# Patient Record
Sex: FEMALE | Race: WHITE | NOT HISPANIC OR LATINO | Employment: OTHER | URBAN - METROPOLITAN AREA
[De-identification: names, ages, dates, MRNs, and addresses within clinical notes are randomized per-mention and may not be internally consistent; named-entity substitution may affect disease eponyms.]

---

## 2023-06-06 LAB
ANION GAP IN SER/PLAS: 13 MMOL/L (ref 10–20)
APPEARANCE, URINE: ABNORMAL
BACTERIA, URINE: ABNORMAL /HPF
BILIRUBIN, URINE: NEGATIVE
BLOOD, URINE: NEGATIVE
CALCIUM (MG/DL) IN SER/PLAS: 9.8 MG/DL (ref 8.6–10.3)
CARBON DIOXIDE, TOTAL (MMOL/L) IN SER/PLAS: 25 MMOL/L (ref 21–32)
CHLORIDE (MMOL/L) IN SER/PLAS: 108 MMOL/L (ref 98–107)
COLOR, URINE: YELLOW
CREATININE (MG/DL) IN SER/PLAS: 1.7 MG/DL (ref 0.5–1.05)
GFR FEMALE: 30 ML/MIN/1.73M2
GLUCOSE (MG/DL) IN SER/PLAS: 151 MG/DL (ref 74–99)
GLUCOSE, URINE: NEGATIVE MG/DL
KETONES, URINE: NEGATIVE MG/DL
LEUKOCYTE ESTERASE, URINE: ABNORMAL
MUCUS, URINE: ABNORMAL /LPF
NITRITE, URINE: NEGATIVE
PH, URINE: 6 (ref 5–8)
POTASSIUM (MMOL/L) IN SER/PLAS: 4.1 MMOL/L (ref 3.5–5.3)
PROTEIN, URINE: NEGATIVE MG/DL
RBC, URINE: 3 /HPF (ref 0–5)
RENAL EPITHELIAL CELLS, URINE: <1 /HPF
SODIUM (MMOL/L) IN SER/PLAS: 142 MMOL/L (ref 136–145)
SPECIFIC GRAVITY, URINE: 1.01 (ref 1–1.03)
SQUAMOUS EPITHELIAL CELLS, URINE: 10 /HPF
UREA NITROGEN (MG/DL) IN SER/PLAS: 28 MG/DL (ref 6–23)
UROBILINOGEN, URINE: <2 MG/DL (ref 0–1.9)
WBC CLUMPS, URINE: ABNORMAL /HPF
WBC, URINE: 72 /HPF (ref 0–5)

## 2023-06-08 LAB — URINE CULTURE: ABNORMAL

## 2023-09-05 DIAGNOSIS — I10 HTN (HYPERTENSION), BENIGN: ICD-10-CM

## 2023-09-05 DIAGNOSIS — E03.9 ACQUIRED HYPOTHYROIDISM: Primary | ICD-10-CM

## 2023-09-05 DIAGNOSIS — E78.2 MIXED HYPERLIPIDEMIA: ICD-10-CM

## 2023-09-05 RX ORDER — LEVOTHYROXINE SODIUM 100 UG/1
100 TABLET ORAL DAILY
Qty: 90 TABLET | Refills: 3 | Status: SHIPPED | OUTPATIENT
Start: 2023-09-05 | End: 2024-09-04

## 2023-09-05 RX ORDER — CARVEDILOL 25 MG/1
1 TABLET ORAL 2 TIMES DAILY
COMMUNITY
Start: 2020-06-05 | End: 2023-09-05 | Stop reason: SDUPTHER

## 2023-09-05 RX ORDER — ROSUVASTATIN CALCIUM 20 MG/1
20 TABLET, COATED ORAL DAILY
Qty: 90 TABLET | Refills: 3 | Status: SHIPPED | OUTPATIENT
Start: 2023-09-05 | End: 2024-09-04

## 2023-09-05 RX ORDER — LEVOTHYROXINE SODIUM 100 UG/1
1 TABLET ORAL DAILY
COMMUNITY
End: 2023-09-05 | Stop reason: SDUPTHER

## 2023-09-05 RX ORDER — NIFEDIPINE 90 MG/1
90 TABLET, EXTENDED RELEASE ORAL DAILY
Qty: 90 TABLET | Refills: 3 | Status: SHIPPED | OUTPATIENT
Start: 2023-09-05 | End: 2024-09-04

## 2023-09-05 RX ORDER — SPIRONOLACTONE 25 MG/1
1 TABLET ORAL DAILY
COMMUNITY
Start: 2018-11-27 | End: 2024-03-15

## 2023-09-05 RX ORDER — ROSUVASTATIN CALCIUM 20 MG/1
1 TABLET, COATED ORAL DAILY
COMMUNITY
Start: 2009-07-01 | End: 2023-09-05 | Stop reason: SDUPTHER

## 2023-09-05 RX ORDER — CARVEDILOL 25 MG/1
25 TABLET ORAL 2 TIMES DAILY
Qty: 180 TABLET | Refills: 3 | Status: SHIPPED | OUTPATIENT
Start: 2023-09-05 | End: 2024-09-04

## 2023-09-05 RX ORDER — NIFEDIPINE 90 MG/1
1 TABLET, EXTENDED RELEASE ORAL DAILY
COMMUNITY
Start: 2018-08-14 | End: 2023-09-05 | Stop reason: SDUPTHER

## 2023-09-20 LAB
ANION GAP IN SER/PLAS: 12 MMOL/L (ref 10–20)
CALCIUM (MG/DL) IN SER/PLAS: 9.7 MG/DL (ref 8.6–10.3)
CARBON DIOXIDE, TOTAL (MMOL/L) IN SER/PLAS: 24 MMOL/L (ref 21–32)
CHLORIDE (MMOL/L) IN SER/PLAS: 109 MMOL/L (ref 98–107)
CREATININE (MG/DL) IN SER/PLAS: 1.51 MG/DL (ref 0.5–1.05)
GFR FEMALE: 35 ML/MIN/1.73M2
GLUCOSE (MG/DL) IN SER/PLAS: 142 MG/DL (ref 74–99)
MAGNESIUM (MG/DL) IN SER/PLAS: 2.08 MG/DL (ref 1.6–2.4)
PHOSPHATE (MG/DL) IN SER/PLAS: 3.2 MG/DL (ref 2.5–4.9)
POTASSIUM (MMOL/L) IN SER/PLAS: 4.3 MMOL/L (ref 3.5–5.3)
SODIUM (MMOL/L) IN SER/PLAS: 141 MMOL/L (ref 136–145)
URATE (MG/DL) IN SER/PLAS: 5.5 MG/DL (ref 2.3–6.7)
UREA NITROGEN (MG/DL) IN SER/PLAS: 21 MG/DL (ref 6–23)

## 2023-09-22 LAB — URINE CULTURE: ABNORMAL

## 2023-11-30 DIAGNOSIS — N18.4 CKD (CHRONIC KIDNEY DISEASE) STAGE 4, GFR 15-29 ML/MIN (MULTI): ICD-10-CM

## 2023-12-04 ENCOUNTER — LAB (OUTPATIENT)
Dept: LAB | Facility: LAB | Age: 78
End: 2023-12-04
Payer: MEDICARE

## 2023-12-04 DIAGNOSIS — N18.4 CKD (CHRONIC KIDNEY DISEASE) STAGE 4, GFR 15-29 ML/MIN (MULTI): ICD-10-CM

## 2023-12-04 LAB
ANION GAP SERPL CALC-SCNC: 11 MMOL/L (ref 10–20)
BUN SERPL-MCNC: 19 MG/DL (ref 6–23)
CALCIUM SERPL-MCNC: 9.6 MG/DL (ref 8.6–10.3)
CHLORIDE SERPL-SCNC: 107 MMOL/L (ref 98–107)
CO2 SERPL-SCNC: 27 MMOL/L (ref 21–32)
CREAT SERPL-MCNC: 1.58 MG/DL (ref 0.5–1.05)
GFR SERPL CREATININE-BSD FRML MDRD: 33 ML/MIN/1.73M*2
GLUCOSE SERPL-MCNC: 126 MG/DL (ref 74–99)
MAGNESIUM SERPL-MCNC: 2.11 MG/DL (ref 1.6–2.4)
PHOSPHATE SERPL-MCNC: 2.3 MG/DL (ref 2.5–4.9)
POTASSIUM SERPL-SCNC: 4.2 MMOL/L (ref 3.5–5.3)
SODIUM SERPL-SCNC: 141 MMOL/L (ref 136–145)
URATE SERPL-MCNC: 5.7 MG/DL (ref 2.3–6.7)

## 2023-12-04 PROCEDURE — 80048 BASIC METABOLIC PNL TOTAL CA: CPT

## 2023-12-04 PROCEDURE — 36415 COLL VENOUS BLD VENIPUNCTURE: CPT

## 2023-12-04 PROCEDURE — 83735 ASSAY OF MAGNESIUM: CPT

## 2023-12-04 PROCEDURE — 84550 ASSAY OF BLOOD/URIC ACID: CPT

## 2023-12-04 PROCEDURE — 84100 ASSAY OF PHOSPHORUS: CPT

## 2023-12-07 ENCOUNTER — OFFICE VISIT (OUTPATIENT)
Dept: NEPHROLOGY | Facility: CLINIC | Age: 78
End: 2023-12-07
Payer: MEDICARE

## 2023-12-07 VITALS
DIASTOLIC BLOOD PRESSURE: 58 MMHG | WEIGHT: 202.4 LBS | SYSTOLIC BLOOD PRESSURE: 120 MMHG | BODY MASS INDEX: 35.86 KG/M2 | HEART RATE: 68 BPM | HEIGHT: 63 IN

## 2023-12-07 DIAGNOSIS — N95.2 VAGINAL ATROPHY: Primary | ICD-10-CM

## 2023-12-07 DIAGNOSIS — E78.2 MIXED HYPERLIPIDEMIA: ICD-10-CM

## 2023-12-07 DIAGNOSIS — E55.9 VITAMIN D INSUFFICIENCY: ICD-10-CM

## 2023-12-07 DIAGNOSIS — N18.32 STAGE 3B CHRONIC KIDNEY DISEASE (MULTI): ICD-10-CM

## 2023-12-07 DIAGNOSIS — I10 PRIMARY HYPERTENSION: ICD-10-CM

## 2023-12-07 DIAGNOSIS — N18.4 CKD (CHRONIC KIDNEY DISEASE) STAGE 4, GFR 15-29 ML/MIN (MULTI): Primary | ICD-10-CM

## 2023-12-07 DIAGNOSIS — E87.20 METABOLIC ACIDOSIS: ICD-10-CM

## 2023-12-07 PROBLEM — G47.33 OSA ON CPAP: Status: ACTIVE | Noted: 2023-12-07

## 2023-12-07 PROBLEM — E78.00 HYPERCHOLESTEREMIA: Status: ACTIVE | Noted: 2023-12-07

## 2023-12-07 PROBLEM — N18.30 CHRONIC KIDNEY DISEASE (CKD), STAGE III (MODERATE) (MULTI): Status: ACTIVE | Noted: 2023-12-07

## 2023-12-07 PROCEDURE — 1160F RVW MEDS BY RX/DR IN RCRD: CPT | Performed by: CLINICAL NURSE SPECIALIST

## 2023-12-07 PROCEDURE — 1036F TOBACCO NON-USER: CPT | Performed by: CLINICAL NURSE SPECIALIST

## 2023-12-07 PROCEDURE — 3078F DIAST BP <80 MM HG: CPT | Performed by: CLINICAL NURSE SPECIALIST

## 2023-12-07 PROCEDURE — 1159F MED LIST DOCD IN RCRD: CPT | Performed by: CLINICAL NURSE SPECIALIST

## 2023-12-07 PROCEDURE — 3074F SYST BP LT 130 MM HG: CPT | Performed by: CLINICAL NURSE SPECIALIST

## 2023-12-07 PROCEDURE — 99213 OFFICE O/P EST LOW 20 MIN: CPT | Performed by: CLINICAL NURSE SPECIALIST

## 2023-12-07 RX ORDER — ESTRADIOL 0.1 MG/G
CREAM VAGINAL DAILY
COMMUNITY
End: 2023-12-07 | Stop reason: SDUPTHER

## 2023-12-07 RX ORDER — ESTRADIOL 0.1 MG/G
CREAM VAGINAL
Qty: 42.5 G | Refills: 2 | Status: SHIPPED | OUTPATIENT
Start: 2023-12-07

## 2023-12-07 RX ORDER — CILOSTAZOL 50 MG/1
50 TABLET ORAL 2 TIMES DAILY
COMMUNITY
End: 2024-02-05 | Stop reason: SDUPTHER

## 2023-12-07 RX ORDER — ASPIRIN 81 MG/1
81 TABLET ORAL DAILY
COMMUNITY

## 2023-12-07 ASSESSMENT — ENCOUNTER SYMPTOMS
CARDIOVASCULAR NEGATIVE: 1
MUSCULOSKELETAL NEGATIVE: 1
NEUROLOGICAL NEGATIVE: 1
GASTROINTESTINAL NEGATIVE: 1
PSYCHIATRIC NEGATIVE: 1
RESPIRATORY NEGATIVE: 1
ENDOCRINE NEGATIVE: 1
CONSTITUTIONAL NEGATIVE: 1

## 2023-12-07 NOTE — ASSESSMENT & PLAN NOTE
Blood pressures currently well-controlled on carvedilol, nifedipine, spironolactone, will continue with current meds

## 2023-12-07 NOTE — ASSESSMENT & PLAN NOTE
Currently on sodium bicarb tablets twice a day, bicarb level at 27 we will continue with current dosage

## 2023-12-07 NOTE — ASSESSMENT & PLAN NOTE
"Lab Results   Component Value Date    CHOL 159 12/12/2020     Lab Results   Component Value Date    HDL 45.0 12/12/2020     No results found for: \"LDLCALC\"  Lab Results   Component Value Date    TRIG 261 (H) 12/12/2020     No components found for: \"CHOLHDL\"  Currently on Crestor 20 mg, will recheck labs at her next visit  "

## 2023-12-07 NOTE — PROGRESS NOTES
Subjective   Patient ID: Brit Lopez is a 78 y.o. female who presents for Follow-up (6 month ck /Review labs).  Patient being seen in follow-up for chronic kidney disease stage IV with history of resistant hypertension    Labs reviewed  Uric acid 5.7  Phosphorus 2.3  Magnesium 2.11  Glucose 126  Electrolytes with sodium 141, potassium 4.2, chloride 107, bicarb 27, calcium 9.6  Renal functions BUN of 19 and creatinine of 1.58, GFR is 33    She is doing well  She does not have any complaints of urinary tract infections  She is complaining of some hip pain after she turned and bedbound  She does not have any edema  Her blood pressure is well-controlled  She is tolerating her sodium bicarb tablets without difficulties        Review of Systems   Constitutional: Negative.    Respiratory: Negative.     Cardiovascular: Negative.    Gastrointestinal: Negative.    Endocrine: Negative.    Genitourinary: Negative.    Musculoskeletal: Negative.    Skin: Negative.    Neurological: Negative.    Psychiatric/Behavioral: Negative.         Objective   Physical Exam  Vitals reviewed.   Constitutional:       Appearance: Normal appearance.   HENT:      Head: Normocephalic.   Cardiovascular:      Rate and Rhythm: Normal rate and regular rhythm.   Pulmonary:      Effort: Pulmonary effort is normal.      Breath sounds: Normal breath sounds.   Abdominal:      Palpations: Abdomen is soft.   Musculoskeletal:         General: Normal range of motion.   Skin:     General: Skin is warm and dry.   Neurological:      Mental Status: She is alert and oriented to person, place, and time.   Psychiatric:         Mood and Affect: Mood normal.         Behavior: Behavior normal.         Assessment/Plan       - Chronic kidney disease Stage IIIb/IV: With baseline creatinine 1.5-1.8  - Resistant hypertension: On Nifediine, Spironolactone, Coreg   - Normocytic anemia   - Obstructive sleep apnea with use of CPAP  - Coronary artery disease with 3 stents  placed in past  - Hypothyroidism TSH last of 0.13  - Hyperlipidemia  - Peripheral arterial disease  - Obesity  - Vaginal prolapse: Vaginal Hysterectomy, S/P repair 10/17/18  - Vitamin D Deficiency  - R Renal Atrophy with GFR of 18% and Nephrectomy on 2/1/2019  - Nephrolithiasis S/P ESWL  - Multiple UTI's in the past   - NAGMA : Better.   - Intermittent Diarrhea.        CELIA Kim-DANIEL, DNP 12/07/23 10:51 AM

## 2024-01-31 ENCOUNTER — APPOINTMENT (OUTPATIENT)
Dept: URBAN - METROPOLITAN AREA CLINIC 204 | Age: 79
Setting detail: DERMATOLOGY
End: 2024-02-01

## 2024-01-31 PROCEDURE — 17003 DESTRUCT PREMALG LES 2-14: CPT

## 2024-01-31 PROCEDURE — OTHER MIPS QUALITY: OTHER

## 2024-01-31 PROCEDURE — 17000 DESTRUCT PREMALG LESION: CPT

## 2024-01-31 PROCEDURE — 99213 OFFICE O/P EST LOW 20 MIN: CPT | Mod: 25

## 2024-01-31 NOTE — PROCEDURE: MIPS QUALITY
Quality 110: Preventive Care And Screening: Influenza Immunization: Influenza Immunization not Administered because Patient Refused.
Quality 47: Advance Care Plan: Advance Care Planning discussed and documented; advance care plan or surrogate decision maker documented in the medical record.
Quality 226: Preventive Care And Screening: Tobacco Use: Screening And Cessation Intervention: Patient screened for tobacco use and is an ex/non-smoker
Quality 111:Pneumonia Vaccination Status For Older Adults: Patient did not receive any pneumococcal conjugate or polysaccharide vaccine on or after their 60th birthday and before the end of the measurement period
Quality 130: Documentation Of Current Medications In The Medical Record: Current Medications Documented
Detail Level: Detailed
Additional Notes: Documentation for MIPS  purposes only. Full Patient visit note from paper chart is available for review and also scanned in EMA chart.

## 2024-02-01 ENCOUNTER — TELEPHONE (OUTPATIENT)
Dept: PRIMARY CARE | Facility: CLINIC | Age: 79
End: 2024-02-01
Payer: MEDICARE

## 2024-02-01 DIAGNOSIS — I21.9 ACUTE MYOCARDIAL INFARCTION, UNSPECIFIED MI TYPE, UNSPECIFIED ARTERY (MULTI): ICD-10-CM

## 2024-02-01 NOTE — TELEPHONE ENCOUNTER
Pt called for refill on cilostazol. Hasn't been seen since 2022. Patient needs an appointment for refills. Attempted to call but no answer and no voicemail. Please schedule for appointment when she calls.

## 2024-02-05 RX ORDER — CILOSTAZOL 50 MG/1
50 TABLET ORAL 2 TIMES DAILY
Qty: 180 TABLET | Refills: 0 | Status: SHIPPED | OUTPATIENT
Start: 2024-02-05 | End: 2024-02-26 | Stop reason: SDUPTHER

## 2024-02-26 ENCOUNTER — OFFICE VISIT (OUTPATIENT)
Dept: PRIMARY CARE | Facility: CLINIC | Age: 79
End: 2024-02-26
Payer: MEDICARE

## 2024-02-26 ENCOUNTER — LAB (OUTPATIENT)
Dept: LAB | Facility: LAB | Age: 79
End: 2024-02-26
Payer: MEDICARE

## 2024-02-26 VITALS
BODY MASS INDEX: 35.58 KG/M2 | DIASTOLIC BLOOD PRESSURE: 60 MMHG | WEIGHT: 200.8 LBS | HEART RATE: 66 BPM | SYSTOLIC BLOOD PRESSURE: 130 MMHG | HEIGHT: 63 IN | OXYGEN SATURATION: 96 %

## 2024-02-26 DIAGNOSIS — I73.9 PVD (PERIPHERAL VASCULAR DISEASE) (CMS-HCC): ICD-10-CM

## 2024-02-26 DIAGNOSIS — I21.9 ACUTE MYOCARDIAL INFARCTION, UNSPECIFIED MI TYPE, UNSPECIFIED ARTERY (MULTI): ICD-10-CM

## 2024-02-26 DIAGNOSIS — E66.01 OBESITY, MORBID (MULTI): ICD-10-CM

## 2024-02-26 DIAGNOSIS — I10 PRIMARY HYPERTENSION: ICD-10-CM

## 2024-02-26 DIAGNOSIS — R73.02 IGT (IMPAIRED GLUCOSE TOLERANCE): ICD-10-CM

## 2024-02-26 DIAGNOSIS — N18.32 STAGE 3B CHRONIC KIDNEY DISEASE (MULTI): ICD-10-CM

## 2024-02-26 DIAGNOSIS — E03.9 ACQUIRED HYPOTHYROIDISM: ICD-10-CM

## 2024-02-26 DIAGNOSIS — Z00.00 ENCOUNTER FOR MEDICARE ANNUAL WELLNESS EXAM: ICD-10-CM

## 2024-02-26 DIAGNOSIS — R06.09 DOE (DYSPNEA ON EXERTION): ICD-10-CM

## 2024-02-26 DIAGNOSIS — E78.2 MIXED HYPERLIPIDEMIA: Primary | ICD-10-CM

## 2024-02-26 PROBLEM — E78.00 HYPERCHOLESTEREMIA: Status: RESOLVED | Noted: 2023-12-07 | Resolved: 2024-02-26

## 2024-02-26 PROBLEM — N18.4 STAGE 4 CHRONIC KIDNEY DISEASE (MULTI): Status: RESOLVED | Noted: 2023-12-07 | Resolved: 2024-02-26

## 2024-02-26 LAB
EST. AVERAGE GLUCOSE BLD GHB EST-MCNC: 128 MG/DL
HBA1C MFR BLD: 6.1 %
TSH SERPL-ACNC: 0.39 MIU/L (ref 0.44–3.98)

## 2024-02-26 PROCEDURE — 36415 COLL VENOUS BLD VENIPUNCTURE: CPT

## 2024-02-26 PROCEDURE — 1159F MED LIST DOCD IN RCRD: CPT | Performed by: FAMILY MEDICINE

## 2024-02-26 PROCEDURE — 3078F DIAST BP <80 MM HG: CPT | Performed by: FAMILY MEDICINE

## 2024-02-26 PROCEDURE — G0439 PPPS, SUBSEQ VISIT: HCPCS | Performed by: FAMILY MEDICINE

## 2024-02-26 PROCEDURE — 1160F RVW MEDS BY RX/DR IN RCRD: CPT | Performed by: FAMILY MEDICINE

## 2024-02-26 PROCEDURE — 83036 HEMOGLOBIN GLYCOSYLATED A1C: CPT

## 2024-02-26 PROCEDURE — 1036F TOBACCO NON-USER: CPT | Performed by: FAMILY MEDICINE

## 2024-02-26 PROCEDURE — 3075F SYST BP GE 130 - 139MM HG: CPT | Performed by: FAMILY MEDICINE

## 2024-02-26 PROCEDURE — 1170F FXNL STATUS ASSESSED: CPT | Performed by: FAMILY MEDICINE

## 2024-02-26 PROCEDURE — 84443 ASSAY THYROID STIM HORMONE: CPT

## 2024-02-26 PROCEDURE — 99214 OFFICE O/P EST MOD 30 MIN: CPT | Performed by: FAMILY MEDICINE

## 2024-02-26 RX ORDER — ALPHA LIPOIC ACID 300 MG
CAPSULE ORAL
COMMUNITY

## 2024-02-26 RX ORDER — CILOSTAZOL 50 MG/1
50 TABLET ORAL 2 TIMES DAILY
Qty: 180 TABLET | Refills: 0 | Status: SHIPPED | OUTPATIENT
Start: 2024-02-26 | End: 2024-05-26

## 2024-02-26 RX ORDER — MULTIVITAMIN/IRON/FOLIC ACID 18MG-0.4MG
1 TABLET ORAL DAILY
COMMUNITY

## 2024-02-26 ASSESSMENT — PATIENT HEALTH QUESTIONNAIRE - PHQ9
2. FEELING DOWN, DEPRESSED OR HOPELESS: NOT AT ALL
1. LITTLE INTEREST OR PLEASURE IN DOING THINGS: NOT AT ALL
SUM OF ALL RESPONSES TO PHQ9 QUESTIONS 1 AND 2: 0

## 2024-02-26 ASSESSMENT — ACTIVITIES OF DAILY LIVING (ADL)
MANAGING_FINANCES: INDEPENDENT
DOING_HOUSEWORK: INDEPENDENT
GROCERY_SHOPPING: INDEPENDENT
TAKING_MEDICATION: INDEPENDENT
BATHING: INDEPENDENT
DRESSING: INDEPENDENT

## 2024-02-26 ASSESSMENT — ENCOUNTER SYMPTOMS
OCCASIONAL FEELINGS OF UNSTEADINESS: 0
DEPRESSION: 0
LOSS OF SENSATION IN FEET: 0

## 2024-02-26 NOTE — PROGRESS NOTES
"Subjective   Patient ID: Brit Lopez is a 78 y.o. female who presents for Medicare Annual Wellness Visit Subsequent.    HPI   Since the last office visit there have been no , hospitalizations, important illnesses or injuries.  Had bilt cataracts in last yr.  Had fall on stool incloset  Sees dyan Apodaca for brcancer, Cathie for otalgia  Has neuroathy since hip replacement  HTN-Takes and tolerates meds without side effects. No alcohol. no tobacco. no exercise. low salt.  Reviewed recommendation for 150 minutes of exercise per week including 2 days of weight training if over age 50  Hyperlipidemia- is on a statin and a prudent diet.  Hypothyroid- is euthyroid on replacement. Thyroid ros is unremarkable.  IGT- will ck  Taking otc for loose stools.  CAD- no angina, gets worn out faster over the last year.  Will check for significant coronary artery disease with stress test  PVD- claudication improved on pletal. Can walk grocery store.  She is compliant with KASSI treatment and receives benefit  Review of Systems  General-no fatigue weight to within 10 pounds  ENT no problems with vision swallowing  Cardiac no chest pains palpitations change in exercise tolerance or capacity  Pulmonary no cough shortness of breath  GI no heartburn or abdominal pain  Musculoskeletal no joint pains  Objective   /60   Pulse 66   Ht 1.6 m (5' 3\")   Wt 91.1 kg (200 lb 12.8 oz)   SpO2 96%   BMI 35.57 kg/m²     Physical Exam  General:  Alert, No acute distress. Appears stated age  Eye:  Pupils are equal, round and reactive to light, Extraocular movements are intact, Normal conjunctiva.    Neck:  Supple, Non-tender, No carotid bruit, No jugular venous distention, No lymphadenopathy, No thyromegaly.    Respiratory:  Lungs are clear to auscultation, Respirations are non-labored, Breath sounds are equal.    Cardiovascular:  Normal rate, Regular rhythm, No murmur.    Gastrointestinal:  Soft, Non-tender, No organomegaly. No " solid or pulsatile mass  Integumentary:  Warm, Dry. No concerning lesions on exposed areas  Neurologic:  Alert, Oriented.  Gross and fine motor intact, CN 2-12 intact  Psychiatric:  Cooperative, Appropriate mood & affect.  Assessment/Plan   Problem List Items Addressed This Visit             ICD-10-CM    Mixed hyperlipidemia - Primary E78.2    Relevant Orders    Follow Up In Primary Care - Established    Hypertension I10    Relevant Orders    Follow Up In Primary Care - Established    Chronic kidney disease (CKD), stage III (moderate) (CMS/[facility]) N18.30    Relevant Orders    Follow Up In Primary Care - Established    Acute myocardial infarction (CMS/[facility]) I21.9    Relevant Medications    cilostazol (Pletal) 50 mg tablet    Other Relevant Orders    Follow Up In Primary Care - Established    PVD (peripheral vascular disease) (CMS/[facility]) I73.9    Relevant Medications    cilostazol (Pletal) 50 mg tablet    Other Relevant Orders    Follow Up In Primary Care - Established     Other Visit Diagnoses         Codes    BINGHAM (dyspnea on exertion)     R06.09    Relevant Orders    Nuclear Stress Test    Follow Up In Primary Care - Established    Encounter for Medicare annual wellness exam     Z00.00    Relevant Orders    Follow Up In Primary Care - Established    Acquired hypothyroidism     E03.9    Relevant Orders    Thyroid Stimulating Hormone (Completed)    Follow Up In Primary Care - Established    IGT (impaired glucose tolerance)     R73.02    Relevant Orders    Hemoglobin A1C (Completed)    Follow Up In Primary Care - Established             Patient was identified as a fall risk. Risk prevention instructions provided.

## 2024-02-26 NOTE — PATIENT INSTRUCTIONS

## 2024-02-27 ENCOUNTER — TELEPHONE (OUTPATIENT)
Dept: PRIMARY CARE | Facility: CLINIC | Age: 79
End: 2024-02-27
Payer: MEDICARE

## 2024-02-27 NOTE — TELEPHONE ENCOUNTER
----- Message from Álvaro Sterling MD sent at 2/26/2024  4:40 PM EST -----  Please let patient know both the A1c and thyroid are acceptable

## 2024-03-14 ENCOUNTER — HOSPITAL ENCOUNTER (OUTPATIENT)
Dept: RADIOLOGY | Facility: HOSPITAL | Age: 79
Discharge: HOME | End: 2024-03-14
Payer: MEDICARE

## 2024-03-14 ENCOUNTER — HOSPITAL ENCOUNTER (OUTPATIENT)
Dept: CARDIOLOGY | Facility: HOSPITAL | Age: 79
Discharge: HOME | End: 2024-03-14
Payer: MEDICARE

## 2024-03-14 VITALS — SYSTOLIC BLOOD PRESSURE: 125 MMHG | DIASTOLIC BLOOD PRESSURE: 67 MMHG | HEART RATE: 73 BPM

## 2024-03-14 DIAGNOSIS — I10 PRIMARY HYPERTENSION: Primary | ICD-10-CM

## 2024-03-14 DIAGNOSIS — R06.09 DOE (DYSPNEA ON EXERTION): ICD-10-CM

## 2024-03-14 PROCEDURE — A9502 TC99M TETROFOSMIN: HCPCS | Performed by: FAMILY MEDICINE

## 2024-03-14 PROCEDURE — 93017 CV STRESS TEST TRACING ONLY: CPT

## 2024-03-14 PROCEDURE — 78452 HT MUSCLE IMAGE SPECT MULT: CPT | Performed by: NUCLEAR MEDICINE

## 2024-03-14 PROCEDURE — 3430000001 HC RX 343 DIAGNOSTIC RADIOPHARMACEUTICALS: Performed by: FAMILY MEDICINE

## 2024-03-14 PROCEDURE — 2500000004 HC RX 250 GENERAL PHARMACY W/ HCPCS (ALT 636 FOR OP/ED): Performed by: FAMILY MEDICINE

## 2024-03-14 PROCEDURE — 78452 HT MUSCLE IMAGE SPECT MULT: CPT

## 2024-03-14 RX ORDER — REGADENOSON 0.08 MG/ML
0.4 INJECTION, SOLUTION INTRAVENOUS ONCE
Status: COMPLETED | OUTPATIENT
Start: 2024-03-14 | End: 2024-03-14

## 2024-03-14 RX ADMIN — TETROFOSMIN 34 MILLICURIE: 0.23 INJECTION, POWDER, LYOPHILIZED, FOR SOLUTION INTRAVENOUS at 08:35

## 2024-03-14 RX ADMIN — TETROFOSMIN 10.8 MILLICURIE: 0.23 INJECTION, POWDER, LYOPHILIZED, FOR SOLUTION INTRAVENOUS at 07:00

## 2024-03-14 RX ADMIN — REGADENOSON 0.4 MG: 0.08 INJECTION, SOLUTION INTRAVENOUS at 08:33

## 2024-03-15 ENCOUNTER — TELEPHONE (OUTPATIENT)
Dept: PRIMARY CARE | Facility: CLINIC | Age: 79
End: 2024-03-15
Payer: MEDICARE

## 2024-03-15 RX ORDER — SPIRONOLACTONE 25 MG/1
25 TABLET ORAL DAILY
Qty: 90 TABLET | Refills: 3 | Status: SHIPPED | OUTPATIENT
Start: 2024-03-15

## 2024-03-15 NOTE — TELEPHONE ENCOUNTER
----- Message from Álvaro Sterling MD sent at 3/14/2024  6:20 PM EDT -----  Let patient know stress test was negative

## 2024-04-23 ENCOUNTER — OFFICE VISIT (OUTPATIENT)
Dept: OBSTETRICS AND GYNECOLOGY | Facility: CLINIC | Age: 79
End: 2024-04-23
Payer: MEDICARE

## 2024-04-23 VITALS
BODY MASS INDEX: 35.57 KG/M2 | RESPIRATION RATE: 16 BRPM | SYSTOLIC BLOOD PRESSURE: 126 MMHG | HEART RATE: 71 BPM | TEMPERATURE: 97.3 F | DIASTOLIC BLOOD PRESSURE: 69 MMHG | OXYGEN SATURATION: 95 % | WEIGHT: 200.8 LBS

## 2024-04-23 DIAGNOSIS — N39.0 RECURRENT UTI: ICD-10-CM

## 2024-04-23 DIAGNOSIS — R15.9 FULL INCONTINENCE OF FECES: Primary | ICD-10-CM

## 2024-04-23 LAB
APPEARANCE UR: ABNORMAL
BACTERIA #/AREA URNS AUTO: ABNORMAL /HPF
BILIRUB UR STRIP.AUTO-MCNC: NEGATIVE MG/DL
COLOR UR: ABNORMAL
GLUCOSE UR STRIP.AUTO-MCNC: NEGATIVE MG/DL
HOLD SPECIMEN: NORMAL
KETONES UR STRIP.AUTO-MCNC: NEGATIVE MG/DL
LEUKOCYTE ESTERASE UR QL STRIP.AUTO: ABNORMAL
MUCOUS THREADS #/AREA URNS AUTO: ABNORMAL /LPF
NITRITE UR QL STRIP.AUTO: NEGATIVE
PH UR STRIP.AUTO: 5 [PH]
PROT UR STRIP.AUTO-MCNC: NEGATIVE MG/DL
RBC # UR STRIP.AUTO: NEGATIVE /UL
RBC #/AREA URNS AUTO: ABNORMAL /HPF
SP GR UR STRIP.AUTO: 1.02
SQUAMOUS #/AREA URNS AUTO: ABNORMAL /HPF
UROBILINOGEN UR STRIP.AUTO-MCNC: <2 MG/DL
WBC #/AREA URNS AUTO: >50 /HPF
WBC CLUMPS #/AREA URNS AUTO: ABNORMAL /HPF

## 2024-04-23 PROCEDURE — 99214 OFFICE O/P EST MOD 30 MIN: CPT | Performed by: STUDENT IN AN ORGANIZED HEALTH CARE EDUCATION/TRAINING PROGRAM

## 2024-04-23 PROCEDURE — 1159F MED LIST DOCD IN RCRD: CPT | Performed by: STUDENT IN AN ORGANIZED HEALTH CARE EDUCATION/TRAINING PROGRAM

## 2024-04-23 PROCEDURE — 1160F RVW MEDS BY RX/DR IN RCRD: CPT | Performed by: STUDENT IN AN ORGANIZED HEALTH CARE EDUCATION/TRAINING PROGRAM

## 2024-04-23 PROCEDURE — 3074F SYST BP LT 130 MM HG: CPT | Performed by: STUDENT IN AN ORGANIZED HEALTH CARE EDUCATION/TRAINING PROGRAM

## 2024-04-23 PROCEDURE — 1036F TOBACCO NON-USER: CPT | Performed by: STUDENT IN AN ORGANIZED HEALTH CARE EDUCATION/TRAINING PROGRAM

## 2024-04-23 PROCEDURE — 1126F AMNT PAIN NOTED NONE PRSNT: CPT | Performed by: STUDENT IN AN ORGANIZED HEALTH CARE EDUCATION/TRAINING PROGRAM

## 2024-04-23 PROCEDURE — 81001 URINALYSIS AUTO W/SCOPE: CPT | Performed by: STUDENT IN AN ORGANIZED HEALTH CARE EDUCATION/TRAINING PROGRAM

## 2024-04-23 PROCEDURE — 87086 URINE CULTURE/COLONY COUNT: CPT | Mod: SAMLAB | Performed by: STUDENT IN AN ORGANIZED HEALTH CARE EDUCATION/TRAINING PROGRAM

## 2024-04-23 PROCEDURE — 3078F DIAST BP <80 MM HG: CPT | Performed by: STUDENT IN AN ORGANIZED HEALTH CARE EDUCATION/TRAINING PROGRAM

## 2024-04-23 ASSESSMENT — PAIN SCALES - GENERAL: PAINLEVEL: 0-NO PAIN

## 2024-04-23 NOTE — PROGRESS NOTES
HISTORY OF PRESENT ILLNESS:  Brit Lopez is a 78 y.o. female, who presents in follow up for FI, Clark.     During last encounter on 4/26/23, reviewed and agreed to the following:  Diagnoses:   #1 Asymptomatic bacteruria  #2 Fecal incontinence with loose stools     Plan:   1. Asymptomatic bacteruria, vaginal atrophy  - No recent hx of UTI.   - Continue using tv estrogen cream 2x per week for vaginal atrophy and UTI ppx.      2. Fecal incontinence, diarrhea  - Significantly improved FI since her last visit since she started eating small meals with taking sodium bicarb for her stage 4 CKD.   - Recommended increased fiber intake to bulk the stools making them easier to control thus preventing FI episodes and diarrhea, since she has not yet tried this  - Gave informational handout on different types of fiber supplementation.   - Counseled on keeping track of her diet to locate any dietary triggers of FI.   - Will consider SNM in the future if fecal incontinence worsens in degree of bother.      Follow up in 1 year with Dr. Margaret Hanley.     Today she reports   FI has been bothersome. Started taking Amberly supplement which has helped her. Tried fiber which made no difference.    Using vaginal estrogen. Some positive cultures with Dr. Cain but no symptoms.    The following were reviewed to gain additional history:  External notes: Dr. Sterling annual wellness note 2/26/24  Test results: urine cultures reviewed from last 12 months:  9/20/23: E coli > 100k, resistant to ampicillin, cipro, gent and intermediate to levo and tobramycin  6/6/23: E coli >100k resistant to ampicillin, cipro, gent, levo and tobra, intermediate to levo          PHYSICAL EXAMINATION:  No LMP recorded.  There is no height or weight on file to calculate BMI.  There were no vitals taken for this visit.    General Appearance: well appearing  Neuro: Alert and oriented   HEENT: mucous membranes moist, neck supple  Resp: No respiratory distress, normal  work of breathing  MSK: normal range of motion, gait appropriate    Pelvic: deferred    IMPRESSION AND PLAN:  Brit Lopez is a 78 y.o. who presents in follow up for FI, Clark    FI  - long discussion today regarding options  - discussed cannot know about safety of supplement she is taking given not regulated by FDA  - discussed other options: imodium (OTC) , cholestipol (prescription), SNM  - wants to continue supplement for now, will discus further at next visit if she wants to try any other treatment options    Clark  - continue using vaginal estrogen  - no episodes of symptomatic UTI in past year  - requested urine sample today and wants call regardless of results    All questions and concerns were answered and addressed.  The patient expressed understanding and agrees with the plan.     Margaret Hanley MD

## 2024-04-24 ENCOUNTER — APPOINTMENT (OUTPATIENT)
Dept: OBSTETRICS AND GYNECOLOGY | Facility: CLINIC | Age: 79
End: 2024-04-24
Payer: MEDICARE

## 2024-04-26 LAB
BACTERIA UR CULT: ABNORMAL
BACTERIA UR CULT: ABNORMAL

## 2024-05-13 ENCOUNTER — APPOINTMENT (OUTPATIENT)
Dept: SURGERY | Facility: CLINIC | Age: 79
End: 2024-05-13
Payer: MEDICARE

## 2024-05-14 ENCOUNTER — OFFICE VISIT (OUTPATIENT)
Dept: SURGERY | Facility: CLINIC | Age: 79
End: 2024-05-14
Payer: MEDICARE

## 2024-05-14 VITALS
BODY MASS INDEX: 35.17 KG/M2 | DIASTOLIC BLOOD PRESSURE: 78 MMHG | WEIGHT: 206 LBS | HEIGHT: 64 IN | HEART RATE: 73 BPM | SYSTOLIC BLOOD PRESSURE: 140 MMHG

## 2024-05-14 DIAGNOSIS — N63.13 UNSPECIFIED LUMP IN THE RIGHT BREAST, LOWER OUTER QUADRANT: ICD-10-CM

## 2024-05-14 DIAGNOSIS — R07.81 RIB PAIN ON LEFT SIDE: ICD-10-CM

## 2024-05-14 DIAGNOSIS — R22.2 CHEST WALL MASS: ICD-10-CM

## 2024-05-14 DIAGNOSIS — E87.20 ACIDOSIS: ICD-10-CM

## 2024-05-14 DIAGNOSIS — N63.10 MASS OF RIGHT BREAST, UNSPECIFIED QUADRANT: Primary | ICD-10-CM

## 2024-05-14 DIAGNOSIS — Z12.31 ENCOUNTER FOR SCREENING MAMMOGRAM FOR MALIGNANT NEOPLASM OF BREAST: Primary | ICD-10-CM

## 2024-05-14 PROCEDURE — 3078F DIAST BP <80 MM HG: CPT | Performed by: SURGERY

## 2024-05-14 PROCEDURE — 1036F TOBACCO NON-USER: CPT | Performed by: SURGERY

## 2024-05-14 PROCEDURE — 1160F RVW MEDS BY RX/DR IN RCRD: CPT | Performed by: SURGERY

## 2024-05-14 PROCEDURE — 1159F MED LIST DOCD IN RCRD: CPT | Performed by: SURGERY

## 2024-05-14 PROCEDURE — 3077F SYST BP >= 140 MM HG: CPT | Performed by: SURGERY

## 2024-05-14 PROCEDURE — 99213 OFFICE O/P EST LOW 20 MIN: CPT | Performed by: SURGERY

## 2024-05-14 RX ORDER — SODIUM BICARBONATE 650 MG/1
1300 TABLET ORAL 2 TIMES DAILY
Qty: 360 TABLET | Refills: 3 | Status: SHIPPED | OUTPATIENT
Start: 2024-05-14

## 2024-05-14 NOTE — PROGRESS NOTES
General Surgery Consultation    Patient: Brit Lopez  : 1945  MRN: [id number]  Date of Consultation: 24    Primary Care Provider: Álvaro Sterling MD  Referring Provider: No ref. provider found    Chief Complaint: Patient has a history of breast cancer and returns for breast exam after mammograms in August.    History of Present Illness: Brit Lopez is a 78 y.o. old female seen for breast exam.  She states she has been feeling a mass laterally at the inframammary fold.  She has dense dystrophic calcifications in this breast ever since she underwent partial mastectomy and radiation.  Her nipple areolar complex is gone.  She also states she fell a number of months ago under her left side and is still having tenderness along the lateral portion of her breast with motion and palpation.  She continues with the incontinence although it has improved with one of the medication she is taking and she asked that they are all okay with her kidneys.  I have recommended she discuss this further with Dr. Cain.  Patient states she only has 1 kidney    Medical History:  Past Medical History:   Diagnosis Date    Old myocardial infarction 2018    History of myocardial infarction    Personal history of malignant neoplasm of breast     History of malignant neoplasm of breast    Personal history of other diseases of the circulatory system 2018    History of hypertension    Personal history of other diseases of the female genital tract 2018    History of uterine prolapse    Personal history of other endocrine, nutritional and metabolic disease 2018    History of hyperlipidemia    Personal history of other medical treatment     H/O mammogram       Surgical History:  Past Surgical History:   Procedure Laterality Date    APPENDECTOMY  2018    Appendectomy    BI MAMMO BILATERAL SCREENING  2023    cat 2    CATARACT EXTRACTION Bilateral 2024    COLONOSCOPY  2018      RAMÓN    INCISIONAL BREAST BIOPSY  04/06/2018    Incisional Breast Biopsy    OTHER SURGICAL HISTORY  04/06/2018    Breast Surgery Revision Of Reconstructed Right Breast    OTHER SURGICAL HISTORY  09/13/2021    Hip replacement    OTHER SURGICAL HISTORY  12/13/2019    Tubal ligation    OTHER SURGICAL HISTORY  12/13/2019    Kidney surgery    OTHER SURGICAL HISTORY  12/13/2019    Hysterectomy    OTHER SURGICAL HISTORY  12/13/2019    Cardiac catheterization with stent placement    OTHER SURGICAL HISTORY  12/13/2019    Cardiac catheterization    OTHER SURGICAL HISTORY  12/13/2019    Myringotomy with tube placement    OTHER SURGICAL HISTORY  2009    Right mastectomy       Home Medications:  Prior to Admission medications    Medication Sig Start Date End Date Taking? Authorizing Provider   alpha lipoic acid 300 mg capsule Take by mouth.   Yes Historical Provider, MD   aspirin 81 mg EC tablet Take 1 tablet (81 mg) by mouth once daily.   Yes Historical Provider, MD   b complex 0.4 mg tablet Take 1 tablet by mouth once daily.   Yes Historical Provider, MD   carvedilol (Coreg) 25 mg tablet Take 1 tablet (25 mg) by mouth 2 times a day. 9/5/23 9/4/24 Yes Álvaro Sterling MD   cilostazol (Pletal) 50 mg tablet Take 1 tablet (50 mg) by mouth 2 times a day. 2/26/24 5/26/24 Yes Álvaro Sterling MD   estradiol (Estrace) 0.01 % (0.1 mg/gram) vaginal cream Insert a pea sized amount into the vagina twice weekly 12/7/23  Yes Margaret Hanley MD   levothyroxine (Synthroid, Levoxyl) 100 mcg tablet Take 1 tablet (100 mcg) by mouth once daily. 9/5/23 9/4/24 Yes Álvaro Sterling MD   NIFEdipine XL 90 mg 24 hr tablet Take 1 tablet (90 mg) by mouth once daily. 9/5/23 9/4/24 Yes Álvaro Sterling MD   rosuvastatin (Crestor) 20 mg tablet Take 1 tablet (20 mg) by mouth once daily. 9/5/23 9/4/24 Yes Álvaro Sterling MD   spironolactone (Aldactone) 25 mg tablet TAKE 1 TABLET BY MOUTH DAILY 3/15/24  Yes Vincent Cain, DO   sodium  "bicarbonate 650 mg tablet TAKE 2 TABLETS BY MOUTH TWICE DAILY 5/14/24   Tanja Cain, APRN-CNP, DNP       Allergies:  Allergies   Allergen Reactions    Amoxicillin-Pot Clavulanate Nausea/vomiting     vomiting    Citalopram Unknown    Hydralazine GI Upset and Unknown    Levofloxacin Cardiac arrhythmia/arrest    Nitrofurantoin Monohyd/M-Cryst Hives and Itching    Sulfa (Sulfonamide Antibiotics) Unknown    Thiazides Hives and Rash       Family History:   Family History   Problem Relation Name Age of Onset    Hypertension Mother      Heart attack Mother      Heart disease Mother      Skin cancer Father      Heart attack Father      Kidney disease Father      Heart disease Father      Asthma Sister      Hypertension Daughter         Social History:  Patient does not smoke or use drugs.  She does drink occasionally.      ROS:  Constitutional:  no fever, sweats, and chills  Cardiovascular: No chest pain  Respiratory: No cough or shortness of breath  Gastrointestinal: Continues with incontinence after the birth injury despite reconstruction.  She is seeing urogyn and there is some improvement but she is not ready to commit to the procedures yet.  Genitourinary: no dysuria  Musculoskeletal: no weakness or swelling  Integumentary: no rashes  Neurological: no confusion  Endocrine: no heat or cold intolerance  Heme/Lymph: no easy bruising or bleeding    Objective:  /78   Pulse 73   Ht 1.613 m (5' 3.5\")   Wt 93.4 kg (206 lb)   BMI 35.92 kg/m²     Physical Exam:  Constitutional: No acute distress, conversant, pleasant  Neurologic: alert and oriented  Psych: appropriate affect  Ears, Nose, Mouth and Throat: mucus membranes moist  Pulmonary: No labored breathing  Cardiovascular: Regular rate and rhythm  Abdomen: soft, non-distended, non-tender  Musculoskeletal: Moves all extremities, no edema  Skin: warm and dry  Chest exam in the right breast you can feel the dystrophic calcifications and at the lateral edge of the " inframammary ridge I think it is another dystrophic calcification as it is quite firm.  At the lateral edge of her left breast in the infra axillary portion she is tender near the axillary tail.    Imaging:  Narrative   Interpreted By:  KWAME DOUGLAS MD  MRN: [id number]  Patient Name: BRIT LOPEZ     STUDY:  Digital mammography screening with stanley; 8/30/2023 2:35 pm     ACCESSION NUMBER(S):  [id number]     ORDERING CLINICIAN:  JAYDEN MELGAR     INDICATION:  Screening. History of right-sided breast cancer, status post  lumpectomy.     COMPARISON:  Comparison is made to prior digital mammograms dated 08/29/2022     FINDINGS:  CC and MLO 2D digital mammograms and digital breast tomosynthesis  images were obtained of the  bilateral breasts. 3-D volume images  were reconstructed in 4 views at an independent workstation as 1 mm  slices through the  breasts in both the CC and MLO projections.     There are areas of scattered fibroglandular tissue.  Architectural  distortion and extensive dense dystrophic calcifications are seen in  the central aspect of the right breast, similar to prior studies. No  new or enlarging mass or focal asymmetry is identified.  No  suspicious microcalcifications or foci of architectural distortion  are seen.  There has been no significant change.     This study was interpreted with CAD.      Impression   No mammographic evidence of malignancy.     BI-RADS CATEGORY:     BI-RADS Category:  2 Benign.  Recommendation:  Routine Screening Mammogram in 1 Year.  Recommended Date:  1 Year.  Laterality:  Bilateral.     Result History    BI mammo bilateral screening tomosynthesis (Order #[id number]) on 8    Assessment and Plan: Brit Lopez is a 78 y.o. old female with some chest wall discomfort as well as for follow-up exam with history of breast cancer.  We have ordered rib films to evaluate on the left and will proceed from there.  The right inframammary mass we have ordered an ultrasound and this  probably represents a dystrophic calcification.  I have ordered her bilateral mammograms for August.  She will check with Dr. Cain on the supplements she is taking and I have encouraged her to look into the procedures involved as the incontinence seems to be affecting her lifestyle.  She states that is actually improved and she does not wish to pursue it at this time.     Rhina Chandra MD  5/14/2024

## 2024-05-16 ENCOUNTER — APPOINTMENT (OUTPATIENT)
Dept: RADIOLOGY | Facility: HOSPITAL | Age: 79
End: 2024-05-16
Payer: MEDICARE

## 2024-05-16 ENCOUNTER — HOSPITAL ENCOUNTER (OUTPATIENT)
Dept: RADIOLOGY | Facility: HOSPITAL | Age: 79
Discharge: HOME | End: 2024-05-16
Payer: MEDICARE

## 2024-05-16 DIAGNOSIS — N63.10 MASS OF RIGHT BREAST, UNSPECIFIED QUADRANT: ICD-10-CM

## 2024-05-16 DIAGNOSIS — N63.13 UNSPECIFIED LUMP IN THE RIGHT BREAST, LOWER OUTER QUADRANT: ICD-10-CM

## 2024-05-16 DIAGNOSIS — R07.81 RIB PAIN ON LEFT SIDE: ICD-10-CM

## 2024-05-16 PROCEDURE — G0279 TOMOSYNTHESIS, MAMMO: HCPCS | Mod: RIGHT SIDE | Performed by: RADIOLOGY

## 2024-05-16 PROCEDURE — 76642 ULTRASOUND BREAST LIMITED: CPT | Mod: RIGHT SIDE | Performed by: RADIOLOGY

## 2024-05-16 PROCEDURE — 77061 BREAST TOMOSYNTHESIS UNI: CPT | Mod: RT

## 2024-05-16 PROCEDURE — 71101 X-RAY EXAM UNILAT RIBS/CHEST: CPT | Mod: LEFT SIDE | Performed by: RADIOLOGY

## 2024-05-16 PROCEDURE — 77065 DX MAMMO INCL CAD UNI: CPT | Mod: RIGHT SIDE | Performed by: RADIOLOGY

## 2024-05-16 PROCEDURE — 76642 ULTRASOUND BREAST LIMITED: CPT | Mod: RT

## 2024-05-16 PROCEDURE — 71101 X-RAY EXAM UNILAT RIBS/CHEST: CPT | Mod: LT

## 2024-05-28 ENCOUNTER — TELEPHONE (OUTPATIENT)
Dept: PRIMARY CARE | Facility: CLINIC | Age: 79
End: 2024-05-28

## 2024-05-28 DIAGNOSIS — I73.9 PVD (PERIPHERAL VASCULAR DISEASE) (CMS-HCC): Primary | ICD-10-CM

## 2024-05-28 NOTE — TELEPHONE ENCOUNTER
PT IS HERE WITH HER  JOHN FOR HIS APPT, SHE IS REQ HANDICAP [facility] IS GOING OUT OF STATE CAN'T WALK FAR DISTANCES. WOULD LIKE TO HAVE IT IF POSSIBLE BY THE TIME HER 'S APPT IS FINISHED. IS LEAVING JUNE 7TH FOR TRIP. PLEASE ADVISE. THANK YOU.

## 2024-06-03 ENCOUNTER — LAB (OUTPATIENT)
Dept: LAB | Facility: LAB | Age: 79
End: 2024-06-03
Payer: MEDICARE

## 2024-06-03 DIAGNOSIS — N18.4 CKD (CHRONIC KIDNEY DISEASE) STAGE 4, GFR 15-29 ML/MIN (MULTI): ICD-10-CM

## 2024-06-03 DIAGNOSIS — E78.2 MIXED HYPERLIPIDEMIA: ICD-10-CM

## 2024-06-03 LAB
ALBUMIN SERPL BCP-MCNC: 4.4 G/DL (ref 3.4–5)
ALP SERPL-CCNC: 62 U/L (ref 33–136)
ALT SERPL W P-5'-P-CCNC: 23 U/L (ref 7–45)
ANION GAP SERPL CALC-SCNC: 12 MMOL/L (ref 10–20)
APPEARANCE UR: ABNORMAL
AST SERPL W P-5'-P-CCNC: 26 U/L (ref 9–39)
BACTERIA #/AREA URNS AUTO: ABNORMAL /HPF
BILIRUB SERPL-MCNC: 1 MG/DL (ref 0–1.2)
BILIRUB UR STRIP.AUTO-MCNC: NEGATIVE MG/DL
BUN SERPL-MCNC: 25 MG/DL (ref 6–23)
CALCIUM SERPL-MCNC: 9.9 MG/DL (ref 8.6–10.3)
CHLORIDE SERPL-SCNC: 111 MMOL/L (ref 98–107)
CHOLEST SERPL-MCNC: 171 MG/DL (ref 0–199)
CHOLESTEROL/HDL RATIO: 3.8
CO2 SERPL-SCNC: 24 MMOL/L (ref 21–32)
COLOR UR: YELLOW
CREAT SERPL-MCNC: 1.5 MG/DL (ref 0.5–1.05)
CREAT UR-MCNC: 107.6 MG/DL (ref 20–320)
EGFRCR SERPLBLD CKD-EPI 2021: 35 ML/MIN/1.73M*2
ERYTHROCYTE [DISTWIDTH] IN BLOOD BY AUTOMATED COUNT: 12.8 % (ref 11.5–14.5)
GLUCOSE SERPL-MCNC: 118 MG/DL (ref 74–99)
GLUCOSE UR STRIP.AUTO-MCNC: NEGATIVE MG/DL
HCT VFR BLD AUTO: 45.6 % (ref 36–46)
HDLC SERPL-MCNC: 45 MG/DL
HGB BLD-MCNC: 15 G/DL (ref 12–16)
KETONES UR STRIP.AUTO-MCNC: NEGATIVE MG/DL
LDLC SERPL CALC-MCNC: 85 MG/DL
LEUKOCYTE ESTERASE UR QL STRIP.AUTO: ABNORMAL
MCH RBC QN AUTO: 31.2 PG (ref 26–34)
MCHC RBC AUTO-ENTMCNC: 32.9 G/DL (ref 32–36)
MCV RBC AUTO: 95 FL (ref 80–100)
MICROALBUMIN UR-MCNC: 154.5 MG/L
MICROALBUMIN/CREAT UR: 143.6 UG/MG CREAT
MUCOUS THREADS #/AREA URNS AUTO: ABNORMAL /LPF
NITRITE UR QL STRIP.AUTO: POSITIVE
NON HDL CHOLESTEROL: 126 MG/DL (ref 0–149)
NRBC BLD-RTO: 0 /100 WBCS (ref 0–0)
PH UR STRIP.AUTO: 6 [PH]
PLATELET # BLD AUTO: 250 X10*3/UL (ref 150–450)
POTASSIUM SERPL-SCNC: 4.5 MMOL/L (ref 3.5–5.3)
PROT SERPL-MCNC: 6.5 G/DL (ref 6.4–8.2)
PROT UR STRIP.AUTO-MCNC: ABNORMAL MG/DL
RBC # BLD AUTO: 4.81 X10*6/UL (ref 4–5.2)
RBC # UR STRIP.AUTO: NEGATIVE /UL
RBC #/AREA URNS AUTO: ABNORMAL /HPF
SODIUM SERPL-SCNC: 142 MMOL/L (ref 136–145)
SP GR UR STRIP.AUTO: 1.01
SQUAMOUS #/AREA URNS AUTO: ABNORMAL /HPF
TRIGL SERPL-MCNC: 205 MG/DL (ref 0–149)
UROBILINOGEN UR STRIP.AUTO-MCNC: <2 MG/DL
VLDL: 41 MG/DL (ref 0–40)
WBC # BLD AUTO: 6.2 X10*3/UL (ref 4.4–11.3)
WBC #/AREA URNS AUTO: >50 /HPF

## 2024-06-03 PROCEDURE — 80061 LIPID PANEL: CPT

## 2024-06-03 PROCEDURE — 82570 ASSAY OF URINE CREATININE: CPT

## 2024-06-03 PROCEDURE — 80053 COMPREHEN METABOLIC PANEL: CPT

## 2024-06-03 PROCEDURE — 36415 COLL VENOUS BLD VENIPUNCTURE: CPT

## 2024-06-03 PROCEDURE — 81001 URINALYSIS AUTO W/SCOPE: CPT

## 2024-06-03 PROCEDURE — 82043 UR ALBUMIN QUANTITATIVE: CPT

## 2024-06-03 PROCEDURE — 85027 COMPLETE CBC AUTOMATED: CPT

## 2024-06-06 ENCOUNTER — OFFICE VISIT (OUTPATIENT)
Dept: NEPHROLOGY | Facility: CLINIC | Age: 79
End: 2024-06-06
Payer: MEDICARE

## 2024-06-06 VITALS
BODY MASS INDEX: 34.52 KG/M2 | DIASTOLIC BLOOD PRESSURE: 72 MMHG | HEIGHT: 64 IN | HEART RATE: 75 BPM | SYSTOLIC BLOOD PRESSURE: 142 MMHG | WEIGHT: 202.2 LBS

## 2024-06-06 DIAGNOSIS — E87.20 METABOLIC ACIDOSIS: ICD-10-CM

## 2024-06-06 DIAGNOSIS — E78.2 MIXED HYPERLIPIDEMIA: ICD-10-CM

## 2024-06-06 DIAGNOSIS — N18.32 STAGE 3B CHRONIC KIDNEY DISEASE (MULTI): Primary | ICD-10-CM

## 2024-06-06 DIAGNOSIS — I10 PRIMARY HYPERTENSION: ICD-10-CM

## 2024-06-06 DIAGNOSIS — E55.9 VITAMIN D INSUFFICIENCY: ICD-10-CM

## 2024-06-06 PROCEDURE — 3078F DIAST BP <80 MM HG: CPT | Performed by: CLINICAL NURSE SPECIALIST

## 2024-06-06 PROCEDURE — 1160F RVW MEDS BY RX/DR IN RCRD: CPT | Performed by: CLINICAL NURSE SPECIALIST

## 2024-06-06 PROCEDURE — 1036F TOBACCO NON-USER: CPT | Performed by: CLINICAL NURSE SPECIALIST

## 2024-06-06 PROCEDURE — 3077F SYST BP >= 140 MM HG: CPT | Performed by: CLINICAL NURSE SPECIALIST

## 2024-06-06 PROCEDURE — 99213 OFFICE O/P EST LOW 20 MIN: CPT | Performed by: CLINICAL NURSE SPECIALIST

## 2024-06-06 PROCEDURE — 1159F MED LIST DOCD IN RCRD: CPT | Performed by: CLINICAL NURSE SPECIALIST

## 2024-06-06 ASSESSMENT — ENCOUNTER SYMPTOMS
MUSCULOSKELETAL NEGATIVE: 1
GASTROINTESTINAL NEGATIVE: 1
ENDOCRINE NEGATIVE: 1
PSYCHIATRIC NEGATIVE: 1
RESPIRATORY NEGATIVE: 1
CONSTITUTIONAL NEGATIVE: 1
NEUROLOGICAL NEGATIVE: 1
CARDIOVASCULAR NEGATIVE: 1

## 2024-06-06 NOTE — PROGRESS NOTES
"Subjective   Patient ID: Brit Lopez \"Robret" is a 78 y.o. female who presents for Follow-up (6 month ck/Review labs 6/3).  Patient being seen in follow-up for chronic kidney disease stage IIIb/IV with of hypertension and multiple UTIs    Labs reviewed  Microscopic urine positive for WBCs greater than 50 per high-powered field and 2+ bacteria  Urinalysis with 1+ protein  Albumin creatinine ratio 143.6  H&H 15.0 and 45.6  Total cholesterol 171, HDL 45, LDL 85, triglycerides 205  Glucose 118  Sodium 142, potassium 4.5, chloride 111, bicarb 24  Renal function with BUN of 25 and creatinine of 1.50    She is doing well  She is taking some supplements to help with her diarrhea which has had significant improvement  She is also feeling that she is getting better control of her UTIs that she is trying to use a wash rag after she urinates  She occasionally has some dependent lower extremity edema left greater than right  She has no complaints of shortness of breath          Review of Systems   Constitutional: Negative.    Respiratory: Negative.     Cardiovascular: Negative.    Gastrointestinal: Negative.    Endocrine: Negative.    Genitourinary: Negative.    Musculoskeletal: Negative.    Skin: Negative.    Neurological: Negative.    Psychiatric/Behavioral: Negative.         Objective   Physical Exam  Vitals reviewed.   Constitutional:       Appearance: Normal appearance.   HENT:      Head: Normocephalic.   Cardiovascular:      Rate and Rhythm: Normal rate and regular rhythm.   Pulmonary:      Effort: Pulmonary effort is normal.      Breath sounds: Normal breath sounds.   Abdominal:      Palpations: Abdomen is soft.   Musculoskeletal:         General: Normal range of motion.      Right lower leg: Edema (Trace) present.      Left lower leg: Edema (Trace) present.   Skin:     General: Skin is warm and dry.   Neurological:      Mental Status: She is alert and oriented to person, place, and time.   Psychiatric:         Mood " Received notification on 2024 at 8:26 AM of patient's death from Epic automated notification, see triage note to alternate provider.   Place of death was reported as [facility].  Graft status at the time of death was reported as Functioning.    TIS verification is: Pending  The Transplant Office has been notified that patient is . The Post Mortem Encounter has been completed. Notifications have been sent to the Care team.   Instructions have been sent to send a sympathy card to the family.    TAMMY BULL  Received notification of patient's death from EPIC Report.  Place of death was reported as [facility].  Graft status at the time of death was reported as Functioning  TIS verification is: Complete     and Affect: Mood normal.         Behavior: Behavior normal.         Assessment/Plan   Problem List Items Addressed This Visit             ICD-10-CM    Mixed hyperlipidemia E78.2     On rosuvastatin 20 mg daily         Hypertension I10     Blood pressure is currently well-controlled on nifedipine, will continue with current medication         Metabolic acidosis E87.20     Bicarb level stable at 24         Vitamin D insufficiency E55.9     Will continue with current supplement, will repeat lab work in 6 months and check vitamin D level at that time         Chronic kidney disease (CKD), stage III (moderate) (Multi) - Primary N18.30     Creatinine stable at 1.50, blood pressure is well-controlled, not diabetic, not on SGLT2 secondary to frequent UTIs, not using nephrotoxic medications         Relevant Orders    Basic metabolic panel    Urinalysis with Reflex Microscopic    Albumin , Urine Random    Follow Up In Nephrology     - Chronic kidney disease Stage IIIb/IV: With baseline creatinine 1.5-1.8  - Resistant hypertension: On Nifediine, Spironolactone, Coreg   - Normocytic anemia   - Obstructive sleep apnea with use of CPAP  - Coronary artery disease with 3 stents placed in past  - Hypothyroidism TSH last of 0.13  - Hyperlipidemia  - Peripheral arterial disease  - Obesity  - Vaginal prolapse: Vaginal Hysterectomy, S/P repair 10/17/18  - Vitamin D Deficiency  - R Renal Atrophy with GFR of 18% and Nephrectomy on 2/1/2019  - Nephrolithiasis S/P ESWL  - Multiple UTI's in the past   - NAGMA : Better.   - Intermittent Diarrhea.           CELIA Kim-DANIEL, DNP 06/06/24 11:30 AM

## 2024-06-06 NOTE — ASSESSMENT & PLAN NOTE
Will continue with current supplement, will repeat lab work in 6 months and check vitamin D level at that time

## 2024-06-06 NOTE — ASSESSMENT & PLAN NOTE
Creatinine stable at 1.50, blood pressure is well-controlled, not diabetic, not on SGLT2 secondary to frequent UTIs, not using nephrotoxic medications

## 2024-06-19 ENCOUNTER — APPOINTMENT (OUTPATIENT)
Dept: RADIOLOGY | Facility: HOSPITAL | Age: 79
DRG: 193 | End: 2024-06-19
Payer: MEDICARE

## 2024-06-19 ENCOUNTER — APPOINTMENT (OUTPATIENT)
Dept: CARDIOLOGY | Facility: HOSPITAL | Age: 79
DRG: 193 | End: 2024-06-19
Payer: MEDICARE

## 2024-06-19 ENCOUNTER — HOSPITAL ENCOUNTER (INPATIENT)
Facility: HOSPITAL | Age: 79
LOS: 4 days | Discharge: HOME | End: 2024-06-23
Attending: EMERGENCY MEDICINE | Admitting: NURSE PRACTITIONER
Payer: MEDICARE

## 2024-06-19 DIAGNOSIS — J18.9 PNEUMONIA OF BOTH LOWER LOBES DUE TO INFECTIOUS ORGANISM: ICD-10-CM

## 2024-06-19 DIAGNOSIS — E66.01 OBESITY, MORBID (MULTI): ICD-10-CM

## 2024-06-19 DIAGNOSIS — I50.20 UNSPECIFIED SYSTOLIC (CONGESTIVE) HEART FAILURE (MULTI): ICD-10-CM

## 2024-06-19 DIAGNOSIS — J96.01 ACUTE HYPOXEMIC RESPIRATORY FAILURE (MULTI): Primary | ICD-10-CM

## 2024-06-19 DIAGNOSIS — I50.43 CHF (CONGESTIVE HEART FAILURE), NYHA CLASS I, ACUTE ON CHRONIC, COMBINED (MULTI): ICD-10-CM

## 2024-06-19 DIAGNOSIS — G47.33 OSA ON CPAP: ICD-10-CM

## 2024-06-19 LAB
ALBUMIN SERPL BCP-MCNC: 4 G/DL (ref 3.4–5)
ALP SERPL-CCNC: 63 U/L (ref 33–136)
ALT SERPL W P-5'-P-CCNC: 18 U/L (ref 7–45)
ANION GAP SERPL CALC-SCNC: 12 MMOL/L (ref 10–20)
AST SERPL W P-5'-P-CCNC: 22 U/L (ref 9–39)
BASOPHILS # BLD AUTO: 0.07 X10*3/UL (ref 0–0.1)
BASOPHILS NFR BLD AUTO: 0.7 %
BILIRUB SERPL-MCNC: 1.1 MG/DL (ref 0–1.2)
BNP SERPL-MCNC: 368 PG/ML (ref 0–99)
BUN SERPL-MCNC: 25 MG/DL (ref 6–23)
CALCIUM SERPL-MCNC: 9 MG/DL (ref 8.6–10.3)
CARDIAC TROPONIN I PNL SERPL HS: 11 NG/L (ref 0–13)
CARDIAC TROPONIN I PNL SERPL HS: 11 NG/L (ref 0–13)
CHLORIDE SERPL-SCNC: 108 MMOL/L (ref 98–107)
CO2 SERPL-SCNC: 20 MMOL/L (ref 21–32)
CREAT SERPL-MCNC: 1.53 MG/DL (ref 0.5–1.05)
D DIMER PPP FEU-MCNC: 1328 NG/ML FEU
EGFRCR SERPLBLD CKD-EPI 2021: 34 ML/MIN/1.73M*2
EOSINOPHIL # BLD AUTO: 0.21 X10*3/UL (ref 0–0.4)
EOSINOPHIL NFR BLD AUTO: 2.1 %
ERYTHROCYTE [DISTWIDTH] IN BLOOD BY AUTOMATED COUNT: 12.8 % (ref 11.5–14.5)
FLUAV RNA RESP QL NAA+PROBE: NOT DETECTED
FLUBV RNA RESP QL NAA+PROBE: NOT DETECTED
GLUCOSE SERPL-MCNC: 161 MG/DL (ref 74–99)
HCT VFR BLD AUTO: 38.6 % (ref 36–46)
HGB BLD-MCNC: 12.9 G/DL (ref 12–16)
IMM GRANULOCYTES # BLD AUTO: 0.04 X10*3/UL (ref 0–0.5)
IMM GRANULOCYTES NFR BLD AUTO: 0.4 % (ref 0–0.9)
LYMPHOCYTES # BLD AUTO: 2.08 X10*3/UL (ref 0.8–3)
LYMPHOCYTES NFR BLD AUTO: 20.7 %
MCH RBC QN AUTO: 31.2 PG (ref 26–34)
MCHC RBC AUTO-ENTMCNC: 33.4 G/DL (ref 32–36)
MCV RBC AUTO: 94 FL (ref 80–100)
MONOCYTES # BLD AUTO: 0.98 X10*3/UL (ref 0.05–0.8)
MONOCYTES NFR BLD AUTO: 9.7 %
NEUTROPHILS # BLD AUTO: 6.69 X10*3/UL (ref 1.6–5.5)
NEUTROPHILS NFR BLD AUTO: 66.4 %
NRBC BLD-RTO: 0 /100 WBCS (ref 0–0)
PLATELET # BLD AUTO: 225 X10*3/UL (ref 150–450)
POTASSIUM SERPL-SCNC: 4.4 MMOL/L (ref 3.5–5.3)
PROT SERPL-MCNC: 6.4 G/DL (ref 6.4–8.2)
RBC # BLD AUTO: 4.13 X10*6/UL (ref 4–5.2)
SARS-COV-2 RNA RESP QL NAA+PROBE: NOT DETECTED
SODIUM SERPL-SCNC: 136 MMOL/L (ref 136–145)
WBC # BLD AUTO: 10.1 X10*3/UL (ref 4.4–11.3)

## 2024-06-19 PROCEDURE — 84484 ASSAY OF TROPONIN QUANT: CPT | Performed by: EMERGENCY MEDICINE

## 2024-06-19 PROCEDURE — 71045 X-RAY EXAM CHEST 1 VIEW: CPT

## 2024-06-19 PROCEDURE — 71045 X-RAY EXAM CHEST 1 VIEW: CPT | Performed by: RADIOLOGY

## 2024-06-19 PROCEDURE — 99285 EMERGENCY DEPT VISIT HI MDM: CPT | Mod: 25

## 2024-06-19 PROCEDURE — 94762 N-INVAS EAR/PLS OXIMTRY CONT: CPT

## 2024-06-19 PROCEDURE — 2500000002 HC RX 250 W HCPCS SELF ADMINISTERED DRUGS (ALT 637 FOR MEDICARE OP, ALT 636 FOR OP/ED): Mod: MUE | Performed by: EMERGENCY MEDICINE

## 2024-06-19 PROCEDURE — 36415 COLL VENOUS BLD VENIPUNCTURE: CPT | Performed by: EMERGENCY MEDICINE

## 2024-06-19 PROCEDURE — 85379 FIBRIN DEGRADATION QUANT: CPT | Performed by: EMERGENCY MEDICINE

## 2024-06-19 PROCEDURE — 94640 AIRWAY INHALATION TREATMENT: CPT | Mod: MUE

## 2024-06-19 PROCEDURE — 71275 CT ANGIOGRAPHY CHEST: CPT

## 2024-06-19 PROCEDURE — 2550000001 HC RX 255 CONTRASTS: Performed by: EMERGENCY MEDICINE

## 2024-06-19 PROCEDURE — 93005 ELECTROCARDIOGRAM TRACING: CPT

## 2024-06-19 PROCEDURE — 71275 CT ANGIOGRAPHY CHEST: CPT | Performed by: RADIOLOGY

## 2024-06-19 PROCEDURE — 2500000004 HC RX 250 GENERAL PHARMACY W/ HCPCS (ALT 636 FOR OP/ED): Performed by: EMERGENCY MEDICINE

## 2024-06-19 PROCEDURE — 2500000002 HC RX 250 W HCPCS SELF ADMINISTERED DRUGS (ALT 637 FOR MEDICARE OP, ALT 636 FOR OP/ED): Mod: MUE | Performed by: NURSE PRACTITIONER

## 2024-06-19 PROCEDURE — 80053 COMPREHEN METABOLIC PANEL: CPT | Performed by: EMERGENCY MEDICINE

## 2024-06-19 PROCEDURE — 9420000001 HC RT PATIENT EDUCATION 5 MIN

## 2024-06-19 PROCEDURE — 96375 TX/PRO/DX INJ NEW DRUG ADDON: CPT

## 2024-06-19 PROCEDURE — 2500000005 HC RX 250 GENERAL PHARMACY W/O HCPCS: Performed by: EMERGENCY MEDICINE

## 2024-06-19 PROCEDURE — 83880 ASSAY OF NATRIURETIC PEPTIDE: CPT | Performed by: EMERGENCY MEDICINE

## 2024-06-19 PROCEDURE — 2500000002 HC RX 250 W HCPCS SELF ADMINISTERED DRUGS (ALT 637 FOR MEDICARE OP, ALT 636 FOR OP/ED): Mod: MUE

## 2024-06-19 PROCEDURE — 84484 ASSAY OF TROPONIN QUANT: CPT | Mod: 91 | Performed by: EMERGENCY MEDICINE

## 2024-06-19 PROCEDURE — 94664 DEMO&/EVAL PT USE INHALER: CPT | Mod: MUE

## 2024-06-19 PROCEDURE — 87040 BLOOD CULTURE FOR BACTERIA: CPT | Mod: 91,SAMLAB | Performed by: EMERGENCY MEDICINE

## 2024-06-19 PROCEDURE — 1100000001 HC PRIVATE ROOM DAILY

## 2024-06-19 PROCEDURE — 85025 COMPLETE CBC W/AUTO DIFF WBC: CPT | Performed by: EMERGENCY MEDICINE

## 2024-06-19 PROCEDURE — 96365 THER/PROPH/DIAG IV INF INIT: CPT

## 2024-06-19 PROCEDURE — 2500000001 HC RX 250 WO HCPCS SELF ADMINISTERED DRUGS (ALT 637 FOR MEDICARE OP): Performed by: NURSE PRACTITIONER

## 2024-06-19 PROCEDURE — 87636 SARSCOV2 & INF A&B AMP PRB: CPT | Performed by: EMERGENCY MEDICINE

## 2024-06-19 RX ORDER — ASPIRIN 81 MG/1
81 TABLET ORAL NIGHTLY
Status: DISCONTINUED | OUTPATIENT
Start: 2024-06-19 | End: 2024-06-23 | Stop reason: HOSPADM

## 2024-06-19 RX ORDER — IPRATROPIUM BROMIDE AND ALBUTEROL SULFATE 2.5; .5 MG/3ML; MG/3ML
3 SOLUTION RESPIRATORY (INHALATION)
Status: DISCONTINUED | OUTPATIENT
Start: 2024-06-19 | End: 2024-06-23 | Stop reason: HOSPADM

## 2024-06-19 RX ORDER — SODIUM BICARBONATE 325 MG/1
1300 TABLET ORAL 2 TIMES DAILY
Status: DISCONTINUED | OUTPATIENT
Start: 2024-06-19 | End: 2024-06-23 | Stop reason: HOSPADM

## 2024-06-19 RX ORDER — CHOLECALCIFEROL (VITAMIN D3) 25 MCG
5000 TABLET ORAL DAILY
Status: DISCONTINUED | OUTPATIENT
Start: 2024-06-19 | End: 2024-06-23 | Stop reason: HOSPADM

## 2024-06-19 RX ORDER — IPRATROPIUM BROMIDE AND ALBUTEROL SULFATE 2.5; .5 MG/3ML; MG/3ML
3 SOLUTION RESPIRATORY (INHALATION) ONCE
Status: COMPLETED | OUTPATIENT
Start: 2024-06-19 | End: 2024-06-19

## 2024-06-19 RX ORDER — LEVOTHYROXINE SODIUM 100 UG/1
100 TABLET ORAL DAILY
Status: DISCONTINUED | OUTPATIENT
Start: 2024-06-19 | End: 2024-06-23 | Stop reason: HOSPADM

## 2024-06-19 RX ORDER — ACETAMINOPHEN 500 MG
1 TABLET ORAL DAILY
COMMUNITY

## 2024-06-19 RX ORDER — CEFTRIAXONE 2 G/50ML
2 INJECTION, SOLUTION INTRAVENOUS EVERY 24 HOURS
Status: DISCONTINUED | OUTPATIENT
Start: 2024-06-20 | End: 2024-06-23 | Stop reason: HOSPADM

## 2024-06-19 RX ORDER — CARVEDILOL 25 MG/1
25 TABLET ORAL 2 TIMES DAILY
Status: DISCONTINUED | OUTPATIENT
Start: 2024-06-19 | End: 2024-06-23 | Stop reason: HOSPADM

## 2024-06-19 RX ORDER — SPIRONOLACTONE 25 MG/1
25 TABLET ORAL DAILY
Status: DISCONTINUED | OUTPATIENT
Start: 2024-06-19 | End: 2024-06-23 | Stop reason: HOSPADM

## 2024-06-19 RX ORDER — IPRATROPIUM BROMIDE AND ALBUTEROL SULFATE 2.5; .5 MG/3ML; MG/3ML
SOLUTION RESPIRATORY (INHALATION)
Status: COMPLETED
Start: 2024-06-19 | End: 2024-06-19

## 2024-06-19 RX ORDER — ONDANSETRON HYDROCHLORIDE 2 MG/ML
4 INJECTION, SOLUTION INTRAVENOUS EVERY 6 HOURS PRN
Status: DISCONTINUED | OUTPATIENT
Start: 2024-06-19 | End: 2024-06-23 | Stop reason: HOSPADM

## 2024-06-19 RX ORDER — CEFTRIAXONE 2 G/50ML
2 INJECTION, SOLUTION INTRAVENOUS ONCE
Status: COMPLETED | OUTPATIENT
Start: 2024-06-19 | End: 2024-06-19

## 2024-06-19 RX ORDER — HEPARIN SODIUM 5000 [USP'U]/ML
5000 INJECTION, SOLUTION INTRAVENOUS; SUBCUTANEOUS EVERY 8 HOURS
Status: DISCONTINUED | OUTPATIENT
Start: 2024-06-19 | End: 2024-06-23 | Stop reason: HOSPADM

## 2024-06-19 RX ORDER — ERGOCALCIFEROL 1.25 MG/1
1.25 CAPSULE ORAL 2 TIMES DAILY
COMMUNITY
End: 2024-06-19 | Stop reason: ENTERED-IN-ERROR

## 2024-06-19 RX ORDER — ROSUVASTATIN CALCIUM 20 MG/1
20 TABLET, COATED ORAL NIGHTLY
Status: DISCONTINUED | OUTPATIENT
Start: 2024-06-19 | End: 2024-06-23 | Stop reason: HOSPADM

## 2024-06-19 RX ADMIN — CEFTRIAXONE SODIUM 2 G: 2 INJECTION, SOLUTION INTRAVENOUS at 10:51

## 2024-06-19 RX ADMIN — Medication 5000 UNITS: at 14:33

## 2024-06-19 RX ADMIN — SODIUM BICARBONATE 1300 MG: 325 TABLET ORAL at 23:42

## 2024-06-19 RX ADMIN — ASPIRIN 81 MG: 81 TABLET, COATED ORAL at 20:32

## 2024-06-19 RX ADMIN — IPRATROPIUM BROMIDE AND ALBUTEROL SULFATE 3 ML: 2.5; .5 SOLUTION RESPIRATORY (INHALATION) at 23:06

## 2024-06-19 RX ADMIN — Medication 1 L/MIN: at 13:20

## 2024-06-19 RX ADMIN — LEVOTHYROXINE SODIUM 100 MCG: 0.1 TABLET ORAL at 14:34

## 2024-06-19 RX ADMIN — IPRATROPIUM BROMIDE AND ALBUTEROL SULFATE 3 ML: 2.5; .5 SOLUTION RESPIRATORY (INHALATION) at 13:19

## 2024-06-19 RX ADMIN — SODIUM BICARBONATE 1300 MG: 325 TABLET ORAL at 14:34

## 2024-06-19 RX ADMIN — IPRATROPIUM BROMIDE AND ALBUTEROL SULFATE 3 ML: 2.5; .5 SOLUTION RESPIRATORY (INHALATION) at 18:35

## 2024-06-19 RX ADMIN — BENZOCAINE AND MENTHOL 1 LOZENGE: 15; 3.6 LOZENGE ORAL at 17:10

## 2024-06-19 RX ADMIN — Medication 2 L/MIN: at 18:35

## 2024-06-19 RX ADMIN — NIFEDIPINE 90 MG: 60 TABLET, EXTENDED RELEASE ORAL at 14:34

## 2024-06-19 RX ADMIN — IOHEXOL 68 ML: 350 INJECTION, SOLUTION INTRAVENOUS at 09:32

## 2024-06-19 RX ADMIN — SPIRONOLACTONE 25 MG: 25 TABLET ORAL at 15:00

## 2024-06-19 RX ADMIN — IPRATROPIUM BROMIDE AND ALBUTEROL SULFATE 3 ML: 2.5; .5 SOLUTION RESPIRATORY (INHALATION) at 10:09

## 2024-06-19 RX ADMIN — IPRATROPIUM BROMIDE AND ALBUTEROL SULFATE 3 ML: .5; 3 SOLUTION RESPIRATORY (INHALATION) at 10:09

## 2024-06-19 RX ADMIN — Medication 2 L/MIN: at 23:07

## 2024-06-19 RX ADMIN — IPRATROPIUM BROMIDE AND ALBUTEROL SULFATE 3 ML: .5; 3 SOLUTION RESPIRATORY (INHALATION) at 07:25

## 2024-06-19 RX ADMIN — CARVEDILOL 25 MG: 25 TABLET, FILM COATED ORAL at 23:18

## 2024-06-19 RX ADMIN — ROSUVASTATIN CALCIUM 20 MG: 20 TABLET, FILM COATED ORAL at 20:32

## 2024-06-19 RX ADMIN — Medication 1 L/MIN: at 07:34

## 2024-06-19 RX ADMIN — AZITHROMYCIN 500 MG: 500 INJECTION, POWDER, LYOPHILIZED, FOR SOLUTION INTRAVENOUS at 11:30

## 2024-06-19 RX ADMIN — CARVEDILOL 25 MG: 25 TABLET, FILM COATED ORAL at 14:34

## 2024-06-19 SDOH — SOCIAL STABILITY: SOCIAL INSECURITY: ARE THERE ANY APPARENT SIGNS OF INJURIES/BEHAVIORS THAT COULD BE RELATED TO ABUSE/NEGLECT?: NO

## 2024-06-19 SDOH — SOCIAL STABILITY: SOCIAL INSECURITY: HAVE YOU HAD ANY THOUGHTS OF HARMING ANYONE ELSE?: NO

## 2024-06-19 SDOH — SOCIAL STABILITY: SOCIAL INSECURITY: DO YOU FEEL UNSAFE GOING BACK TO THE PLACE WHERE YOU ARE LIVING?: NO

## 2024-06-19 SDOH — SOCIAL STABILITY: SOCIAL INSECURITY: HAVE YOU HAD THOUGHTS OF HARMING ANYONE ELSE?: NO

## 2024-06-19 SDOH — SOCIAL STABILITY: SOCIAL INSECURITY: WERE YOU ABLE TO COMPLETE ALL THE BEHAVIORAL HEALTH SCREENINGS?: YES

## 2024-06-19 SDOH — SOCIAL STABILITY: SOCIAL INSECURITY: DO YOU FEEL ANYONE HAS EXPLOITED OR TAKEN ADVANTAGE OF YOU FINANCIALLY OR OF YOUR PERSONAL PROPERTY?: NO

## 2024-06-19 SDOH — SOCIAL STABILITY: SOCIAL INSECURITY: HAS ANYONE EVER THREATENED TO HURT YOUR FAMILY OR YOUR PETS?: NO

## 2024-06-19 SDOH — SOCIAL STABILITY: SOCIAL INSECURITY: ARE YOU OR HAVE YOU BEEN THREATENED OR ABUSED PHYSICALLY, EMOTIONALLY, OR SEXUALLY BY ANYONE?: NO

## 2024-06-19 SDOH — SOCIAL STABILITY: SOCIAL INSECURITY: ABUSE: ADULT

## 2024-06-19 SDOH — SOCIAL STABILITY: SOCIAL INSECURITY: DOES ANYONE TRY TO KEEP YOU FROM HAVING/CONTACTING OTHER FRIENDS OR DOING THINGS OUTSIDE YOUR HOME?: NO

## 2024-06-19 ASSESSMENT — COGNITIVE AND FUNCTIONAL STATUS - GENERAL
DAILY ACTIVITIY SCORE: 24
PATIENT BASELINE BEDBOUND: NO
MOBILITY SCORE: 24

## 2024-06-19 ASSESSMENT — ACTIVITIES OF DAILY LIVING (ADL)
FEEDING YOURSELF: INDEPENDENT
GROOMING: INDEPENDENT
HEARING - RIGHT EAR: FUNCTIONAL
DRESSING YOURSELF: INDEPENDENT
BATHING: INDEPENDENT
LACK_OF_TRANSPORTATION: NO
JUDGMENT_ADEQUATE_SAFELY_COMPLETE_DAILY_ACTIVITIES: YES
ADEQUATE_TO_COMPLETE_ADL: YES
WALKS IN HOME: INDEPENDENT
ASSISTIVE_DEVICE: EYEGLASSES;DENTURES UPPER;DENTURES LOWER
TOILETING: INDEPENDENT
PATIENT'S MEMORY ADEQUATE TO SAFELY COMPLETE DAILY ACTIVITIES?: YES
HEARING - LEFT EAR: FUNCTIONAL

## 2024-06-19 ASSESSMENT — PATIENT HEALTH QUESTIONNAIRE - PHQ9
2. FEELING DOWN, DEPRESSED OR HOPELESS: NOT AT ALL
1. LITTLE INTEREST OR PLEASURE IN DOING THINGS: NOT AT ALL
SUM OF ALL RESPONSES TO PHQ9 QUESTIONS 1 & 2: 0

## 2024-06-19 ASSESSMENT — LIFESTYLE VARIABLES
SKIP TO QUESTIONS 9-10: 1
AUDIT-C TOTAL SCORE: 1
HOW MANY STANDARD DRINKS CONTAINING ALCOHOL DO YOU HAVE ON A TYPICAL DAY: 1 OR 2
HOW OFTEN DO YOU HAVE 6 OR MORE DRINKS ON ONE OCCASION: NEVER
HOW OFTEN DO YOU HAVE A DRINK CONTAINING ALCOHOL: MONTHLY OR LESS
PRESCIPTION_ABUSE_PAST_12_MONTHS: NO
SUBSTANCE_ABUSE_PAST_12_MONTHS: NO
AUDIT-C TOTAL SCORE: 1

## 2024-06-19 ASSESSMENT — ENCOUNTER SYMPTOMS
COUGH: 1
WHEEZING: 1
SHORTNESS OF BREATH: 1

## 2024-06-19 ASSESSMENT — PAIN SCALES - GENERAL
PAINLEVEL_OUTOF10: 0 - NO PAIN

## 2024-06-19 ASSESSMENT — COLUMBIA-SUICIDE SEVERITY RATING SCALE - C-SSRS
6. HAVE YOU EVER DONE ANYTHING, STARTED TO DO ANYTHING, OR PREPARED TO DO ANYTHING TO END YOUR LIFE?: NO
1. IN THE PAST MONTH, HAVE YOU WISHED YOU WERE DEAD OR WISHED YOU COULD GO TO SLEEP AND NOT WAKE UP?: NO
2. HAVE YOU ACTUALLY HAD ANY THOUGHTS OF KILLING YOURSELF?: NO

## 2024-06-19 ASSESSMENT — PAIN - FUNCTIONAL ASSESSMENT
PAIN_FUNCTIONAL_ASSESSMENT: 0-10
PAIN_FUNCTIONAL_ASSESSMENT: 0-10

## 2024-06-19 NOTE — CARE PLAN
Problem: Fall/Injury  Goal: Not fall by end of shift  Outcome: Progressing  Goal: Be free from injury by end of the shift  Outcome: Progressing  Goal: Verbalize understanding of personal risk factors for fall in the hospital  Outcome: Progressing  Goal: Verbalize understanding of risk factor reduction measures to prevent injury from fall in the home  Outcome: Progressing  Goal: Use assistive devices by end of the shift  Outcome: Progressing  Goal: Pace activities to prevent fatigue by end of the shift  Outcome: Progressing     Problem: Respiratory  Goal: Clear secretions with interventions this shift  Outcome: Progressing  Goal: Minimize anxiety/maximize coping throughout shift  Outcome: Progressing  Goal: Minimal/no exertional discomfort or dyspnea this shift  Outcome: Progressing  Goal: No signs of respiratory distress (eg. Use of accessory muscles. Peds grunting)  Outcome: Progressing  Goal: Patent airway maintained this shift  Outcome: Progressing  Goal: Tolerate pulmonary toileting this shift  Outcome: Progressing  Goal: Verbalize decreased shortness of breath this shift  Outcome: Progressing  Goal: Wean oxygen to maintain O2 saturation per order/standard this shift  Outcome: Progressing  Goal: Increase self care and/or family involvement in next 24 hours  Outcome: Progressing     Problem: Pain  Goal: Takes deep breaths with improved pain control throughout the shift  Outcome: Progressing  Goal: Turns in bed with improved pain control throughout the shift  Outcome: Progressing  Goal: Walks with improved pain control throughout the shift  Outcome: Progressing  Goal: Performs ADL's with improved pain control throughout shift  Outcome: Progressing  Goal: Participates in PT with improved pain control throughout the shift  Outcome: Progressing  Goal: Free from opioid side effects throughout the shift  Outcome: Progressing  Goal: Free from acute confusion related to pain meds throughout the shift  Outcome:  Progressing   The patient's goals for the shift include breathe and sleep    The clinical goals for the shift include monitor oxygen levels, pt will deny shortness of breath

## 2024-06-19 NOTE — H&P
History Of Present Illness  Valorie Lopez is a 78 y.o. female presenting with sob. Patient has a past medical history of CKD stage III, KASSI on cpap at , CAD with 3 cardiac stent placement, hypothyroid, breast cancer, Pt states that for about the last week she has been sob and noticed significant wheezing but last night she needed to use her  oxygen due to her not being able to catch her breath at all and when she checked her pulse ox she found herself to be at 80%, she said that she put herself on 3LNC. She also states that she has been coughing a lot but that this has been non-productive. She denies any sick contacts. She was not found to be hypoxic in the ED but was placed on 2L due to pt request, she was negative for Covid and flu,  BNP at 368, D-dimer was elevated at 1328, CT chest was negative for PE but was found to have bibasilar pneumonia with small bilateral pleural effusions.      Past Medical History  She has a past medical history of Old myocardial infarction (04/06/2018), Personal history of malignant neoplasm of breast, Personal history of other diseases of the circulatory system (04/06/2018), Personal history of other diseases of the female genital tract (04/06/2018), Personal history of other endocrine, nutritional and metabolic disease (04/06/2018), and Personal history of other medical treatment.    Surgical History  She has a past surgical history that includes Incisional breast biopsy (04/06/2018); Other surgical history (04/06/2018); Appendectomy (04/06/2018); Other surgical history (09/13/2021); Colonoscopy (05/16/2018); Other surgical history (12/13/2019); Other surgical history (12/13/2019); Other surgical history (12/13/2019); Other surgical history (12/13/2019); Other surgical history (12/13/2019); Other surgical history (12/13/2019); Other surgical history (2009); Cataract extraction (Bilateral, 02/2024); and BI mammo bilateral screening (08/30/2023).     Social History  She  reports that she has never smoked. She has never used smokeless tobacco. She reports current alcohol use. She reports that she does not use drugs.    Family History  Family History   Problem Relation Name Age of Onset    Hypertension Mother      Heart attack Mother      Heart disease Mother      Skin cancer Father      Heart attack Father      Kidney disease Father      Heart disease Father      Asthma Sister      Hypertension Daughter          Allergies  Amoxicillin-pot clavulanate, Citalopram, Hydralazine, Levofloxacin, Nitrofurantoin monohyd/m-cryst, Sulfa (sulfonamide antibiotics), and Thiazides    Review of Systems   Respiratory:  Positive for cough, shortness of breath and wheezing.    All other systems reviewed and are negative.       Physical Exam  General: awake, alert and oriented. No acute distress, sob noted with conversation   Skin: skin warm dry and intact   HEENT: normocephalic, atraumatic; conjunctivae are clear without exudates or hemorrhage. Sclera is non-icteric. Eyelids are normal in appearance without swelling or lesions. Hearing intact. Nares are patent bilaterally. Moist mucous membranes.   Cardiovascular: heart rate regular, tachycardic at times   Respiratory: bilateral lung sounds diminished   Gastrointestinal: Bsx4, non-distended, non-tender  Genitourinary: exam deferred  Musculoskeletal: no deformities  Extremities: trace eladia lower ext edema   Neurological: no focal deficits  Psychiatric: appropriate mood and affect; good judgment and insight     Last Recorded Vitals  /71 (BP Location: Left arm, Patient Position: Sitting)   Pulse 77   Temp 36.6 °C (97.8 °F) (Temporal)   Resp (!) 24   Wt 95.2 kg (209 lb 14.1 oz)   SpO2 93%     Relevant Results  Scheduled medications  aspirin, 81 mg, oral, Daily  [START ON 6/20/2024] azithromycin, 500 mg, intravenous, q24h  carvedilol, 25 mg, oral, BID  [START ON 6/20/2024] cefTRIAXone, 2 g, intravenous, q24h  cholecalciferol, 5,000 Units, oral,  Daily  heparin (porcine), 5,000 Units, subcutaneous, q8h  ipratropium-albuteroL, 3 mL, nebulization, q6h  levothyroxine, 100 mcg, oral, Daily  NIFEdipine ER, 90 mg, oral, Daily  rosuvastatin, 20 mg, oral, Nightly  sodium bicarbonate, 1,300 mg, oral, BID  spironolactone, 25 mg, oral, Daily      Continuous medications     PRN medications  PRN medications: oxygen    Results for orders placed or performed during the hospital encounter of 06/19/24 (from the past 24 hour(s))   CBC and Auto Differential   Result Value Ref Range    WBC 10.1 4.4 - 11.3 x10*3/uL    nRBC 0.0 0.0 - 0.0 /100 WBCs    RBC 4.13 4.00 - 5.20 x10*6/uL    Hemoglobin 12.9 12.0 - 16.0 g/dL    Hematocrit 38.6 36.0 - 46.0 %    MCV 94 80 - 100 fL    MCH 31.2 26.0 - 34.0 pg    MCHC 33.4 32.0 - 36.0 g/dL    RDW 12.8 11.5 - 14.5 %    Platelets 225 150 - 450 x10*3/uL    Neutrophils % 66.4 40.0 - 80.0 %    Immature Granulocytes %, Automated 0.4 0.0 - 0.9 %    Lymphocytes % 20.7 13.0 - 44.0 %    Monocytes % 9.7 2.0 - 10.0 %    Eosinophils % 2.1 0.0 - 6.0 %    Basophils % 0.7 0.0 - 2.0 %    Neutrophils Absolute 6.69 (H) 1.60 - 5.50 x10*3/uL    Immature Granulocytes Absolute, Automated 0.04 0.00 - 0.50 x10*3/uL    Lymphocytes Absolute 2.08 0.80 - 3.00 x10*3/uL    Monocytes Absolute 0.98 (H) 0.05 - 0.80 x10*3/uL    Eosinophils Absolute 0.21 0.00 - 0.40 x10*3/uL    Basophils Absolute 0.07 0.00 - 0.10 x10*3/uL   Comprehensive metabolic panel   Result Value Ref Range    Glucose 161 (H) 74 - 99 mg/dL    Sodium 136 136 - 145 mmol/L    Potassium 4.4 3.5 - 5.3 mmol/L    Chloride 108 (H) 98 - 107 mmol/L    Bicarbonate 20 (L) 21 - 32 mmol/L    Anion Gap 12 10 - 20 mmol/L    Urea Nitrogen 25 (H) 6 - 23 mg/dL    Creatinine 1.53 (H) 0.50 - 1.05 mg/dL    eGFR 34 (L) >60 mL/min/1.73m*2    Calcium 9.0 8.6 - 10.3 mg/dL    Albumin 4.0 3.4 - 5.0 g/dL    Alkaline Phosphatase 63 33 - 136 U/L    Total Protein 6.4 6.4 - 8.2 g/dL    AST 22 9 - 39 U/L    Bilirubin, Total 1.1 0.0 - 1.2  mg/dL    ALT 18 7 - 45 U/L   B-Type Natriuretic Peptide   Result Value Ref Range     (H) 0 - 99 pg/mL   D-Dimer, VTE Exclusion   Result Value Ref Range    D-Dimer, Quantitative VTE Exclusion 1,328 (H) <=500 ng/mL FEU   Troponin I, High Sensitivity, Initial   Result Value Ref Range    Troponin I, High Sensitivity 11 0 - 13 ng/L   Influenza A, and B PCR   Result Value Ref Range    Flu A Result Not Detected Not Detected    Flu B Result Not Detected Not Detected   Sars-CoV-2 PCR   Result Value Ref Range    Coronavirus 2019, PCR Not Detected Not Detected   Troponin, High Sensitivity, 1 Hour   Result Value Ref Range    Troponin I, High Sensitivity 11 0 - 13 ng/L     CT angio chest for pulmonary embolism    Result Date: 6/19/2024  Interpreted By:  Mariam Tong, STUDY: CT ANGIO CHEST FOR PULMONARY EMBOLISM;  6/19/2024 9:42 am   INDICATION: Signs/Symptoms:dyspnea.   COMPARISON: 01/03/2022   ACCESSION NUMBER(S): [id number]   ORDERING CLINICIAN: NATE VILLEGAS   TECHNIQUE: Helical data acquisition of the chest was obtained 68 mL Omnipaque 350. Images were reformatted in axial, coronal, and sagittal planes. 3D MIP images were generated and reviewed.   FINDINGS: LUNGS and AIRWAYS: There is bibasilar pneumonia with small bilateral pleural effusions. There is increase in the interstitial markings consistent with interstitial edema.   There is no pulmonary nodule.   JAZZ AND MEDIASTINUM: There is no hilar or mediastinal adenopathy.     HEART and VESSELS: There is no contrast within the thoracic aorta to evaluate for dissection   There is good opacification of the pulmonary arterial system with no embolism.   There is atherosclerotic calcification of the coronary arteries. The study is not optimized for evaluation of coronary arteries.   There is left atrial enlargement.   No evidence of pericardial effusion.   UPPER ABDOMEN: There is atherosclerotic calcification of the proximal abdominal aorta and splenic artery.  There is a 1 cm heavily calcified splenic artery aneurysm in the splenic hilum. There is a 1 cm heavily calcified splenic artery aneurysm in the splenic hilum There are benign calcified splenic granulomas.   CHEST WALL and OSSEOUS STRUCTURES: There are no suspicious osseous lesions. Multilevel degenerative changes are present There is marked calcification in the right breast.       1. No pulmonary embolism. 2. Bibasilar pneumonia with small bilateral pleural effusions. Interstitial edema.   MACRO: None   Signed by: Mariam Tong 6/19/2024 10:13 AM Dictation workstation:   [id number]        Assessment/Plan   Principal Problem:    Acute hypoxemic respiratory failure (Multi)    Valorie Lopez is a 78 y.o. female presenting with sob. Patient has a past medical history of CKD stage III, KASSI on cpap at [facility], CAD with 3 cardiac stent placement, hypothyroid, breast cancer, Pt states that for about the last week she has been sob and noticed significant wheezing but last night she needed to use her  oxygen due to her not being able to catch her breath at all and when she checked her pulse ox she found herself to be at 80%, she said that she put herself on 3LNC. She also states that she has been coughing a lot but that this has been non-productive. She denies any sick contacts. She was not found to be hypoxic in the ED but was placed on 2L due to pt request, she was negative for Covid and flu,  BNP at 368, D-dimer was elevated at 1328, CT chest was negative for PE but was found to have bibasilar pneumonia with small bilateral pleural effusions.     #PNA  -Ct chest -Bibasilar pneumonia with small bilateral pleural effusions.  Interstitial edema.  -continue iv rocephin and azithromycin  -nebulizers  -monitor cont pulse ox, wean off oxygen as able  -will do urine procal and strep     #HTN  -continue home carvedilol, nifedipine,     #CKD stage III  -continue home bicarb    #Hypothyroid   -continue home synthroid      #Hyperlipidemia  -continue home crestor          Cheryl Bryant, APRN-CNP

## 2024-06-19 NOTE — ED PROVIDER NOTES
HPI   No chief complaint on file.      Limitations to History: None    HPI: 78-year-old female presents with concern for shortness of breath and wheezing.  Began wheezing approximately 1 week ago.  Shortness of breath progressed over the past 24 hours.  Denies any fever, chills, cough, chest pain, nausea, vomiting, abdominal pain, urinary symptoms.  No sick contacts.    Additional History Obtained from:  at the bedside    ------------------------------------------------------------------------------------------------------------------------------------------  Physical Exam:    VS: As documented in the triage note and EMR flowsheet from this visit were reviewed.    Appearance: Alert. cooperative,  in no acute distress.   Skin: Intact,  dry skin, no lesions, rash, petechiae or purpura.   Eyes: PERRLA, EOMs intact,  Conjunctiva pink with no redness or exudates.   HENT: Normocephalic, atraumatic. Nares patent. No intraoral lesions.   Neck: Supple, without meningismus. Trachea at midline. No lymphadenopathy.  Pulmonary: Clear bilaterally with good chest wall excursion. No rales, rhonchi or wheezing. No accessory muscle use or stridor.  Cardiac: Regular rate and rhythm, no rubs, murmurs, or gallops.   Abdomen: Abdomen is soft, nontender, and nondistended.  No palpable organomegaly.  No rebound or guarding.  No CVA tenderness. Nonsurgical abdomen.  Genitourinary: Exam deferred.  Musculoskeletal: Full range of motion.  Pulses full and equal. No cyanosis, clubbing, or edema.  Neurological:  Cranial nerves are grossly intact, grossly normal sensation, no weakness, no focal findings identified.  Psychiatric: Appropriate mood and affect.                            No data recorded                   Patient History   Past Medical History:   Diagnosis Date    Old myocardial infarction 04/06/2018    History of myocardial infarction    Personal history of malignant neoplasm of breast     History of malignant neoplasm of  breast    Personal history of other diseases of the circulatory system 04/06/2018    History of hypertension    Personal history of other diseases of the female genital tract 04/06/2018    History of uterine prolapse    Personal history of other endocrine, nutritional and metabolic disease 04/06/2018    History of hyperlipidemia    Personal history of other medical treatment     H/O mammogram     Past Surgical History:   Procedure Laterality Date    APPENDECTOMY  04/06/2018    Appendectomy    BI MAMMO BILATERAL SCREENING  08/30/2023    cat 2    CATARACT EXTRACTION Bilateral 02/2024    COLONOSCOPY  05/16/2018    DR MELGAR    INCISIONAL BREAST BIOPSY  04/06/2018    Incisional Breast Biopsy    OTHER SURGICAL HISTORY  04/06/2018    Breast Surgery Revision Of Reconstructed Right Breast    OTHER SURGICAL HISTORY  09/13/2021    Hip replacement    OTHER SURGICAL HISTORY  12/13/2019    Tubal ligation    OTHER SURGICAL HISTORY  12/13/2019    Kidney surgery    OTHER SURGICAL HISTORY  12/13/2019    Hysterectomy    OTHER SURGICAL HISTORY  12/13/2019    Cardiac catheterization with stent placement    OTHER SURGICAL HISTORY  12/13/2019    Cardiac catheterization    OTHER SURGICAL HISTORY  12/13/2019    Myringotomy with tube placement    OTHER SURGICAL HISTORY  2009    Right mastectomy     Family History   Problem Relation Name Age of Onset    Hypertension Mother      Heart attack Mother      Heart disease Mother      Skin cancer Father      Heart attack Father      Kidney disease Father      Heart disease Father      Asthma Sister      Hypertension Daughter       Social History     Tobacco Use    Smoking status: Never    Smokeless tobacco: Never   Vaping Use    Vaping status: Never Used   Substance Use Topics    Alcohol use: Yes    Drug use: Never       Physical Exam   ED Triage Vitals   Temp Pulse Resp BP   -- -- -- --      SpO2 Temp src Heart Rate Source Patient Position   -- -- -- --      BP Location FiO2 (%)     -- --        Physical Exam    ED Course & MDM   Diagnoses as of 06/19/24 9649   Acute hypoxemic respiratory failure (Multi)   Pneumonia of both lower lobes due to infectious organism       Medical Decision Making  Labs Reviewed  CBC WITH AUTO DIFFERENTIAL - Abnormal     WBC                           10.1                   nRBC                          0.0                    RBC                           4.13                   Hemoglobin                    12.9                   Hematocrit                    38.6                   MCV                           94                     MCH                           31.2                   MCHC                          33.4                   RDW                           12.8                   Platelets                     225                    Neutrophils %                 66.4                   Immature Granulocytes %, Automated   0.4                    Lymphocytes %                 20.7                   Monocytes %                   9.7                    Eosinophils %                 2.1                    Basophils %                   0.7                    Neutrophils Absolute          6.69 (*)               Immature Granulocytes Absolute, Au*   0.04                   Lymphocytes Absolute          2.08                   Monocytes Absolute            0.98 (*)               Eosinophils Absolute          0.21                   Basophils Absolute            0.07                COMPREHENSIVE METABOLIC PANEL - Abnormal     Glucose                       161 (*)                Sodium                        136                    Potassium                     4.4                    Chloride                      108 (*)                Bicarbonate                   20 (*)                 Anion Gap                     12                     Urea Nitrogen                 25 (*)                 Creatinine                    1.53 (*)               eGFR                          34 (*)                  Calcium                       9.0                    Albumin                       4.0                    Alkaline Phosphatase          63                     Total Protein                 6.4                    AST                           22                     Bilirubin, Total              1.1                    ALT                           18                  B-TYPE NATRIURETIC PEPTIDE - Abnormal     BNP                           368 (*)                    Narrative:    <100 pg/mL - Heart failure unlikely                  100-299 pg/mL - Intermediate probability of acute heart                                  failure exacerbation. Correlate with clinical                                  context and patient history.                    >=300 pg/mL - Heart Failure likely. Correlate with clinical                                  context and patient history.                                    BNP testing is performed using different testing methodology at [facility] than at other [facility]. Direct result comparisons should only be made within the same method.                     D-DIMER, VTE EXCLUSION - Abnormal     D-Dimer, Quantitative VTE Exclusion   1,328 (*)                   Narrative: The VTE Exclusion D-Dimer assay is reported in ng/mL Fibrinogen Equivalent Units (FEU).                                    Per 's instructions for use, a value of less than 500 ng/mL (FEU) may help to exclude DVT or PE in outpatients when the assay is used with a clinical pretest probability assessment.(AEMR must utilize and document eCalc 'Wells Score Deep Vein Thrombosis Risk' for DVT exclusion only. Emergency Department should utilize  Guidelines for Emergency Department Use of the VTE Exclusion D-Dimer and Clinical Pretest probability assessment model for DVT or PE exclusion.)  INFLUENZA A AND B PCR - Normal     Flu A Result                                         Flu B Result                                              Narrative: This assay is an in vitro diagnostic multiplex nucleic acid amplification test for the detection and discrimination of Influenza A & B from nasopharyngeal specimens, and has been validated for use at [facility]. Negative results do not preclude Influenza A/B infections, and should not be used as the sole basis for diagnosis, treatment, or other management decisions. If Influenza A/B and RSV PCR results are negative, testing for Parainfluenza virus, Adenovirus and Metapneumovirus is routinely performed for [facility] [place] in[place], and is available on other patients by placing an add-on request.  SARS-COV-2 PCR - Normal     Coronavirus 2019, PCR                                    Narrative: This assay has received FDA Emergency Use Authorization (EUA) and is only authorized for the duration of time that circumstances exist to justify the authorization of the emergency use of in vitro diagnostic tests for the detection of SARS-CoV-2 virus and/or diagnosis of COVID-19 infection under section 564(b)(1) of the Act, 21 U.S.C. 360bbb-3(b)(1). This assay is an in vitro diagnostic nucleic acid amplification test for the qualitative detection of SARS-CoV-2 from nasopharyngeal specimens and has been validated for use at [facility]. Negative results do not preclude COVID-19 infections and should not be used as the sole basis for diagnosis, treatment, or other management decisions.                    SERIAL TROPONIN-INITIAL - Normal     Troponin I, High Sensitivity   11                         Narrative: Less than 99th percentile of normal range cutoff-                  Female and children under 18 years old <14 ng/L; Male <21 ng/L: Negative                  Repeat testing should be performed if clinically indicated.                                     Female and children under 18 years old 14-50  ng/L; Male 21-50 ng/L:                  Consistent with possible cardiac damage and possible increased clinical                   risk. Serial measurements may help to assess extent of myocardial damage.                                     >50 ng/L: Consistent with cardiac damage, increased clinical risk and                  myocardial infarction. Serial measurements may help assess extent of                   myocardial damage.                                      NOTE: Children less than 1 year old may have higher baseline troponin                   levels and results should be interpreted in conjunction with the overall                   clinical context.                                     NOTE: Troponin I testing is performed using a different                   testing methodology at [facility] than at other                   [facility]. Direct result comparisons should only                   be made within the same method.  SERIAL TROPONIN, 1 HOUR - Normal     Troponin I, High Sensitivity   11                         Narrative: Less than 99th percentile of normal range cutoff-                  Female and children under 18 years old <14 ng/L; Male <21 ng/L: Negative                  Repeat testing should be performed if clinically indicated.                                     Female and children under 18 years old 14-50 ng/L; Male 21-50 ng/L:                  Consistent with possible cardiac damage and possible increased clinical                   risk. Serial measurements may help to assess extent of myocardial damage.                                     >50 ng/L: Consistent with cardiac damage, increased clinical risk and                  myocardial infarction. Serial measurements may help assess extent of                   myocardial damage.                                      NOTE: Children less than 1 year old may have higher baseline troponin                   levels and results  should be interpreted in conjunction with the overall                   clinical context.                                     NOTE: Troponin I testing is performed using a different                   testing methodology at [facility] than at other                   system hospitals. Direct result comparisons should only                   be made within the same method.  BLOOD CULTURE  BLOOD CULTURE  STREPTOCOCCUS PNEUMONIAE ANTIGEN, URINE  LEGIONELLA ANTIGEN, URINE  TROPONIN SERIES- (INITIAL, 1 HR)  CT angio chest for pulmonary embolism   Final Result    1. No pulmonary embolism.    2. Bibasilar pneumonia with small bilateral pleural effusions.    Interstitial edema.          MACRO:    None          Signed by: Mariam Tong 2024 10:13 AM    Dictation workstation:   [id number]     Medical Decision Makin 8-year-old female presents with shortness of breath. Wheezing. Found to have bilateral pneumonia. Elevated D-dimer prompting CTA without pulmonary embolism. Patient treated with DuoNeb breathing treatments. Hypoxemic requiring 1 to 2 L. No history of COPD. Treated with Rocephin and azithromycin. Blood cultures pending.  Case discussed with hospitalist who is agreeable with admission.    Differential Diagnoses Considered: Pneumonia, pneumothorax, ACS, electrolyte abnormality    Escalation of Care:  Appropriate for admission for further treatment and evaluation.    Discussion of Management with Other Providers:   I discussed the patient/results with: Admitting hospitalist.          Procedure  ECG 12 lead    Performed by: Nick Nair DO  Authorized by: Nick Nair DO    ECG interpreted by ED Physician in the absence of a cardiologist: yes    Comments:      EKG interpreted by Dr. Nick Nair: Normal sinus rhythm at 78 bpm.  MA interval 148 ms.  QTc 453 ms.  No evidence of ST elevation or depression at this time.       Nick Nair DO  [id number] [phone]

## 2024-06-20 ENCOUNTER — APPOINTMENT (OUTPATIENT)
Dept: RADIOLOGY | Facility: HOSPITAL | Age: 79
DRG: 193 | End: 2024-06-20
Payer: MEDICARE

## 2024-06-20 LAB
ANION GAP SERPL CALC-SCNC: 12 MMOL/L (ref 10–20)
ARTERIAL PATENCY WRIST A: POSITIVE
BASE EXCESS BLDA CALC-SCNC: -0.2 MMOL/L (ref -2–3)
BNP SERPL-MCNC: 632 PG/ML (ref 0–99)
BODY TEMPERATURE: 37 DEGREES CELSIUS
BUN SERPL-MCNC: 22 MG/DL (ref 6–23)
CALCIUM SERPL-MCNC: 8.8 MG/DL (ref 8.6–10.3)
CHLORIDE SERPL-SCNC: 107 MMOL/L (ref 98–107)
CO2 SERPL-SCNC: 22 MMOL/L (ref 21–32)
CREAT SERPL-MCNC: 1.36 MG/DL (ref 0.5–1.05)
EGFRCR SERPLBLD CKD-EPI 2021: 40 ML/MIN/1.73M*2
ERYTHROCYTE [DISTWIDTH] IN BLOOD BY AUTOMATED COUNT: 13 % (ref 11.5–14.5)
GLUCOSE SERPL-MCNC: 140 MG/DL (ref 74–99)
HCO3 BLDA-SCNC: 22.6 MMOL/L (ref 22–26)
HCT VFR BLD AUTO: 36.5 % (ref 36–46)
HGB BLD-MCNC: 12.1 G/DL (ref 12–16)
INHALED O2 CONCENTRATION: 44 %
MCH RBC QN AUTO: 31.4 PG (ref 26–34)
MCHC RBC AUTO-ENTMCNC: 33.2 G/DL (ref 32–36)
MCV RBC AUTO: 95 FL (ref 80–100)
NRBC BLD-RTO: 0 /100 WBCS (ref 0–0)
OXYHGB MFR BLDA: 83.9 % (ref 94–98)
PCO2 BLDA: 31 MM HG (ref 38–42)
PH BLDA: 7.47 PH (ref 7.38–7.42)
PLATELET # BLD AUTO: 191 X10*3/UL (ref 150–450)
PO2 BLDA: 48 MM HG (ref 85–95)
POTASSIUM SERPL-SCNC: 4.2 MMOL/L (ref 3.5–5.3)
RBC # BLD AUTO: 3.85 X10*6/UL (ref 4–5.2)
SAO2 % BLDA: 86 % (ref 94–100)
SODIUM SERPL-SCNC: 137 MMOL/L (ref 136–145)
SPECIMEN DRAWN FROM PATIENT: ABNORMAL
WBC # BLD AUTO: 9.6 X10*3/UL (ref 4.4–11.3)

## 2024-06-20 PROCEDURE — 36600 WITHDRAWAL OF ARTERIAL BLOOD: CPT

## 2024-06-20 PROCEDURE — 71045 X-RAY EXAM CHEST 1 VIEW: CPT

## 2024-06-20 PROCEDURE — 2500000001 HC RX 250 WO HCPCS SELF ADMINISTERED DRUGS (ALT 637 FOR MEDICARE OP): Performed by: HOSPITALIST

## 2024-06-20 PROCEDURE — 71045 X-RAY EXAM CHEST 1 VIEW: CPT | Performed by: RADIOLOGY

## 2024-06-20 PROCEDURE — 2500000005 HC RX 250 GENERAL PHARMACY W/O HCPCS: Performed by: HOSPITALIST

## 2024-06-20 PROCEDURE — 2500000001 HC RX 250 WO HCPCS SELF ADMINISTERED DRUGS (ALT 637 FOR MEDICARE OP): Performed by: NURSE PRACTITIONER

## 2024-06-20 PROCEDURE — 85027 COMPLETE CBC AUTOMATED: CPT | Performed by: NURSE PRACTITIONER

## 2024-06-20 PROCEDURE — 82374 ASSAY BLOOD CARBON DIOXIDE: CPT | Performed by: NURSE PRACTITIONER

## 2024-06-20 PROCEDURE — 2500000005 HC RX 250 GENERAL PHARMACY W/O HCPCS: Performed by: EMERGENCY MEDICINE

## 2024-06-20 PROCEDURE — 94640 AIRWAY INHALATION TREATMENT: CPT | Mod: MUE

## 2024-06-20 PROCEDURE — 83880 ASSAY OF NATRIURETIC PEPTIDE: CPT | Performed by: NURSE PRACTITIONER

## 2024-06-20 PROCEDURE — 36415 COLL VENOUS BLD VENIPUNCTURE: CPT | Performed by: NURSE PRACTITIONER

## 2024-06-20 PROCEDURE — 87899 AGENT NOS ASSAY W/OPTIC: CPT | Mod: SAMLAB | Performed by: NURSE PRACTITIONER

## 2024-06-20 PROCEDURE — 2020000001 HC ICU ROOM DAILY

## 2024-06-20 PROCEDURE — 2500000005 HC RX 250 GENERAL PHARMACY W/O HCPCS: Performed by: NURSE PRACTITIONER

## 2024-06-20 PROCEDURE — 2500000002 HC RX 250 W HCPCS SELF ADMINISTERED DRUGS (ALT 637 FOR MEDICARE OP, ALT 636 FOR OP/ED): Mod: MUE | Performed by: HOSPITALIST

## 2024-06-20 PROCEDURE — 2500000004 HC RX 250 GENERAL PHARMACY W/ HCPCS (ALT 636 FOR OP/ED): Performed by: NURSE PRACTITIONER

## 2024-06-20 PROCEDURE — 87449 NOS EACH ORGANISM AG IA: CPT | Mod: SAMLAB | Performed by: NURSE PRACTITIONER

## 2024-06-20 PROCEDURE — 94664 DEMO&/EVAL PT USE INHALER: CPT

## 2024-06-20 PROCEDURE — 2500000002 HC RX 250 W HCPCS SELF ADMINISTERED DRUGS (ALT 637 FOR MEDICARE OP, ALT 636 FOR OP/ED): Performed by: NURSE PRACTITIONER

## 2024-06-20 PROCEDURE — 94761 N-INVAS EAR/PLS OXIMETRY MLT: CPT

## 2024-06-20 PROCEDURE — 82805 BLOOD GASES W/O2 SATURATION: CPT | Performed by: NURSE PRACTITIONER

## 2024-06-20 PROCEDURE — 94660 CPAP INITIATION&MGMT: CPT

## 2024-06-20 PROCEDURE — 94799 UNLISTED PULMONARY SVC/PX: CPT

## 2024-06-20 PROCEDURE — 9420000001 HC RT PATIENT EDUCATION 5 MIN

## 2024-06-20 RX ORDER — HYDROXYZINE HYDROCHLORIDE 10 MG/1
10 TABLET, FILM COATED ORAL EVERY 8 HOURS PRN
Status: DISCONTINUED | OUTPATIENT
Start: 2024-06-20 | End: 2024-06-23 | Stop reason: HOSPADM

## 2024-06-20 RX ORDER — IPRATROPIUM BROMIDE AND ALBUTEROL SULFATE 2.5; .5 MG/3ML; MG/3ML
3 SOLUTION RESPIRATORY (INHALATION) EVERY 4 HOURS PRN
Status: DISCONTINUED | OUTPATIENT
Start: 2024-06-20 | End: 2024-06-23 | Stop reason: HOSPADM

## 2024-06-20 RX ORDER — FUROSEMIDE 10 MG/ML
40 INJECTION INTRAMUSCULAR; INTRAVENOUS ONCE
Status: COMPLETED | OUTPATIENT
Start: 2024-06-20 | End: 2024-06-20

## 2024-06-20 RX ORDER — FUROSEMIDE 10 MG/ML
20 INJECTION INTRAMUSCULAR; INTRAVENOUS EVERY 24 HOURS
Status: DISCONTINUED | OUTPATIENT
Start: 2024-06-20 | End: 2024-06-21

## 2024-06-20 RX ADMIN — Medication 9 L/MIN: at 06:03

## 2024-06-20 RX ADMIN — IPRATROPIUM BROMIDE AND ALBUTEROL SULFATE 3 ML: 2.5; .5 SOLUTION RESPIRATORY (INHALATION) at 06:01

## 2024-06-20 RX ADMIN — Medication 60 L/MIN: at 22:17

## 2024-06-20 RX ADMIN — CEFTRIAXONE SODIUM 2 G: 2 INJECTION, SOLUTION INTRAVENOUS at 10:57

## 2024-06-20 RX ADMIN — Medication 60 L/MIN: at 19:50

## 2024-06-20 RX ADMIN — Medication 5000 UNITS: at 09:32

## 2024-06-20 RX ADMIN — IPRATROPIUM BROMIDE AND ALBUTEROL SULFATE 3 ML: 2.5; .5 SOLUTION RESPIRATORY (INHALATION) at 11:06

## 2024-06-20 RX ADMIN — Medication 21 PERCENT: at 11:13

## 2024-06-20 RX ADMIN — CARVEDILOL 25 MG: 25 TABLET, FILM COATED ORAL at 20:26

## 2024-06-20 RX ADMIN — Medication 60 L/MIN: at 18:43

## 2024-06-20 RX ADMIN — Medication 9 L/MIN: at 00:53

## 2024-06-20 RX ADMIN — METHYLPREDNISOLONE SODIUM SUCCINATE 125 MG: 125 INJECTION, POWDER, FOR SOLUTION INTRAMUSCULAR; INTRAVENOUS at 14:31

## 2024-06-20 RX ADMIN — Medication 2 L/MIN: at 11:25

## 2024-06-20 RX ADMIN — NIFEDIPINE 90 MG: 60 TABLET, EXTENDED RELEASE ORAL at 09:46

## 2024-06-20 RX ADMIN — ROSUVASTATIN CALCIUM 20 MG: 20 TABLET, FILM COATED ORAL at 20:27

## 2024-06-20 RX ADMIN — Medication 60 L/MIN: at 14:52

## 2024-06-20 RX ADMIN — IPRATROPIUM BROMIDE AND ALBUTEROL SULFATE 3 ML: 2.5; .5 SOLUTION RESPIRATORY (INHALATION) at 23:08

## 2024-06-20 RX ADMIN — ASPIRIN 81 MG: 81 TABLET, COATED ORAL at 20:26

## 2024-06-20 RX ADMIN — METHYLPREDNISOLONE SODIUM SUCCINATE 40 MG: 40 INJECTION, POWDER, FOR SOLUTION INTRAMUSCULAR; INTRAVENOUS at 23:47

## 2024-06-20 RX ADMIN — SODIUM BICARBONATE 1300 MG: 325 TABLET ORAL at 20:26

## 2024-06-20 RX ADMIN — CARVEDILOL 25 MG: 25 TABLET, FILM COATED ORAL at 09:37

## 2024-06-20 RX ADMIN — IPRATROPIUM BROMIDE AND ALBUTEROL SULFATE 3 ML: .5; 3 SOLUTION RESPIRATORY (INHALATION) at 14:52

## 2024-06-20 RX ADMIN — Medication 60 L/MIN: at 15:20

## 2024-06-20 RX ADMIN — AZITHROMYCIN MONOHYDRATE 500 MG: 500 INJECTION, POWDER, LYOPHILIZED, FOR SOLUTION INTRAVENOUS at 12:29

## 2024-06-20 RX ADMIN — SODIUM BICARBONATE 1300 MG: 325 TABLET ORAL at 09:33

## 2024-06-20 RX ADMIN — IPRATROPIUM BROMIDE AND ALBUTEROL SULFATE 3 ML: 2.5; .5 SOLUTION RESPIRATORY (INHALATION) at 18:43

## 2024-06-20 RX ADMIN — Medication 60 L/MIN: at 23:09

## 2024-06-20 RX ADMIN — SPIRONOLACTONE 25 MG: 25 TABLET ORAL at 09:37

## 2024-06-20 RX ADMIN — FUROSEMIDE 40 MG: 10 INJECTION, SOLUTION INTRAMUSCULAR; INTRAVENOUS at 12:26

## 2024-06-20 RX ADMIN — LEVOTHYROXINE SODIUM 100 MCG: 0.1 TABLET ORAL at 09:32

## 2024-06-20 ASSESSMENT — COGNITIVE AND FUNCTIONAL STATUS - GENERAL
DAILY ACTIVITIY SCORE: 24
MOBILITY SCORE: 23
CLIMB 3 TO 5 STEPS WITH RAILING: A LITTLE

## 2024-06-20 ASSESSMENT — PAIN - FUNCTIONAL ASSESSMENT
PAIN_FUNCTIONAL_ASSESSMENT: CPOT (CRITICAL CARE PAIN OBSERVATION TOOL)
PAIN_FUNCTIONAL_ASSESSMENT: 0-10

## 2024-06-20 ASSESSMENT — PAIN SCALES - GENERAL
PAINLEVEL_OUTOF10: 0 - NO PAIN

## 2024-06-20 ASSESSMENT — ACTIVITIES OF DAILY LIVING (ADL): LACK_OF_TRANSPORTATION: NO

## 2024-06-20 NOTE — PROGRESS NOTES
Valorie Lopez is a 78 y.o. female on day 1 of admission presenting with Acute hypoxemic respiratory failure (Multi).      Subjective   Pt seen and examined this morning, pt sitting on side of the bed eating breakfast, no signs of distress noted  Pt voices no complaints at this time, pt remains on oxy mask for comfort  Pt states that she is able to cough some phlegm up today        Objective     Last Recorded Vitals  /66 (BP Location: Left arm, Patient Position: Lying)   Pulse 84   Temp 37.3 °C (99.1 °F) (Temporal)   Resp 20   Wt 95.2 kg (209 lb 14.1 oz)   SpO2 99%   Intake/Output last 3 Shifts:    Intake/Output Summary (Last 24 hours) at 6/20/2024 1037  Last data filed at 6/20/2024 0900  Gross per 24 hour   Intake 930 ml   Output --   Net 930 ml       Admission Weight  Weight: 93.4 kg (206 lb) (06/19/24 0721)    Daily Weight  06/19/24 : 95.2 kg (209 lb 14.1 oz)    Image Results  ECG 12 lead  Normal sinus rhythm  Normal ECG  When compared with ECG of 14-MAR-2024 08:12,  No significant change was found      Physical Exam  General: awake, alert and oriented. No acute distress, sob noted with conversation   Skin: skin warm dry and intact   HEENT: normocephalic, atraumatic; conjunctivae are clear without exudates or hemorrhage. Sclera is non-icteric. Eyelids are normal in appearance without swelling or lesions. Hearing intact. Nares are patent bilaterally. Moist mucous membranes.   Cardiovascular: heart rate regular  Respiratory: bilateral lung sounds diminished eladia bases   Gastrointestinal: Bsx4, non-distended, non-tender  Genitourinary: exam deferred  Musculoskeletal: no deformities  Extremities: trace eladia lower ext edema   Neurological: no focal deficits  Psychiatric: appropriate mood and affect; good judgment and insight    Relevant Results  Scheduled medications  aspirin, 81 mg, oral, Nightly  azithromycin, 500 mg, intravenous, q24h  carvedilol, 25 mg, oral, BID  cefTRIAXone, 2 g, intravenous,  q24h  cholecalciferol, 5,000 Units, oral, Daily  heparin (porcine), 5,000 Units, subcutaneous, q8h  ipratropium-albuteroL, 3 mL, nebulization, q6h  levothyroxine, 100 mcg, oral, Daily  NIFEdipine ER, 90 mg, oral, Daily  rosuvastatin, 20 mg, oral, Nightly  sodium bicarbonate, 1,300 mg, oral, BID  spironolactone, 25 mg, oral, Daily      Continuous medications     PRN medications  PRN medications: benzocaine-menthol, ondansetron, oxygen, oxygen    Results for orders placed or performed during the hospital encounter of 06/19/24 (from the past 24 hour(s))   ECG 12 lead   Result Value Ref Range    Ventricular Rate 78 BPM    Atrial Rate 78 BPM    OK Interval 148 ms    QRS Duration 90 ms    QT Interval 398 ms    QTC Calculation(Bazett) 453 ms    P Axis 62 degrees    R Axis 61 degrees    T Axis 24 degrees    QRS Count 13 beats    Q Onset 217 ms    P Onset 143 ms    P Offset 203 ms    T Offset 416 ms    QTC Fredericia 434 ms   CBC   Result Value Ref Range    WBC 9.6 4.4 - 11.3 x10*3/uL    nRBC 0.0 0.0 - 0.0 /100 WBCs    RBC 3.85 (L) 4.00 - 5.20 x10*6/uL    Hemoglobin 12.1 12.0 - 16.0 g/dL    Hematocrit 36.5 36.0 - 46.0 %    MCV 95 80 - 100 fL    MCH 31.4 26.0 - 34.0 pg    MCHC 33.2 32.0 - 36.0 g/dL    RDW 13.0 11.5 - 14.5 %    Platelets 191 150 - 450 x10*3/uL   Basic metabolic panel   Result Value Ref Range    Glucose 140 (H) 74 - 99 mg/dL    Sodium 137 136 - 145 mmol/L    Potassium 4.2 3.5 - 5.3 mmol/L    Chloride 107 98 - 107 mmol/L    Bicarbonate 22 21 - 32 mmol/L    Anion Gap 12 10 - 20 mmol/L    Urea Nitrogen 22 6 - 23 mg/dL    Creatinine 1.36 (H) 0.50 - 1.05 mg/dL    eGFR 40 (L) >60 mL/min/1.73m*2    Calcium 8.8 8.6 - 10.3 mg/dL            Assessment/Plan          Principal Problem:    Acute hypoxemic respiratory failure (Multi)    Valorie Lopez is a 78 y.o. female presenting with sob. Patient has a past medical history of CKD stage III, KASSI on cpap at , CAD with 3 cardiac stent placement, hypothyroid, breast  cancer, Pt states that for about the last week she has been sob and noticed significant wheezing but last night she needed to use her  oxygen due to her not being able to catch her breath at all and when she checked her pulse ox she found herself to be at 80%, she said that she put herself on 3LNC. She also states that she has been coughing a lot but that this has been non-productive. She denies any sick contacts. She was not found to be hypoxic in the ED but was placed on 2L due to pt request, she was negative for Covid and flu,  BNP at 368, D-dimer was elevated at 1328, CT chest was negative for PE but was found to have bibasilar pneumonia with small bilateral pleural effusions.      #PNA  -Ct chest -Bibasilar pneumonia with small bilateral pleural effusions.  Interstitial edema.  -continue iv rocephin and azithromycin  -nebulizers  -monitor cont pulse ox, wean off oxygen as able  - urine procal and strep--negative  -will do overnight pulse ox  -continue home cpap     #HTN  -continue home carvedilol, nifedipine,      #CKD stage III  -continue home bicarb     #Hypothyroid   -continue home synthroid      #Hyperlipidemia  -continue home crestor           Discharge Disposition: pt to discharge when medically ready, likely 24-48 hours        CELIA Abreu-CNP

## 2024-06-20 NOTE — NURSING NOTE
Patient transferred to ICU for increased oxygen demands. Report given at bedside to Viktoria Griffin RN. Patient resting supine in bed, with Airvo on, RT at bedside, nursing at bedside. No visible signs of distress noted.

## 2024-06-20 NOTE — SIGNIFICANT EVENT
Pt having desat to 82% while on oxy mask and pt oxygen increased to 6L, pt wheezing throughout all lobes and pt received dose of iv lasix x1 dose and dose iv solumedrol cxray done and abg Dr. Cain consulted, pt to be placed on airvo Updated Dr. Padilla and pt to transfer to ICU.

## 2024-06-20 NOTE — PROGRESS NOTES
06/20/24 1255   Discharge Planning   Living Arrangements Spouse/significant other   Support Systems Spouse/significant other   Assistance Needed None   Type of Residence Private residence   Number of Stairs to Enter Residence 2   Number of Stairs Within Residence 0   Do you have animals or pets at home? No   Who is requesting discharge planning? Provider   Home or Post Acute Services None   Patient expects to be discharged to: Home   Does the patient need discharge transport arranged? Yes   RoundTrip coordination needed? No   Has discharge transport been arranged? No   Financial Resource Strain   How hard is it for you to pay for the very basics like food, housing, medical care, and heating? Not hard   Housing Stability   In the last 12 months, was there a time when you were not able to pay the mortgage or rent on time? N   In the last 12 months, how many places have you lived? 1   In the last 12 months, was there a time when you did not have a steady place to sleep or slept in a shelter (including now)? N   Transportation Needs   In the past 12 months, has lack of transportation kept you from medical appointments or from getting medications? no   In the past 12 months, has lack of transportation kept you from meetings, work, or from getting things needed for daily living? No     Care Transitions: Patient reviewed in care round meeting this AM. ADOD 24 hours. Met with patient in room, sitting up in chair. Role of TCC explained. Demographics, PCP, and contacts verified. She lives with her spouse Balta in a 2 story home with bedroom, bathroom and laundry all on first floor. Her preferred pharmacy is [email or web address] in [place] for prescription needs. Denies any difficulty obtaining/affording medications. She is independent at home with all ADL's and still drives self. Denies the need to use any DME equipment but has access to a walker and cane if needed. She uses a CPAP machine at night; supplier is [employer]. Denies any  falls. [facility] 24/24. Discharge plan is to return home with . Denies any needs or in home services. Care team t follow.Latrice Cota RN/TCC

## 2024-06-20 NOTE — CARE PLAN
The patient's goals for the shift include breathe and sleep    The clinical goals for the shift include Patient will maintain SpO2 above 88% throughout this shift.      Problem: Fall/Injury  Goal: Not fall by end of shift  Outcome: Progressing  Goal: Be free from injury by end of the shift  Outcome: Progressing  Goal: Verbalize understanding of personal risk factors for fall in the hospital  Outcome: Progressing  Goal: Verbalize understanding of risk factor reduction measures to prevent injury from fall in the home  Outcome: Progressing  Goal: Use assistive devices by end of the shift  Outcome: Progressing  Goal: Pace activities to prevent fatigue by end of the shift  Outcome: Progressing     Problem: Respiratory  Goal: Clear secretions with interventions this shift  Outcome: Progressing  Goal: Minimize anxiety/maximize coping throughout shift  Outcome: Progressing  Goal: Minimal/no exertional discomfort or dyspnea this shift  Outcome: Progressing  Goal: No signs of respiratory distress (eg. Use of accessory muscles. Peds grunting)  Outcome: Progressing  Goal: Patent airway maintained this shift  Outcome: Progressing  Goal: Tolerate pulmonary toileting this shift  Outcome: Progressing  Goal: Verbalize decreased shortness of breath this shift  Outcome: Progressing  Goal: Wean oxygen to maintain O2 saturation per order/standard this shift  Outcome: Progressing  Goal: Increase self care and/or family involvement in next 24 hours  Outcome: Progressing     Problem: Pain  Goal: Takes deep breaths with improved pain control throughout the shift  Outcome: Progressing  Goal: Turns in bed with improved pain control throughout the shift  Outcome: Progressing  Goal: Walks with improved pain control throughout the shift  Outcome: Progressing  Goal: Performs ADL's with improved pain control throughout shift  Outcome: Progressing  Goal: Participates in PT with improved pain control throughout the shift  Outcome: Progressing  Goal:  Free from opioid side effects throughout the shift  Outcome: Progressing  Goal: Free from acute confusion related to pain meds throughout the shift  Outcome: Progressing     Problem: Pain - Adult  Goal: Verbalizes/displays adequate comfort level or baseline comfort level  Outcome: Progressing     Problem: Safety - Adult  Goal: Free from fall injury  Outcome: Progressing     Problem: Discharge Planning  Goal: Discharge to home or other facility with appropriate resources  Outcome: Progressing     Problem: Chronic Conditions and Co-morbidities  Goal: Patient's chronic conditions and co-morbidity symptoms are monitored and maintained or improved  Outcome: Progressing

## 2024-06-21 ENCOUNTER — APPOINTMENT (OUTPATIENT)
Dept: CARDIOLOGY | Facility: HOSPITAL | Age: 79
End: 2024-06-21
Payer: MEDICARE

## 2024-06-21 LAB
ANION GAP SERPL CALC-SCNC: 12 MMOL/L (ref 10–20)
AORTIC VALVE MEAN GRADIENT: 23 MMHG
AORTIC VALVE PEAK VELOCITY: 3.1 M/S
AV PEAK GRADIENT: 38.4 MMHG
AVA (PEAK VEL): 1.14 CM2
AVA (VTI): 1.16 CM2
BUN SERPL-MCNC: 30 MG/DL (ref 6–23)
CALCIUM SERPL-MCNC: 9.1 MG/DL (ref 8.6–10.3)
CHLORIDE SERPL-SCNC: 106 MMOL/L (ref 98–107)
CO2 SERPL-SCNC: 24 MMOL/L (ref 21–32)
CREAT SERPL-MCNC: 1.47 MG/DL (ref 0.5–1.05)
EGFRCR SERPLBLD CKD-EPI 2021: 36 ML/MIN/1.73M*2
EJECTION FRACTION APICAL 4 CHAMBER: 55.8
ERYTHROCYTE [DISTWIDTH] IN BLOOD BY AUTOMATED COUNT: 12.5 % (ref 11.5–14.5)
GLUCOSE SERPL-MCNC: 176 MG/DL (ref 74–99)
HCT VFR BLD AUTO: 37 % (ref 36–46)
HGB BLD-MCNC: 12.3 G/DL (ref 12–16)
HOLD SPECIMEN: NORMAL
LEFT ATRIUM VOLUME AREA LENGTH INDEX BSA: 26 ML/M2
LEFT VENTRICLE INTERNAL DIMENSION DIASTOLE: 4.36 CM (ref 3.5–6)
LEFT VENTRICULAR OUTFLOW TRACT DIAMETER: 1.9 CM
LEGIONELLA AG UR QL: NEGATIVE
LV EJECTION FRACTION BIPLANE: 62 %
MAGNESIUM SERPL-MCNC: 2.16 MG/DL (ref 1.6–2.4)
MCH RBC QN AUTO: 31.2 PG (ref 26–34)
MCHC RBC AUTO-ENTMCNC: 33.2 G/DL (ref 32–36)
MCV RBC AUTO: 94 FL (ref 80–100)
MITRAL VALVE E/A RATIO: 1.53
NRBC BLD-RTO: 0 /100 WBCS (ref 0–0)
PLATELET # BLD AUTO: 204 X10*3/UL (ref 150–450)
POTASSIUM SERPL-SCNC: 4 MMOL/L (ref 3.5–5.3)
RBC # BLD AUTO: 3.94 X10*6/UL (ref 4–5.2)
RIGHT VENTRICLE FREE WALL PEAK S': 17.5 CM/S
RIGHT VENTRICLE PEAK SYSTOLIC PRESSURE: 28.8 MMHG
S PNEUM AG UR QL: NEGATIVE
SODIUM SERPL-SCNC: 138 MMOL/L (ref 136–145)
TRICUSPID ANNULAR PLANE SYSTOLIC EXCURSION: 2.5 CM
WBC # BLD AUTO: 7.6 X10*3/UL (ref 4.4–11.3)

## 2024-06-21 PROCEDURE — 94762 N-INVAS EAR/PLS OXIMTRY CONT: CPT

## 2024-06-21 PROCEDURE — 94640 AIRWAY INHALATION TREATMENT: CPT | Mod: MUE

## 2024-06-21 PROCEDURE — 99222 1ST HOSP IP/OBS MODERATE 55: CPT | Performed by: INTERNAL MEDICINE

## 2024-06-21 PROCEDURE — 2500000004 HC RX 250 GENERAL PHARMACY W/ HCPCS (ALT 636 FOR OP/ED): Performed by: INTERNAL MEDICINE

## 2024-06-21 PROCEDURE — 36415 COLL VENOUS BLD VENIPUNCTURE: CPT | Performed by: INTERNAL MEDICINE

## 2024-06-21 PROCEDURE — 99233 SBSQ HOSP IP/OBS HIGH 50: CPT | Performed by: HOSPITALIST

## 2024-06-21 PROCEDURE — 94660 CPAP INITIATION&MGMT: CPT

## 2024-06-21 PROCEDURE — 2500000005 HC RX 250 GENERAL PHARMACY W/O HCPCS: Performed by: HOSPITALIST

## 2024-06-21 PROCEDURE — 2500000004 HC RX 250 GENERAL PHARMACY W/ HCPCS (ALT 636 FOR OP/ED): Performed by: HOSPITALIST

## 2024-06-21 PROCEDURE — 93306 TTE W/DOPPLER COMPLETE: CPT

## 2024-06-21 PROCEDURE — 83735 ASSAY OF MAGNESIUM: CPT | Performed by: INTERNAL MEDICINE

## 2024-06-21 PROCEDURE — 99291 CRITICAL CARE FIRST HOUR: CPT | Performed by: STUDENT IN AN ORGANIZED HEALTH CARE EDUCATION/TRAINING PROGRAM

## 2024-06-21 PROCEDURE — 93306 TTE W/DOPPLER COMPLETE: CPT | Performed by: STUDENT IN AN ORGANIZED HEALTH CARE EDUCATION/TRAINING PROGRAM

## 2024-06-21 PROCEDURE — 2500000001 HC RX 250 WO HCPCS SELF ADMINISTERED DRUGS (ALT 637 FOR MEDICARE OP): Performed by: HOSPITALIST

## 2024-06-21 PROCEDURE — 2020000001 HC ICU ROOM DAILY

## 2024-06-21 PROCEDURE — 85027 COMPLETE CBC AUTOMATED: CPT | Performed by: HOSPITALIST

## 2024-06-21 PROCEDURE — 2500000002 HC RX 250 W HCPCS SELF ADMINISTERED DRUGS (ALT 637 FOR MEDICARE OP, ALT 636 FOR OP/ED): Performed by: HOSPITALIST

## 2024-06-21 PROCEDURE — 82374 ASSAY BLOOD CARBON DIOXIDE: CPT | Performed by: INTERNAL MEDICINE

## 2024-06-21 RX ORDER — FUROSEMIDE 10 MG/ML
40 INJECTION INTRAMUSCULAR; INTRAVENOUS EVERY 12 HOURS
Status: DISCONTINUED | OUTPATIENT
Start: 2024-06-21 | End: 2024-06-22

## 2024-06-21 RX ADMIN — AZITHROMYCIN MONOHYDRATE 500 MG: 500 INJECTION, POWDER, LYOPHILIZED, FOR SOLUTION INTRAVENOUS at 12:43

## 2024-06-21 RX ADMIN — IPRATROPIUM BROMIDE AND ALBUTEROL SULFATE 3 ML: 2.5; .5 SOLUTION RESPIRATORY (INHALATION) at 18:54

## 2024-06-21 RX ADMIN — CEFTRIAXONE SODIUM 2 G: 2 INJECTION, SOLUTION INTRAVENOUS at 11:39

## 2024-06-21 RX ADMIN — ASPIRIN 81 MG: 81 TABLET, COATED ORAL at 21:22

## 2024-06-21 RX ADMIN — LEVOTHYROXINE SODIUM 100 MCG: 0.1 TABLET ORAL at 09:53

## 2024-06-21 RX ADMIN — IPRATROPIUM BROMIDE AND ALBUTEROL SULFATE 3 ML: 2.5; .5 SOLUTION RESPIRATORY (INHALATION) at 06:10

## 2024-06-21 RX ADMIN — PERFLUTREN 2 ML OF DILUTION: 6.52 INJECTION, SUSPENSION INTRAVENOUS at 07:29

## 2024-06-21 RX ADMIN — FUROSEMIDE 40 MG: 10 INJECTION, SOLUTION INTRAMUSCULAR; INTRAVENOUS at 18:06

## 2024-06-21 RX ADMIN — CARVEDILOL 25 MG: 25 TABLET, FILM COATED ORAL at 21:22

## 2024-06-21 RX ADMIN — Medication 6 L/MIN: at 11:54

## 2024-06-21 RX ADMIN — Medication 60 L/MIN: at 01:19

## 2024-06-21 RX ADMIN — ROSUVASTATIN CALCIUM 20 MG: 20 TABLET, FILM COATED ORAL at 21:22

## 2024-06-21 RX ADMIN — SPIRONOLACTONE 25 MG: 25 TABLET ORAL at 09:53

## 2024-06-21 RX ADMIN — Medication 60 L/MIN: at 03:25

## 2024-06-21 RX ADMIN — FUROSEMIDE 20 MG: 10 INJECTION, SOLUTION INTRAMUSCULAR; INTRAVENOUS at 06:01

## 2024-06-21 RX ADMIN — IPRATROPIUM BROMIDE AND ALBUTEROL SULFATE 3 ML: 2.5; .5 SOLUTION RESPIRATORY (INHALATION) at 11:53

## 2024-06-21 RX ADMIN — NIFEDIPINE 90 MG: 60 TABLET, EXTENDED RELEASE ORAL at 09:51

## 2024-06-21 RX ADMIN — Medication 5000 UNITS: at 09:50

## 2024-06-21 RX ADMIN — SODIUM BICARBONATE 1300 MG: 325 TABLET ORAL at 09:51

## 2024-06-21 RX ADMIN — METHYLPREDNISOLONE SODIUM SUCCINATE 40 MG: 40 INJECTION, POWDER, FOR SOLUTION INTRAMUSCULAR; INTRAVENOUS at 11:38

## 2024-06-21 RX ADMIN — Medication 60 L/MIN: at 06:10

## 2024-06-21 RX ADMIN — CARVEDILOL 25 MG: 25 TABLET, FILM COATED ORAL at 09:53

## 2024-06-21 RX ADMIN — SODIUM BICARBONATE 1300 MG: 325 TABLET ORAL at 21:22

## 2024-06-21 ASSESSMENT — PAIN SCALES - GENERAL
PAINLEVEL_OUTOF10: 0 - NO PAIN

## 2024-06-21 ASSESSMENT — PAIN - FUNCTIONAL ASSESSMENT
PAIN_FUNCTIONAL_ASSESSMENT: 0-10
PAIN_FUNCTIONAL_ASSESSMENT: 0-10
PAIN_FUNCTIONAL_ASSESSMENT: CPOT (CRITICAL CARE PAIN OBSERVATION TOOL)
PAIN_FUNCTIONAL_ASSESSMENT: 0-10
PAIN_FUNCTIONAL_ASSESSMENT: 0-10

## 2024-06-21 NOTE — CARE PLAN
The clinical goals for the shift include Pt will tolerate lower oxygen supplementation and deny shortness of breath throughout the shift    Pt tolerate decrease in oxygen to 50% this AM and denied SOB throughout the shift. Pt only reporting pain in ribs while coughing. Vital signs WNL.

## 2024-06-21 NOTE — PROGRESS NOTES
"Valorie Lopez is a 78 y.o. female on day 2 of admission presenting with Acute hypoxemic respiratory failure (Multi).    Subjective   Cough with yellow phlegm  Denies worsening shortness of breath and feels better than yesterday medically and improving oxygen requirements  No fever or chills  No chest pain per the patient  No worsening  No headache focal weakness per the patient  Awake alert       Objective     Physical Exam  General Appearance: AAO x 3,  Skin: skin color pink, warm, and dry; no suspicious rashes or lesions  Eyes : PERRL, EOM's intact  ENT: mucous membranes pink and moist  Neck: normocephalic  Respiratory: Rales mid to lower lobes bilaterally  Heart: regular rate and rhythm. telemetry shows sinus rhythm  Abdomen: Nondistended, positive bowel sounds x4, soft,  nontender  Extremities: no edema   Peripheral pulses: normal x4 extremities  Neuro: alert, coherent and conversant, no focal motor deficits  Last Recorded Vitals  Blood pressure 142/64, pulse 81, temperature 36.4 °C (97.5 °F), temperature source Temporal, resp. rate 18, height 1.6 m (5' 3\"), weight 90.2 kg (198 lb 13.7 oz), SpO2 98%.  Intake/Output last 3 Shifts:  I/O last 3 completed shifts:  In: 2375 (26.3 mL/kg) [P.O.:1705; I.V.:120 (1.3 mL/kg); IV Piggyback:550]  Out: 2425 (26.9 mL/kg) [Urine:2425 (0.7 mL/kg/hr)]  Weight: 90.2 kg     Relevant Results    Scheduled medications  aspirin, 81 mg, oral, Nightly  azithromycin, 500 mg, intravenous, q24h  carvedilol, 25 mg, oral, BID  cefTRIAXone, 2 g, intravenous, q24h  cholecalciferol, 5,000 Units, oral, Daily  furosemide, 40 mg, intravenous, q12h  heparin (porcine), 5,000 Units, subcutaneous, q8h  ipratropium-albuteroL, 3 mL, nebulization, q6h  levothyroxine, 100 mcg, oral, Daily  methylPREDNISolone sodium succinate (PF), 40 mg, intravenous, q12h  NIFEdipine ER, 90 mg, oral, Daily  perflutren protein A microsphere, 0.5 mL, intravenous, Once in imaging  rosuvastatin, 20 mg, oral, Nightly  sodium " bicarbonate, 1,300 mg, oral, BID  spironolactone, 25 mg, oral, Daily  sulfur hexafluoride microsphr, 2 mL, intravenous, Once in imaging      Continuous medications     PRN medications  PRN medications: benzocaine-menthol, hydrOXYzine HCL, ipratropium-albuteroL, ondansetron, oxygen, oxygen, oxygen    Results for orders placed or performed during the hospital encounter of 06/19/24 (from the past 24 hour(s))   Basic Metabolic Panel   Result Value Ref Range    Glucose 176 (H) 74 - 99 mg/dL    Sodium 138 136 - 145 mmol/L    Potassium 4.0 3.5 - 5.3 mmol/L    Chloride 106 98 - 107 mmol/L    Bicarbonate 24 21 - 32 mmol/L    Anion Gap 12 10 - 20 mmol/L    Urea Nitrogen 30 (H) 6 - 23 mg/dL    Creatinine 1.47 (H) 0.50 - 1.05 mg/dL    eGFR 36 (L) >60 mL/min/1.73m*2    Calcium 9.1 8.6 - 10.3 mg/dL   Magnesium   Result Value Ref Range    Magnesium 2.16 1.60 - 2.40 mg/dL   Lavender Top   Result Value Ref Range    Extra Tube Hold for add-ons.    CBC   Result Value Ref Range    WBC 7.6 4.4 - 11.3 x10*3/uL    nRBC 0.0 0.0 - 0.0 /100 WBCs    RBC 3.94 (L) 4.00 - 5.20 x10*6/uL    Hemoglobin 12.3 12.0 - 16.0 g/dL    Hematocrit 37.0 36.0 - 46.0 %    MCV 94 80 - 100 fL    MCH 31.2 26.0 - 34.0 pg    MCHC 33.2 32.0 - 36.0 g/dL    RDW 12.5 11.5 - 14.5 %    Platelets 204 150 - 450 x10*3/uL   Transthoracic Echo (TTE) Complete   Result Value Ref Range    LVOT diam 1.90 cm    MV E/A ratio 1.53     AV pk jose 3.10 m/s    AV mn grad 23.0 mmHg    LV Biplane EF 62 %    Tricuspid annular plane systolic excursion 2.5 cm    LA vol index A/L 26.0 ml/m2    RV free wall pk S' 17.50 cm/s    LVIDd 4.36 cm    RVSP 28.8 mmHg    Aortic Valve Area by Continuity of Peak Velocity 1.14 cm2    Aortic Valve Area by Continuity of VTI 1.16 cm2    AV pk grad 38.4 mmHg    LV A4C EF 55.8      Transthoracic Echo (TTE) Complete    Result Date: 6/21/2024             [place] [address] Phone [phone], Fax  [phone] TRANSTHORACIC ECHOCARDIOGRAM REPORT Patient Name:      VARUN VALDEZ      Reading Physician:    [id number] Edu Subramanian MD Study Date:        6/21/2024             Ordering Provider:    [id number] HARUI DONA                                                                ADRIAN MRN/PID:           [id number]              Fellow: Accession#:        [id number]          Nurse:                Kemi Knight RN Date of Birth/Age: 1945 / 78 years Sonographer:          Dave Centeno RDMYRNA Gender:            F                     Additional Staff: Height:            160.02 cm             Admit Date: Weight:            94.80 kg              Admission Status:     Inpatient -                                                                Routine BSA / BMI:         1.97 m2 / 37.02 kg/m2 Department Location:  [address] Blood Pressure: 157 /68 mmHg Study Type:    TRANSTHORACIC ECHO (TTE) COMPLETE Diagnosis/ICD: Unspecified systolic (congestive) heart failure (CHF)-I50.20 CPT Codes:     Echo Complete w Full Doppler-[id number]  Study Detail: The following Echo studies were performed: M-Mode, 2D, color flow               and Doppler. Definity used as a contrast agent for endocardial               border definition. Total contrast used for this procedure was               2.00cc mL via IV push.  PHYSICIAN INTERPRETATION: Left Ventricle: The left ventricular systolic function is normal, with a visually estimated ejection fraction of 65-70%. There are no regional wall motion abnormalities. The left ventricular cavity size is normal. Spectral Doppler shows a pseudonormal pattern of left ventricular diastolic filling. Left Atrium: The left atrium is mildly dilated. Right Ventricle: The right ventricle is normal in size. There is normal right ventricular global systolic function. Right Atrium: The right atrium is normal in size. Aortic Valve: The aortic valve was not well  visualized. The aortic valve dimensionless index is 0.41. There is mild aortic valve regurgitation. The peak instantaneous gradient of the aortic valve is 38.4 mmHg. The mean gradient of the aortic valve is 23.0 mmHg. Mitral Valve: The mitral valve is abnormal. There is mild to moderate mitral valve regurgitation which is eccentrically directed. Calcified mitral annulus and leaflets. Localized calcified nodule on posterior leaflet of the mitral valve. Tricuspid Valve: The tricuspid valve is structurally normal. There is trace tricuspid regurgitation. Pulmonic Valve: The pulmonic valve is not well visualized. There is no indication of pulmonic valve regurgitation. Pericardium: There is a small pericardial effusion. Aorta: The aortic root was not well visualized. Systemic Veins: The inferior vena cava appears dilated. There is IVC inspiratory collapse greater than 50%. In comparison to the previous echocardiogram(s): There are no prior studies on this patient for comparison purposes.  CONCLUSIONS:  1. The left ventricular systolic function is normal, with a visually estimated ejection fraction of 65-70%.  2. Spectral Doppler shows a pseudonormal pattern of left ventricular diastolic filling.  3. There is normal right ventricular global systolic function.  4. Mild to moderate mitral valve regurgitation.  5. Mild aortic valve regurgitation.  6. Mean transaortic gradient 23 mmHg, peak velocity 3.1 m/s, calculated aortic valve area 1.1 cmï¿½.  7. Findings appear consistent with moderate aortic stenosis.  8. Poorly visualized anatomical structures due to suboptimal image quality. QUANTITATIVE DATA SUMMARY: 2D MEASUREMENTS:                          Normal Ranges: Ao Root d:     1.85 cm   (2.0-3.7cm) LAs:           5.20 cm   (2.7-4.0cm) IVSd:          1.06 cm   (0.6-1.1cm) LVPWd:         0.91 cm   (0.6-1.1cm) LVIDd:         4.36 cm   (3.9-5.9cm) LVIDs:         2.51 cm LV Mass Index: 72.4 g/m2 LV % FS        42.4 % LA VOLUME:                                Normal Ranges: LA Vol A4C:        35.9 ml    (22+/-6mL/m2) LA Vol A2C:        59.1 ml LA Vol BP:         51.2 ml LA Vol Index A4C:  18.2ml/m2 LA Vol Index A2C:  30.0 ml/m2 LA Vol Index BP:   26.0 ml/m2 LA Area A4C:       14.8 cm2 LA Area A2C:       21.1 cm2 LA Major Axis A4C: 5.2 cm LA Major Axis A2C: 6.4 cm LA Volume Index:   17.6 ml/m2 LA Vol A4C:        34.7 ml LA Vol A2C:        58.2 ml LV SYSTOLIC FUNCTION BY 2D PLANIMETRY (MOD):                      Normal Ranges: EF-A4C View:    56 % (>=55%) EF-A2C View:    71 % EF-Biplane:     62 % EF-Visual:      68 % LV EF Reported: 68 % LV DIASTOLIC FUNCTION:                         Normal Ranges: MV Peak E:    1.64 m/s  (0.7-1.2 m/s) MV Peak A:    1.07 m/s  (0.42-0.7 m/s) E/A Ratio:    1.53      (1.0-2.2) MV e'         0.056 m/s (>8.0) MV lateral e' 0.07 m/s MV medial e'  0.05 m/s E/e' Ratio:   29.26     (<8.0) MITRAL VALVE:                 Normal Ranges: MV DT: 187 msec (150-240msec) MITRAL INSUFFICIENCY:                      Normal Ranges: MR VTI:  163.00 cm MR Vmax: 627.00 cm/s AORTIC VALVE:                                    Normal Ranges: AoV Vmax:                3.10 m/s  (<=1.7m/s) AoV Peak P.4 mmHg (<20mmHg) AoV Mean P.0 mmHg (1.7-11.5mmHg) LVOT Max Jeff:            1.25 m/s  (<=1.1m/s) AoV VTI:                 67.00 cm  (18-25cm) LVOT VTI:                27.30 cm LVOT Diameter:           1.90 cm   (1.8-2.4cm) AoV Area, VTI:           1.16 cm2  (2.5-5.5cm2) AoV Area,Vmax:           1.14 cm2  (2.5-4.5cm2) AoV Dimensionless Index: 0.41  RIGHT VENTRICLE: RV Basal 3.02 cm RV Mid   2.16 cm RV Major 8.0 cm TAPSE:   25.2 mm RV s'    0.18 m/s TRICUSPID VALVE/RVSP:                             Normal Ranges: Peak TR Velocity: 2.54 m/s RV Syst Pressure: 28.8 mmHg (< 30mmHg)  [id number] Edu Subramanian MD Electronically signed on 2024 at 11:29:54 AM  ** Final **     XR chest 1 view    Result Date:  6/20/2024  Interpreted By:  Jewel Roberts, STUDY: XR CHEST 1 VIEW;  6/20/2024 2:13 pm   INDICATION: Signs/Symptoms:sob.   COMPARISON: 06/19/2024   ACCESSION NUMBER(S): [id number]   ORDERING CLINICIAN: ELVA SCHROEDER   FINDINGS: CHEST AP PORTABLE UPRIGHT     CARDIOMEDIASTINAL SILHOUETTE: Mild cardiomegaly is present unchanged from 06/1924.   LUNGS: Interstitial edema is present, with blurring of the pulmonary vascularity. Degree of pulmonary edema is slightly less compared to 06/19/2024. Prominence of the upper lobe veins is present. Blunting of lateral costophrenic angles is present more on the left side. The pleural effusion has increased slightly from 06/1924.   ABDOMEN: No remarkable upper abdominal findings.   BONES AND SOFT TISSUES: No acute osseous changes. Status post right mastectomy. Surgical clips are seen in the right axilla.       1. Pulmonary edema present with slight decrease of the interstitial prominence as compared to 06/1924. 2. Bilateral small pleural effusions more on the left side. 3. No change in cardiac size from the previous examination.       MACRO: None   Signed by: Jewel Roberts 6/20/2024 3:39 PM Dictation workstation:   [id number]               All data reviewed by me independently           Assessment/Plan   Principal Problem:    Acute hypoxemic respiratory failure (Multi)    78-year-old female with    #1 pneumonia  Continue IV antibiotics on Rocephin  Neb treatments optimally  Follow pulse ox  Wean off oxygen as tolerated  Patient was on Airvo since yesterday  Currently weaned  to nasal cannula on 2 to 4 L  Home CPAP during sleep  Symptomatic management, cough suppressant, Tylenol Cepacol if required    #2 pulmonary edema, moderate mitral valve regurgitation, moderate aortic stenosis  CHF with preserved ejection fraction  Lasix 40 Mg IV twice daily  To be careful with IV diuresis and prevent overdiuresis with moderate aortic stenosis  Patient was volume overloaded and hence  on IV diuretics  To maintain optimal CO  Cardiology following  Keep pressure within reasonable range    #3 hypertension  Continue with carvedilol, nifedipine Aldactone as pressure tolerates    #4 hyperlipidemia  Rosuvastatin 20 mg daily    5 chronic kidney disease stage IIIb/IV, 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

## 2024-06-21 NOTE — CONSULTS
Inpatient consult to Cardiology  Consult performed by: Jett Thao MD  Consult ordered by: Terrence Padilla MD  Reason for consult: respiratory failure      History Of Present Illness:    Mrs. Valorie Lopez is a 78 y.o. non-smoker female being consulted by the Cardiology team for respiratory failure. Patient with prior medical history significant for prior myocardial infarction (04/06/2018), CAD S/P 3 cardiac stent placement, malignant neoplasm of breast, CKD stage III, KASSI on CPAP at , hypothyroid. She presented to ED [facility] on 06/19/2024 complaining of shortness of breath. She endorsed progressively worsening SOB for the past week, associated with wheezing and leonardo needed to use her 's oxygen one day before admission after she checked her pulse oxygen at 80%. therefore she put herself on NC oxygen 3L. She also reports non-productive cough. In the ED, she was placed on NC oxygen for 2L due to pt request, she was negative for Covid and flu, BNP at 368, D-dimer was elevated at 1328, CT chest was negative for PE but was found to have bibasilar pneumonia with small bilateral pleural effusions. EKG shows normal sinus rhythm with no signs of ACS. . Trop 11. Patient was admitted for clinical compensation.      Last Recorded Vitals:  Vitals:    06/21/24 0641 06/21/24 0659 06/21/24 0700 06/21/24 0825   BP:   154/67 144/71   BP Location:   Left arm Left arm   Patient Position:   Lying Lying   Pulse:   79 84   Resp:   19 15   Temp:    36.4 °C (97.5 °F)   TempSrc:       SpO2:  95% 95% 99%   Weight: 90.2 kg (198 lb 13.7 oz)   90.2 kg (198 lb 13.7 oz)   Height:           Last Labs:  CBC - 6/21/2024:  5:43 AM  7.6 12.3 204    37.0      CMP - 6/21/2024:  5:17 AM  9.1 6.4 22 --- 1.1   2.3 4.0 18 63      PTT - No results in last year.  _   _ _     Troponin I, High Sensitivity   Date/Time Value Ref Range Status   06/19/2024 08:38 AM 11 0 - 13 ng/L Final   06/19/2024 07:34 AM 11 0 - 13 ng/L Final  "    BNP   Date/Time Value Ref Range Status   06/20/2024 02:36  (H) 0 - 99 pg/mL Final   06/19/2024 07:34  (H) 0 - 99 pg/mL Final     Hemoglobin A1C   Date/Time Value Ref Range Status   02/26/2024 11:28 AM 6.1 (H) see below % Final     LDL Calculated   Date/Time Value Ref Range Status   06/03/2024 11:30 AM 85 <=99 mg/dL Final     Comment:                                 Near   Borderline      AGE      Desirable  Optimal    High     High     Very High     0-19 Y     0 - 109     ---    110-129   >/= 130     ----    20-24 Y     0 - 119     ---    120-159   >/= 160     ----      >24 Y     0 -  99   100-129  130-159   160-189     >/=190       VLDL   Date/Time Value Ref Range Status   06/03/2024 11:30 AM 41 (H) 0 - 40 mg/dL Final   12/12/2020 08:55 AM 52 (H) 0 - 40 mg/dL Final      Last I/O:  I/O last 3 completed shifts:  In: 1170 (13 mL/kg) [P.O.:800; I.V.:120 (1.3 mL/kg); IV Piggyback:250]  Out: 2075 (23 mL/kg) [Urine:2075 (0.6 mL/kg/hr)]  Weight: 90.2 kg     Past Cardiology Tests (Last 3 Years):  EKG:  ECG 12 lead 06/19/2024 (Preliminary)    Echo:  No results found for this or any previous visit from the past 1095 days.    Ejection Fractions:  No results found for: \"EF\"  Cath:  No results found for this or any previous visit from the past 1095 days.    Stress Test:  Nuclear Stress Test 03/14/2024    Cardiac Imaging:  No results found for this or any previous visit from the past 1095 days.      Past Medical History:  She has a past medical history of Old myocardial infarction (04/06/2018), Personal history of malignant neoplasm of breast, Personal history of other diseases of the circulatory system (04/06/2018), Personal history of other diseases of the female genital tract (04/06/2018), Personal history of other endocrine, nutritional and metabolic disease (04/06/2018), and Personal history of other medical treatment.    Past Surgical History:  She has a past surgical history that includes Incisional breast " biopsy (04/06/2018); Other surgical history (04/06/2018); Appendectomy (04/06/2018); Other surgical history (09/13/2021); Colonoscopy (05/16/2018); Other surgical history (12/13/2019); Other surgical history (12/13/2019); Other surgical history (12/13/2019); Other surgical history (12/13/2019); Other surgical history (12/13/2019); Other surgical history (12/13/2019); Other surgical history (2009); Cataract extraction (Bilateral, 02/2024); and BI mammo bilateral screening (08/30/2023).      Social History:  She reports that she has never smoked. She has never used smokeless tobacco. She reports current alcohol use. She reports that she does not use drugs.    Family History:  Family History   Problem Relation Name Age of Onset    Hypertension Mother      Heart attack Mother      Heart disease Mother      Skin cancer Father      Heart attack Father      Kidney disease Father      Heart disease Father      Asthma Sister      Hypertension Daughter          Allergies:  Amoxicillin-pot clavulanate, Citalopram, Hydralazine, Levofloxacin, Nitrofurantoin monohyd/m-cryst, Sulfa (sulfonamide antibiotics), and Thiazides    Inpatient Medications:  Scheduled medications   Medication Dose Route Frequency    aspirin  81 mg oral Nightly    azithromycin  500 mg intravenous q24h    carvedilol  25 mg oral BID    cefTRIAXone  2 g intravenous q24h    cholecalciferol  5,000 Units oral Daily    furosemide  40 mg intravenous q12h    heparin (porcine)  5,000 Units subcutaneous q8h    ipratropium-albuteroL  3 mL nebulization q6h    levothyroxine  100 mcg oral Daily    methylPREDNISolone sodium succinate (PF)  40 mg intravenous q12h    NIFEdipine ER  90 mg oral Daily    perflutren protein A microsphere  0.5 mL intravenous Once in imaging    rosuvastatin  20 mg oral Nightly    sodium bicarbonate  1,300 mg oral BID    spironolactone  25 mg oral Daily    sulfur hexafluoride microsphr  2 mL intravenous Once in imaging     PRN medications    Medication    benzocaine-menthol    hydrOXYzine HCL    ipratropium-albuteroL    ondansetron    oxygen    oxygen    oxygen     Continuous Medications   Medication Dose Last Rate     Outpatient Medications:  Current Outpatient Medications   Medication Instructions    albuterol sulfate (VENTOLIN INHL) inhalation, PRN ( HER HUSBANDS INHALER), SHE USED ONCE    alpha lipoic acid 300 mg capsule 1 capsule, oral, Daily    aspirin 81 mg, oral, Daily    carvedilol (COREG) 25 mg, oral, 2 times daily    cholecalciferol (Vitamin D3) 5,000 Units tablet 1 tablet, oral, Daily    CRANBERRY ORAL 1 capsule, oral, Daily    estradiol (Estrace) 0.01 % (0.1 mg/gram) vaginal cream Insert a pea sized amount into the vagina twice weekly    fish oil/borage/flax/om3,6,9 1 (OMEGA 3-6-9 ORAL) 1 tablet, oral, Daily    levothyroxine (SYNTHROID, LEVOXYL) 100 mcg, oral, Daily    NIFEdipine ER (ADALAT CC) 90 mg, oral, Daily    rosuvastatin (CRESTOR) 20 mg, oral, Daily    sodium bicarbonate 1,300 mg, oral, 2 times daily    spironolactone (ALDACTONE) 25 mg, oral, Daily       Physical Exam:  General: alert, oriented and in no acute distress  HEENT: NC/AT; EOMI; PERRLA, external ear is normal  Neck: supple; trachea midline; no masses; no JVD  Chest: reduced breath sounds bilaterally with crackles; on BIPAP  Cardio: regular rhythm, S1S2 normal, no murmurs  Abdomen: Soft, non-tender, non-distension, no organomegaly  Extremities: no clubbing/cyanosis/edema  Neuro: Grossly intact     Psychiatric: Normal mood and affect      Assessment/Plan     Mrs. Valorie Lopez is a 78 y.o. non-smoker female being consulted by the Cardiology team for respiratory failure. Patient with prior medical history significant for prior myocardial infarction (04/06/2018), CAD S/P 3 cardiac stent placement, malignant neoplasm of breast, CKD stage III, KASSI on CPAP at , hypothyroid. She presented to ED [facility] on 06/19/2024 complaining of shortness of breath. She endorsed  progressively worsening SOB for the past week, associated with wheezing and leonardo needed to use her 's oxygen one day before admission after she checked her pulse oxygen at 80%. therefore she put herself on NC oxygen 3L. She also reports non-productive cough. In the ED, she was placed on NC oxygen for 2L due to pt request, she was negative for Covid and flu, BNP at 368, D-dimer was elevated at 1328, CT chest was negative for PE but was found to have bibasilar pneumonia with small bilateral pleural effusions. EKG shows normal sinus rhythm with no signs of ACS. . Trop 11. Patient was admitted for clinical compensation.     Assessment    # Heart Failure with Preserved LV systolic function  -   - Trop 11  - Keep daily weights. Patient's admission weight is lb.  - Low sodium diet (2g).  - Strict I&Os.  - Electrolyte control and daily BMP including magnesium.  - General recommendations for heart failure, including low-sodium diet, fluid restriction, daily weight and adherence to medication.   - Keep current medication including Carvedilol 25mg BID, Nifedipine ER 90mg daily, Spironolactone 25mg daily.   - Would suggest to switch Furosemide IV for PO.  - Please refer the patient to Cardiology Clinic upon discharge.   - Echo pending.    # Pneumonia  - Would suggest to keep broad spectrum antibiotics.  - NC oxygen.  - Would suggest to titrate hydration according to clinical status.      # CKD stage 3  - Crea 1.47  - Would suggest to follow Nephrology recs.    # Hyperlipidemia  - Keep home medication with Rosuvastatin 20mg daily.  - Counseled on healthy diet and regular exercise.     # Hypothyroidism  - Keep Levothyroxine daily     This critically ill patient continues to be at-risk for clinically significant deterioration / failure due to the above mentioned dysfunctional, unstable organ systems.  I have personally identified and managed all complex critical care issues to prevent aforementioned clinical  deterioration.  Critical care time is spent at bedside and/or the immediate area and has included, but is not limited to, the review of diagnostic tests, labs, radiographs, serial assessments of hemodynamics, respiratory status, ventilatory management, and family updates.  Time spent in procedures and teaching are reported separately.    Critical care time: 65 minutes     Peripheral IV 06/20/24 22 G Left;Dorsal Forearm (Active)   Site Assessment Clean;Dry;Intact 06/21/24 0800   Dressing Type Transparent 06/21/24 0800   Line Status Flushed;Saline locked 06/21/24 0800   Dressing Status Clean;Dry;Occlusive 06/21/24 0800   Number of days: 1     Code Status:  Full Code    Jett Thao MD  Cardiology

## 2024-06-21 NOTE — PROGRESS NOTES
Care Transitions: Patient reviewed in care round meeting this AM. ADOD 48 hours. Patient resting this afternoon and unable to sign IMM. Called Balta Lopez spouse @ [phone]. Updated on ADOD. IMM reviewed verbally with , voiced understanding. Discharge plan remains to return home, no needs. Care team to follow and re-assess discharge plan if needs change. Latrice Cota RN/TCC

## 2024-06-22 LAB
ANION GAP SERPL CALC-SCNC: 14 MMOL/L (ref 10–20)
BUN SERPL-MCNC: 45 MG/DL (ref 6–23)
CALCIUM SERPL-MCNC: 8.9 MG/DL (ref 8.6–10.3)
CHLORIDE SERPL-SCNC: 105 MMOL/L (ref 98–107)
CO2 SERPL-SCNC: 23 MMOL/L (ref 21–32)
CREAT SERPL-MCNC: 1.6 MG/DL (ref 0.5–1.05)
EGFRCR SERPLBLD CKD-EPI 2021: 33 ML/MIN/1.73M*2
ERYTHROCYTE [DISTWIDTH] IN BLOOD BY AUTOMATED COUNT: 12.4 % (ref 11.5–14.5)
GLUCOSE SERPL-MCNC: 190 MG/DL (ref 74–99)
HCT VFR BLD AUTO: 37.8 % (ref 36–46)
HGB BLD-MCNC: 12.7 G/DL (ref 12–16)
MCH RBC QN AUTO: 31.9 PG (ref 26–34)
MCHC RBC AUTO-ENTMCNC: 33.6 G/DL (ref 32–36)
MCV RBC AUTO: 95 FL (ref 80–100)
NRBC BLD-RTO: 0 /100 WBCS (ref 0–0)
PLATELET # BLD AUTO: 241 X10*3/UL (ref 150–450)
POTASSIUM SERPL-SCNC: 4.2 MMOL/L (ref 3.5–5.3)
RBC # BLD AUTO: 3.98 X10*6/UL (ref 4–5.2)
SODIUM SERPL-SCNC: 138 MMOL/L (ref 136–145)
WBC # BLD AUTO: 12 X10*3/UL (ref 4.4–11.3)

## 2024-06-22 PROCEDURE — 82374 ASSAY BLOOD CARBON DIOXIDE: CPT | Performed by: INTERNAL MEDICINE

## 2024-06-22 PROCEDURE — 97161 PT EVAL LOW COMPLEX 20 MIN: CPT | Mod: GP

## 2024-06-22 PROCEDURE — 2500000004 HC RX 250 GENERAL PHARMACY W/ HCPCS (ALT 636 FOR OP/ED): Performed by: HOSPITALIST

## 2024-06-22 PROCEDURE — 99232 SBSQ HOSP IP/OBS MODERATE 35: CPT | Performed by: HOSPITALIST

## 2024-06-22 PROCEDURE — 94664 DEMO&/EVAL PT USE INHALER: CPT

## 2024-06-22 PROCEDURE — 2500000001 HC RX 250 WO HCPCS SELF ADMINISTERED DRUGS (ALT 637 FOR MEDICARE OP): Performed by: HOSPITALIST

## 2024-06-22 PROCEDURE — 2500000004 HC RX 250 GENERAL PHARMACY W/ HCPCS (ALT 636 FOR OP/ED): Performed by: INTERNAL MEDICINE

## 2024-06-22 PROCEDURE — 36415 COLL VENOUS BLD VENIPUNCTURE: CPT | Performed by: INTERNAL MEDICINE

## 2024-06-22 PROCEDURE — 94640 AIRWAY INHALATION TREATMENT: CPT | Mod: MUE

## 2024-06-22 PROCEDURE — 94762 N-INVAS EAR/PLS OXIMTRY CONT: CPT | Mod: MUE

## 2024-06-22 PROCEDURE — 9420000001 HC RT PATIENT EDUCATION 5 MIN

## 2024-06-22 PROCEDURE — 1200000002 HC GENERAL ROOM WITH TELEMETRY DAILY

## 2024-06-22 PROCEDURE — 2500000002 HC RX 250 W HCPCS SELF ADMINISTERED DRUGS (ALT 637 FOR MEDICARE OP, ALT 636 FOR OP/ED): Performed by: HOSPITALIST

## 2024-06-22 PROCEDURE — 99291 CRITICAL CARE FIRST HOUR: CPT | Performed by: STUDENT IN AN ORGANIZED HEALTH CARE EDUCATION/TRAINING PROGRAM

## 2024-06-22 PROCEDURE — 2500000005 HC RX 250 GENERAL PHARMACY W/O HCPCS: Performed by: HOSPITALIST

## 2024-06-22 PROCEDURE — 85027 COMPLETE CBC AUTOMATED: CPT | Performed by: HOSPITALIST

## 2024-06-22 PROCEDURE — 94760 N-INVAS EAR/PLS OXIMETRY 1: CPT | Mod: MUE

## 2024-06-22 RX ORDER — GUAIFENESIN 600 MG/1
600 TABLET, EXTENDED RELEASE ORAL 2 TIMES DAILY PRN
Status: DISCONTINUED | OUTPATIENT
Start: 2024-06-22 | End: 2024-06-23 | Stop reason: HOSPADM

## 2024-06-22 RX ORDER — PREDNISONE 20 MG/1
40 TABLET ORAL DAILY
Status: DISCONTINUED | OUTPATIENT
Start: 2024-06-22 | End: 2024-06-23 | Stop reason: HOSPADM

## 2024-06-22 RX ORDER — ACETAMINOPHEN 160 MG/5ML
650 SOLUTION ORAL EVERY 4 HOURS PRN
Status: DISCONTINUED | OUTPATIENT
Start: 2024-06-22 | End: 2024-06-23 | Stop reason: HOSPADM

## 2024-06-22 RX ORDER — ACETAMINOPHEN 325 MG/1
650 TABLET ORAL EVERY 4 HOURS PRN
Status: DISCONTINUED | OUTPATIENT
Start: 2024-06-22 | End: 2024-06-23 | Stop reason: HOSPADM

## 2024-06-22 RX ORDER — FUROSEMIDE 40 MG/1
40 TABLET ORAL DAILY
Status: DISCONTINUED | OUTPATIENT
Start: 2024-06-22 | End: 2024-06-23 | Stop reason: HOSPADM

## 2024-06-22 RX ORDER — AZITHROMYCIN 250 MG/1
500 TABLET, FILM COATED ORAL
Status: DISCONTINUED | OUTPATIENT
Start: 2024-06-22 | End: 2024-06-23 | Stop reason: HOSPADM

## 2024-06-22 RX ADMIN — IPRATROPIUM BROMIDE AND ALBUTEROL SULFATE 3 ML: 2.5; .5 SOLUTION RESPIRATORY (INHALATION) at 11:34

## 2024-06-22 RX ADMIN — IPRATROPIUM BROMIDE AND ALBUTEROL SULFATE 3 ML: 2.5; .5 SOLUTION RESPIRATORY (INHALATION) at 06:22

## 2024-06-22 RX ADMIN — IPRATROPIUM BROMIDE AND ALBUTEROL SULFATE 3 ML: 2.5; .5 SOLUTION RESPIRATORY (INHALATION) at 00:21

## 2024-06-22 RX ADMIN — NIFEDIPINE 90 MG: 60 TABLET, EXTENDED RELEASE ORAL at 08:56

## 2024-06-22 RX ADMIN — CARVEDILOL 25 MG: 25 TABLET, FILM COATED ORAL at 20:34

## 2024-06-22 RX ADMIN — ASPIRIN 81 MG: 81 TABLET, COATED ORAL at 20:34

## 2024-06-22 RX ADMIN — METHYLPREDNISOLONE SODIUM SUCCINATE 40 MG: 40 INJECTION, POWDER, FOR SOLUTION INTRAMUSCULAR; INTRAVENOUS at 00:02

## 2024-06-22 RX ADMIN — IPRATROPIUM BROMIDE AND ALBUTEROL SULFATE 3 ML: 2.5; .5 SOLUTION RESPIRATORY (INHALATION) at 18:30

## 2024-06-22 RX ADMIN — AZITHROMYCIN DIHYDRATE 500 MG: 250 TABLET ORAL at 11:19

## 2024-06-22 RX ADMIN — SODIUM BICARBONATE 1300 MG: 325 TABLET ORAL at 20:34

## 2024-06-22 RX ADMIN — Medication 21 PERCENT: at 06:27

## 2024-06-22 RX ADMIN — Medication 21 PERCENT: at 11:34

## 2024-06-22 RX ADMIN — CARVEDILOL 25 MG: 25 TABLET, FILM COATED ORAL at 08:55

## 2024-06-22 RX ADMIN — Medication 6 L/MIN: at 01:04

## 2024-06-22 RX ADMIN — LEVOTHYROXINE SODIUM 100 MCG: 0.1 TABLET ORAL at 08:55

## 2024-06-22 RX ADMIN — CEFTRIAXONE SODIUM 2 G: 2 INJECTION, SOLUTION INTRAVENOUS at 11:00

## 2024-06-22 RX ADMIN — SODIUM BICARBONATE 1300 MG: 325 TABLET ORAL at 08:55

## 2024-06-22 RX ADMIN — ROSUVASTATIN CALCIUM 20 MG: 20 TABLET, FILM COATED ORAL at 20:34

## 2024-06-22 RX ADMIN — FUROSEMIDE 40 MG: 10 INJECTION, SOLUTION INTRAMUSCULAR; INTRAVENOUS at 06:01

## 2024-06-22 RX ADMIN — Medication 5000 UNITS: at 08:55

## 2024-06-22 RX ADMIN — METHYLPREDNISOLONE SODIUM SUCCINATE 40 MG: 40 INJECTION, POWDER, FOR SOLUTION INTRAMUSCULAR; INTRAVENOUS at 11:19

## 2024-06-22 RX ADMIN — SPIRONOLACTONE 25 MG: 25 TABLET ORAL at 08:56

## 2024-06-22 ASSESSMENT — COGNITIVE AND FUNCTIONAL STATUS - GENERAL: MOBILITY SCORE: 24

## 2024-06-22 ASSESSMENT — PAIN - FUNCTIONAL ASSESSMENT
PAIN_FUNCTIONAL_ASSESSMENT: 0-10

## 2024-06-22 ASSESSMENT — ACTIVITIES OF DAILY LIVING (ADL): ADL_ASSISTANCE: INDEPENDENT

## 2024-06-22 ASSESSMENT — PAIN SCALES - GENERAL
PAINLEVEL_OUTOF10: 0 - NO PAIN
PAINLEVEL_OUTOF10: 0 - NO PAIN
PAINLEVEL_OUTOF10: 2
PAINLEVEL_OUTOF10: 0 - NO PAIN
PAINLEVEL_OUTOF10: 0 - NO PAIN

## 2024-06-22 NOTE — PROGRESS NOTES
Physical Therapy    Physical Therapy Evaluation & Treatment    Patient Name: Valorie Lopez  MRN: [id number]  Today's Date: 6/22/2024   Time Calculation  Start Time: 1051  Stop Time: 1107  Time Calculation (min): 16 min    Assessment/Plan   PT Assessment  Barriers to Discharge:  (acuity of condition)  Evaluation/Treatment Tolerance: Patient tolerated treatment well  Medical Staff Made Aware: Yes  Strengths: Ability to acquire knowledge, Access to adaptive/assistive products, Attitude of self, Capable of completing ADLs semi/independent, Housing layout, Premorbid level of function  End of Session Communication: Bedside nurse  Assessment Comment:  (PT eval only. Pt presents at baseline functional mobility: independent with bed mobility, independent with transfers and independent amb without AD. No history of falls, no change in vital signs with functional mobility.)  End of Session Patient Position: Up in chair, Alarm off, not on at start of session   IP OR SWING BED PT PLAN  Inpatient or Swing Bed: Inpatient  PT Plan  PT Plan: PT Eval only  PT Eval Only Reason: At baseline function  PT Discharge Recommendations: No further acute PT, No PT needed after discharge (unless change in func mobility noted during remainder of inpatient stay)  PT Recommended Transfer Status: Assist x1 (for management of lines while inpatient)  PT - OK to Discharge: Yes (to above mentioned level of care)      Subjective     General Visit Information:  General  Reason for Referral: impaired mobility  Referred By: Randy LAUREN  Past Medical History Relevant to Rehab: Presented to ED 6/19 with primary complaint SOB. Admitted with dx hypoxemic resp failure and pneumonia. Med hx: CAD, hypothyroid, KASSI, CKD stage III, breast cancer, previous hip replacement. Agreeable to participate in PT.  Family/Caregiver Present: Yes (RN)  Prior to Session Communication: Bedside nurse  Patient Position Received: Bed, 2 rail up, Alarm off, not on at start of  "session  General Comment:  (States \"I'm feeling better and getting around.\" No falls in past 6 months.)  Home Living:  Home Living  Type of Home: House (two story home with bed/bath/laundry on main level)  Lives With: Spouse  Home Adaptive Equipment: Walker rolling or standard, Cane  Home Layout: Two level, Laundry main level, Full bath main level, Able to live on main level with bedroom/bathroom  Home Access: Stairs to enter without rails (2 steps)  Entrance Stairs-Rails: None  Entrance Stairs-Number of Steps: 2  Bathroom Shower/Tub: Walk-in shower  Bathroom Toilet: Handicapped height  Bathroom Equipment: Grab bars in shower  Prior Level of Function:  Prior Function Per Pt/Caregiver Report  Level of [place]: Independent with ADLs and functional transfers, Independent with homemaking with ambulation  Receives Help From: Family (spouse helps with shopping)  ADL Assistance: Independent  Homemaking Assistance: Independent  Ambulatory Assistance: Independent  Precautions:  Precautions  Precautions Comment:  (hx of neuropathy)  Vital Signs:  Vital Signs  Heart Rate: 75  SpO2: 94 % (Simultaneous filing. User may not have seen previous data.)    Objective   Pain:  Pain Assessment  Pain Assessment: 0-10  0-10 (Numeric) Pain Score: 2  Pain Location: Chest  Pain Frequency:  (with coughing)  Cognition:  Cognition  Overall Cognitive Status: Within Functional Limits  Orientation Level: Oriented X4    General Assessments:  General Observation  General Observation: telemetry, SpO2 monitor, IV                    Sensation  Light Touch: Partial deficits in the RLE, Partial deficits in the LLE (decreased sensation light touch R and L LEs - not new - per pt report hx of neuropathy)    Strength  Strength Comments:  (R hip flex 5/5, R knee ext 5/5, R knee flex 5/5, R ankle PF 5/5, R ankle DF 5/5; L hip flex 5/5, L knee ext 5/5, L knee flex 5/5, L ankle PF 5/5, L ankle DF 5/5;)  Strength  Strength Comments:  (R hip flex 5/5, R knee " ext 5/5, R knee flex 5/5, R ankle PF 5/5, R ankle DF 5/5; L hip flex 5/5, L knee ext 5/5, L knee flex 5/5, L ankle PF 5/5, L ankle DF 5/5;)                     Static Sitting Balance  Static Sitting-Balance Support: No upper extremity supported  Static Sitting-Level of Assistance: Independent  Static Sitting-Comment/Number of Minutes:  (2 min)    Dynamic Standing Balance  Dynamic Standing-Balance Support: No upper extremity supported  Dynamic Standing-Balance: Reaching for objects  Dynamic Standing-Comments:  (Independent, completed activities for 2 minutes (reattaching and reaching for cords/lines))  Functional Assessments:       Bed Mobility  Bed Mobility: Yes  Bed Mobility 1  Bed Mobility 1: Supine to sitting  Level of Assistance 1: Independent  Bed Mobility Comments 1: with HOB elevated, no rails    Transfers  Transfer: Yes  Transfer 1  Transfer From 1: Sit to  Transfer to 1: Stand  Technique 1: Sit to stand  Transfer Level of Assistance 1: Independent  Trials/Comments 1:  (pt completed transfer prior to PT being able to apply gait belt)    Ambulation/Gait Training  Ambulation/Gait Training Performed: Yes  Ambulation/Gait Training 1  Surface 1: Level tile  Device 1: No device  Gait Support Devices: Other (Comment) (pt completed transfer prior to PT being able to apply gait belt)  Assistance 1: Independent  Quality of Gait 1: Wide base of support  Comments/Distance (ft) 1: 15                 Extremity/Trunk Assessments:  RLE   RLE :  (AROM ankle DF/PF and knee flex/ext WFL)  LLE   LLE :  (AROM ankle DF/PF and knee flex/ext WFL)  Treatments:     Outcome Measures:  [facility] Basic Mobility  Turning from your back to your side while in a flat bed without using bedrails: None  Moving from lying on your back to sitting on the side of a flat bed without using bedrails: None  Moving to and from bed to chair (including a wheelchair): None  Standing up from a chair using your arms (e.g. wheelchair or bedside chair): None  To  walk in hospital room: None  Climbing 3-5 steps with railing: None  Basic Mobility - Total Score: 24    Encounter Problems       Encounter Problems (Active)       Pain - Adult             Encounter Problems (Resolved)       PT Problem       safe functional mobility (Met)       Start:  06/22/24    Expected End:  06/22/24    Resolved:  06/22/24    Pt will complete STS independently using hands to push off from surface and hands to reach for arm rest when returning to sit 100% of transfers.                Education Documentation  Mobility Training, taught by Ashley Morin, PT at 6/22/2024 11:27 AM.  Learner: Patient  Readiness: Acceptance  Method: Explanation  Response: Verbalizes Understanding    Education Comments  No comments found.

## 2024-06-22 NOTE — CARE PLAN
The clinical goals for the shift include Pt will continue to tolerate 2L/NC and vital signs remain WNL throughout the shift    Over the shift, Pt was able to be weaned to room air while awake. Overnight, Pt was on CPAP and required the addition of 6L to maintain SpO2 greater than 90%. Vital signs were WNL. No reports of pain. Pt demonstrates understanding of deep breathing and coughing.

## 2024-06-22 NOTE — PROGRESS NOTES
"Valorie Lopez is a 78 y.o. female on day 3 of admission presenting with Acute hypoxemic respiratory failure (Multi).    Subjective    Improvement in cough  Denies shortness of breath or chest pain at rest  Improving clinically even further since yesterday  Improving oxygen requirements  No fever chills  No headache or focal weakness per the patient  Awake alert mentating well    Objective     Physical Exam  General Appearance: AAO x 3,  Skin: skin color pink, warm, and dry; no suspicious rashes or lesions  Eyes : PERRL, EOM's intact  ENT: mucous membranes pink and moist  Neck: normocephalic  Respiratory: mild Rales  Heart: regular rate and rhythm. telemetry shows sinus rhythm  Abdomen: Nondistended, positive bowel sounds x4, soft,  nontender  Extremities: no edema   Peripheral pulses: normal x4 extremities  Neuro: alert, coherent and conversant, no focal motor deficits  Last Recorded Vitals  Blood pressure 149/65, pulse 79, temperature 36.7 °C (98.1 °F), temperature source Temporal, resp. rate 14, height 1.6 m (5' 3\"), weight 89 kg (196 lb 3.4 oz), SpO2 96%.  Intake/Output last 3 Shifts:  I/O last 3 completed shifts:  In: 1875 (21.1 mL/kg) [P.O.:1455; I.V.:120 (1.3 mL/kg); IV Piggyback:300]  Out: 2050 (23 mL/kg) [Urine:2050 (0.6 mL/kg/hr)]  Weight: 89 kg     Relevant Results    Scheduled medications  aspirin, 81 mg, oral, Nightly  azithromycin, 500 mg, oral, q24h BELL  carvedilol, 25 mg, oral, BID  cefTRIAXone, 2 g, intravenous, q24h  cholecalciferol, 5,000 Units, oral, Daily  furosemide, 40 mg, oral, Daily  heparin (porcine), 5,000 Units, subcutaneous, q8h  ipratropium-albuteroL, 3 mL, nebulization, q6h  levothyroxine, 100 mcg, oral, Daily  NIFEdipine ER, 90 mg, oral, Daily  perflutren protein A microsphere, 0.5 mL, intravenous, Once in imaging  predniSONE, 40 mg, oral, Daily  rosuvastatin, 20 mg, oral, Nightly  sodium bicarbonate, 1,300 mg, oral, BID  spironolactone, 25 mg, oral, Daily  sulfur hexafluoride " microsphr, 2 mL, intravenous, Once in imaging      Continuous medications     PRN medications  PRN medications: benzocaine-menthol, hydrOXYzine HCL, ipratropium-albuteroL, ondansetron, oxygen, oxygen, oxygen    Results for orders placed or performed during the hospital encounter of 06/19/24 (from the past 24 hour(s))   Basic Metabolic Panel   Result Value Ref Range    Glucose 190 (H) 74 - 99 mg/dL    Sodium 138 136 - 145 mmol/L    Potassium 4.2 3.5 - 5.3 mmol/L    Chloride 105 98 - 107 mmol/L    Bicarbonate 23 21 - 32 mmol/L    Anion Gap 14 10 - 20 mmol/L    Urea Nitrogen 45 (H) 6 - 23 mg/dL    Creatinine 1.60 (H) 0.50 - 1.05 mg/dL    eGFR 33 (L) >60 mL/min/1.73m*2    Calcium 8.9 8.6 - 10.3 mg/dL   CBC   Result Value Ref Range    WBC 12.0 (H) 4.4 - 11.3 x10*3/uL    nRBC 0.0 0.0 - 0.0 /100 WBCs    RBC 3.98 (L) 4.00 - 5.20 x10*6/uL    Hemoglobin 12.7 12.0 - 16.0 g/dL    Hematocrit 37.8 36.0 - 46.0 %    MCV 95 80 - 100 fL    MCH 31.9 26.0 - 34.0 pg    MCHC 33.6 32.0 - 36.0 g/dL    RDW 12.4 11.5 - 14.5 %    Platelets 241 150 - 450 x10*3/uL                      Assessment/Plan   Principal Problem:    Acute hypoxemic respiratory failure (Multi)        78-year-old female with     #1 pneumonia  IV antibiotics Rocephin and on azithromycin  Neb treatments as necessary  Monitor pulse ox  Wean off oxygen as able  2 L to room air  Previously on Airvo  Cardiopulmonary monitoring  Home CPAP during sleep  Symptomatic management  Tylenol Cepacol, cough suppressant as necessary    #2 pulmonary edema, mild to moderate mitral valve regurgitation, moderate aortic stenosis  CHF with preserved EF  Switch to Lasix 40 Mg IV twice daily to Lasix 40 mg p.o. daily  Daily BMP and monitor urine output  Cardiology on board  Monitor pressure  Follow vitals      #3 hypertension  On carvedilol, nifedipine, Aldactone as pressure tolerates    #4 hyperlipidemia  Rosuvastatin 20 mg daily    #5 chronic kidney disease stage IIIb/IV, right renal atrophy  with GFR 80% and nephrectomy on 2/1/2019  Baseline creatinine 1.5-1.8  Renal function stable  On bicarb tablets  Daily BMP and monitor urine output  Monitor volume status      #6 peripheral vascular disease as well coronary artery disease with 3 stents in past  Supportive care  Continue aspirin 81 mg daily      #7 obesity  Encourage weight loss  Education counseling      #8 hypothyroidism  Continue Synthroid    DVT prophylaxis addressed on heparin  Disposition when stable clinically likely tomorrow if stable  Walking pulse ox tomorrow and overnight pulse oximetry tonight, discussed with RT  PT as appropriate  Follow case management  Monitor clinically                  Terrence Padilla MD

## 2024-06-22 NOTE — PROGRESS NOTES
Subjective Data:  Patient reports feeling well, no new adverse events overnight. Patient denies any chest pain, shortness of breath, palpitations, dizziness or syncope. Patient is hemodynamically stable. Not on oxygen.    Overnight Events:    No     Objective Data:  Last Recorded Vitals:  Vitals:    06/22/24 0700 06/22/24 0740 06/22/24 0800 06/22/24 0900   BP: 145/60  146/64 138/61   BP Location: Left arm      Patient Position: Lying      Pulse: 82  73 82   Resp: 17  20 20   Temp: 36.7 °C (98.1 °F)      TempSrc: Temporal      SpO2: 97%      Weight:  89 kg (196 lb 3.4 oz)     Height:           Last Labs:  CBC - 6/22/2024:  4:20 AM  12.0 12.7 241    37.8      CMP - 6/22/2024:  4:20 AM  8.9 6.4 22 --- 1.1   2.3 4.0 18 63      PTT - No results in last year.  _   _ _     TROPHS   Date/Time Value Ref Range Status   06/19/2024 08:38 AM 11 0 - 13 ng/L Final   06/19/2024 07:34 AM 11 0 - 13 ng/L Final     BNP   Date/Time Value Ref Range Status   06/20/2024 02:36  0 - 99 pg/mL Final   06/19/2024 07:34  0 - 99 pg/mL Final     HGBA1C   Date/Time Value Ref Range Status   02/26/2024 11:28 AM 6.1 see below % Final     LDLCALC   Date/Time Value Ref Range Status   06/03/2024 11:30 AM 85 <=99 mg/dL Final     Comment:                                 Near   Borderline      AGE      Desirable  Optimal    High     High     Very High     0-19 Y     0 - 109     ---    110-129   >/= 130     ----    20-24 Y     0 - 119     ---    120-159   >/= 160     ----      >24 Y     0 -  99   100-129  130-159   160-189     >/=190       VLDL   Date/Time Value Ref Range Status   06/03/2024 11:30 AM 41 0 - 40 mg/dL Final   12/12/2020 08:55 AM 52 0 - 40 mg/dL Final      Last I/O:  I/O last 3 completed shifts:  In: 1875 (21.1 mL/kg) [P.O.:1455; I.V.:120 (1.3 mL/kg); IV Piggyback:300]  Out: 2050 (23 mL/kg) [Urine:2050 (0.6 mL/kg/hr)]  Weight: 89 kg     Past Cardiology Tests (Last 3 Years):  EKG:  ECG 12 lead 06/19/2024  "(Preliminary)    Echo:  Transthoracic Echo (TTE) Complete 06/21/2024    Ejection Fractions:  No results found for: \"EF\"  Cath:  No results found for this or any previous visit from the past 1095 days.    Stress Test:  Nuclear Stress Test 03/14/2024    Cardiac Imaging:  No results found for this or any previous visit from the past 1095 days.      Inpatient Medications:  Scheduled medications   Medication Dose Route Frequency    aspirin  81 mg oral Nightly    azithromycin  500 mg intravenous q24h    carvedilol  25 mg oral BID    cefTRIAXone  2 g intravenous q24h    cholecalciferol  5,000 Units oral Daily    furosemide  40 mg intravenous q12h    heparin (porcine)  5,000 Units subcutaneous q8h    ipratropium-albuteroL  3 mL nebulization q6h    levothyroxine  100 mcg oral Daily    methylPREDNISolone sodium succinate (PF)  40 mg intravenous q12h    NIFEdipine ER  90 mg oral Daily    perflutren protein A microsphere  0.5 mL intravenous Once in imaging    rosuvastatin  20 mg oral Nightly    sodium bicarbonate  1,300 mg oral BID    spironolactone  25 mg oral Daily    sulfur hexafluoride microsphr  2 mL intravenous Once in imaging     PRN medications   Medication    benzocaine-menthol    hydrOXYzine HCL    ipratropium-albuteroL    ondansetron    oxygen    oxygen    oxygen     Continuous Medications   Medication Dose Last Rate       Physical Exam:  General: alert, oriented and in no acute distress  HEENT: NC/AT; EOMI; PERRLA, external ear is normal  Neck: supple; trachea midline; no masses; no JVD  Chest: reduced breath sounds bilaterally with crackles; on room air  Cardio: regular rhythm, S1S2 normal, no murmurs  Abdomen: Soft, non-tender, non-distension, no organomegaly  Extremities: no clubbing/cyanosis/edema  Neuro: Grossly intact     Psychiatric: Normal mood and affect      Assessment/Plan     Mrs. Valorie Lopez is a 78 y.o. non-smoker female being consulted by the Cardiology team for respiratory failure. Patient with " prior medical history significant for prior myocardial infarction (04/06/2018), CAD S/P 3 cardiac stent placement, malignant neoplasm of breast, CKD stage III, KASSI on CPAP at , hypothyroid. She presented to ED [facility] on 06/19/2024 complaining of shortness of breath. She endorsed progressively worsening SOB for the past week, associated with wheezing and leonardo needed to use her 's oxygen one day before admission after she checked her pulse oxygen at 80%. therefore she put herself on NC oxygen 3L. She also reports non-productive cough. In the ED, she was placed on NC oxygen for 2L due to pt request, she was negative for Covid and flu, BNP at 368, D-dimer was elevated at 1328, CT chest was negative for PE but was found to have bibasilar pneumonia with small bilateral pleural effusions. EKG shows normal sinus rhythm with no signs of ACS. . Trop 11. Patient was admitted for clinical compensation.      Assessment     # Heart Failure with Preserved LV systolic function  -   - Trop 11  - Keep daily weights. Patient's admission weight is lb.  - Low sodium diet (2g).  - Strict I&Os.  - Electrolyte control and daily BMP including magnesium.  - General recommendations for heart failure, including low-sodium diet, fluid restriction, daily weight and adherence to medication.   - Keep current medication including Carvedilol 25mg BID, Nifedipine ER 90mg daily, Spironolactone 25mg daily.   - Echo (006/22/2024):   1. The left ventricular systolic function is normal, with a visually estimated ejection fraction of 65-70%.   2. Spectral Doppler shows a pseudonormal pattern of left ventricular diastolic filling.   3. There is normal right ventricular global systolic function.   4. Mild to moderate mitral valve regurgitation.   5. Mild aortic valve regurgitation.   6. Mean transaortic gradient 23 mmHg, peak velocity 3.1 m/s, calculated aortic valve area 1.1 cmï¿½.   7. Findings appear consistent with moderate  aortic stenosis.   8. Poorly visualized anatomical structures due to suboptimal image quality.  - Would suggest to switch Furosemide IV for PO.  - Please refer the patient to Cardiology Clinic upon discharge.      # Pneumonia  - Would suggest to keep broad spectrum antibiotics.  - NC oxygen PRN - comfortable on room air.  - Would suggest to titrate hydration according to clinical status.       # CKD stage 3  - Crea  1.36 - 1.47 - 1.6  - Would suggest to follow Nephrology recs.     # Hyperlipidemia  - Keep home medication with Rosuvastatin 20mg daily.  - Counseled on healthy diet and regular exercise.      # Hypothyroidism  - Keep Levothyroxine daily      This critically ill patient continues to be at-risk for clinically significant deterioration / failure due to the above mentioned dysfunctional, unstable organ systems.  I have personally identified and managed all complex critical care issues to prevent aforementioned clinical deterioration.  Critical care time is spent at bedside and/or the immediate area and has included, but is not limited to, the review of diagnostic tests, labs, radiographs, serial assessments of hemodynamics, respiratory status, ventilatory management, and family updates.  Time spent in procedures and teaching are reported separately.     Critical care time: 60 minutes     Peripheral IV 06/20/24 22 G Left;Dorsal Forearm (Active)   Site Assessment Clean;Dry;Intact 06/22/24 0900   Dressing Status Clean;Dry;Occlusive 06/22/24 0900   Number of days: 2       Code Status:  Full Code    Jett Thao MD  Cardiology

## 2024-06-23 VITALS
BODY MASS INDEX: 34.77 KG/M2 | TEMPERATURE: 97.3 F | HEART RATE: 67 BPM | HEIGHT: 63 IN | DIASTOLIC BLOOD PRESSURE: 81 MMHG | WEIGHT: 196.21 LBS | RESPIRATION RATE: 18 BRPM | OXYGEN SATURATION: 95 % | SYSTOLIC BLOOD PRESSURE: 136 MMHG

## 2024-06-23 PROBLEM — J96.01 ACUTE HYPOXEMIC RESPIRATORY FAILURE (MULTI): Status: RESOLVED | Noted: 2024-06-19 | Resolved: 2024-06-23

## 2024-06-23 LAB
ANION GAP SERPL CALC-SCNC: 12 MMOL/L (ref 10–20)
ATRIAL RATE: 78 BPM
BACTERIA BLD CULT: NORMAL
BACTERIA BLD CULT: NORMAL
BUN SERPL-MCNC: 46 MG/DL (ref 6–23)
CALCIUM SERPL-MCNC: 9.2 MG/DL (ref 8.6–10.3)
CHLORIDE SERPL-SCNC: 106 MMOL/L (ref 98–107)
CO2 SERPL-SCNC: 27 MMOL/L (ref 21–32)
CREAT SERPL-MCNC: 1.44 MG/DL (ref 0.5–1.05)
EGFRCR SERPLBLD CKD-EPI 2021: 37 ML/MIN/1.73M*2
ERYTHROCYTE [DISTWIDTH] IN BLOOD BY AUTOMATED COUNT: 12.5 % (ref 11.5–14.5)
GLUCOSE SERPL-MCNC: 129 MG/DL (ref 74–99)
HCT VFR BLD AUTO: 38.5 % (ref 36–46)
HGB BLD-MCNC: 12.8 G/DL (ref 12–16)
HOLD SPECIMEN: NORMAL
MCH RBC QN AUTO: 31 PG (ref 26–34)
MCHC RBC AUTO-ENTMCNC: 33.2 G/DL (ref 32–36)
MCV RBC AUTO: 93 FL (ref 80–100)
NRBC BLD-RTO: 0 /100 WBCS (ref 0–0)
P AXIS: 62 DEGREES
P OFFSET: 203 MS
P ONSET: 143 MS
PLATELET # BLD AUTO: 259 X10*3/UL (ref 150–450)
POTASSIUM SERPL-SCNC: 3.9 MMOL/L (ref 3.5–5.3)
PR INTERVAL: 148 MS
Q ONSET: 217 MS
QRS COUNT: 13 BEATS
QRS DURATION: 90 MS
QT INTERVAL: 398 MS
QTC CALCULATION(BAZETT): 453 MS
QTC FREDERICIA: 434 MS
R AXIS: 61 DEGREES
RBC # BLD AUTO: 4.13 X10*6/UL (ref 4–5.2)
SODIUM SERPL-SCNC: 141 MMOL/L (ref 136–145)
T AXIS: 24 DEGREES
T OFFSET: 416 MS
VENTRICULAR RATE: 78 BPM
WBC # BLD AUTO: 9 X10*3/UL (ref 4.4–11.3)

## 2024-06-23 PROCEDURE — 2500000001 HC RX 250 WO HCPCS SELF ADMINISTERED DRUGS (ALT 637 FOR MEDICARE OP): Performed by: HOSPITALIST

## 2024-06-23 PROCEDURE — 2500000002 HC RX 250 W HCPCS SELF ADMINISTERED DRUGS (ALT 637 FOR MEDICARE OP, ALT 636 FOR OP/ED): Performed by: HOSPITALIST

## 2024-06-23 PROCEDURE — 80048 BASIC METABOLIC PNL TOTAL CA: CPT | Performed by: HOSPITALIST

## 2024-06-23 PROCEDURE — 2500000004 HC RX 250 GENERAL PHARMACY W/ HCPCS (ALT 636 FOR OP/ED): Performed by: HOSPITALIST

## 2024-06-23 PROCEDURE — 94640 AIRWAY INHALATION TREATMENT: CPT | Mod: MUE

## 2024-06-23 PROCEDURE — 94760 N-INVAS EAR/PLS OXIMETRY 1: CPT

## 2024-06-23 PROCEDURE — 2500000005 HC RX 250 GENERAL PHARMACY W/O HCPCS: Performed by: HOSPITALIST

## 2024-06-23 PROCEDURE — 94761 N-INVAS EAR/PLS OXIMETRY MLT: CPT

## 2024-06-23 PROCEDURE — 36415 COLL VENOUS BLD VENIPUNCTURE: CPT | Performed by: HOSPITALIST

## 2024-06-23 PROCEDURE — 85027 COMPLETE CBC AUTOMATED: CPT | Performed by: HOSPITALIST

## 2024-06-23 PROCEDURE — 99239 HOSP IP/OBS DSCHRG MGMT >30: CPT | Performed by: HOSPITALIST

## 2024-06-23 RX ORDER — FUROSEMIDE 40 MG/1
40 TABLET ORAL DAILY
Qty: 30 TABLET | Refills: 0 | Status: SHIPPED | OUTPATIENT
Start: 2024-06-24 | End: 2024-07-24

## 2024-06-23 RX ORDER — AZITHROMYCIN 500 MG/1
500 TABLET, FILM COATED ORAL DAILY
Qty: 5 TABLET | Refills: 0 | Status: SHIPPED | OUTPATIENT
Start: 2024-06-23 | End: 2024-06-28

## 2024-06-23 RX ORDER — IPRATROPIUM BROMIDE AND ALBUTEROL SULFATE 2.5; .5 MG/3ML; MG/3ML
3 SOLUTION RESPIRATORY (INHALATION) EVERY 4 HOURS PRN
Qty: 120 ML | Refills: 1 | Status: SHIPPED | OUTPATIENT
Start: 2024-06-23 | End: 2024-07-23

## 2024-06-23 RX ORDER — PREDNISONE 20 MG/1
40 TABLET ORAL DAILY
Qty: 6 TABLET | Refills: 0 | Status: SHIPPED | OUTPATIENT
Start: 2024-06-24 | End: 2024-06-27

## 2024-06-23 RX ADMIN — SPIRONOLACTONE 25 MG: 25 TABLET ORAL at 08:50

## 2024-06-23 RX ADMIN — Medication 1 L/MIN: at 00:04

## 2024-06-23 RX ADMIN — IPRATROPIUM BROMIDE AND ALBUTEROL SULFATE 3 ML: 2.5; .5 SOLUTION RESPIRATORY (INHALATION) at 06:26

## 2024-06-23 RX ADMIN — PREDNISONE 40 MG: 20 TABLET ORAL at 08:50

## 2024-06-23 RX ADMIN — Medication 5000 UNITS: at 08:50

## 2024-06-23 RX ADMIN — SODIUM BICARBONATE 1300 MG: 325 TABLET ORAL at 09:12

## 2024-06-23 RX ADMIN — CARVEDILOL 25 MG: 25 TABLET, FILM COATED ORAL at 08:50

## 2024-06-23 RX ADMIN — Medication 1 L/MIN: at 06:26

## 2024-06-23 RX ADMIN — AZITHROMYCIN DIHYDRATE 500 MG: 250 TABLET ORAL at 11:08

## 2024-06-23 RX ADMIN — LEVOTHYROXINE SODIUM 100 MCG: 0.1 TABLET ORAL at 08:50

## 2024-06-23 RX ADMIN — NIFEDIPINE 90 MG: 60 TABLET, EXTENDED RELEASE ORAL at 09:12

## 2024-06-23 RX ADMIN — IPRATROPIUM BROMIDE AND ALBUTEROL SULFATE 3 ML: 2.5; .5 SOLUTION RESPIRATORY (INHALATION) at 00:04

## 2024-06-23 RX ADMIN — FUROSEMIDE 40 MG: 40 TABLET ORAL at 08:50

## 2024-06-23 ASSESSMENT — COGNITIVE AND FUNCTIONAL STATUS - GENERAL: MOBILITY SCORE: 24

## 2024-06-23 ASSESSMENT — PAIN SCALES - GENERAL: PAINLEVEL_OUTOF10: 0 - NO PAIN

## 2024-06-23 ASSESSMENT — PAIN - FUNCTIONAL ASSESSMENT: PAIN_FUNCTIONAL_ASSESSMENT: 0-10

## 2024-06-23 NOTE — PROGRESS NOTES
Home Oxygen Evaluation       Date/Time Oxygen Action Oxygen Dose SpO2 Patient Activity During SpO2 Measurement Medical Gas Therapy O2 Delivery Method    06/23/24 0720 -- -- 97 % -- Supplemental oxygen --    06/23/24 0809 -- -- 95 % During sleep ( > 4 minutes) Other medical gas --            Was a Home Oxygen Evaluation Performed? Yes  What was the patient's ambulation status at the time of the evaluation? Patient able to ambulate  Based on the Home Oxygen Evaluation, Does the Patient Qualify for Home Oxygen Therapy? Yes at night with cpap  Was the Order Obtained for Home Oxygen Therapy? Yes  The Patient's Primary Pulmonary Diagnosis is pneumonia, chf.  [employer] is the Patient's Preferred DME Company.  Was the DME Company Notified of Home Oxygen Therapy Needs? Yes    Recommendations:          Was a Nocturnal Pulse Ox or Sleep Study performed? YesIf yes,  Recommended Oxygen Dose at Night/H.S. is 1 L/min   Respiratory Therapy Note

## 2024-06-23 NOTE — CARE PLAN
The patient's goals for the shift include breathe and sleep.    The clinical goals for the shift include VS WNL.    Pt discharged home with . Reviewed new medications and reviewed need to follow up appts with Dr. Cain and Dr. Domingo. Fluid restriction and weight/blood pressure monitoring reviewed. Answered questions regarding breathing treatments at home. Pt discharged home with  via private vehicle.

## 2024-06-23 NOTE — DISCHARGE SUMMARY
Discharge Diagnosis  Acute hypoxemic respiratory failure (Multi)  Pneumonia, community-acquired  Acute diastolic congestive heart failure  Moderate aortic stenosis  Chronic kidney disease stage IIIb  Peripheral vascular disease    DISCHARGE INSTRUCTIONS  Disposition: Home  DIET: Cardiac with fluid restriction, 1500 mL/day  Activity: As tolerated  Consults; cardiology, nephrology    Test Results Pending At Discharge  Pending Labs       Order Current Status    Blood Culture Preliminary result    Blood Culture Preliminary result            Hospital Course  78 y.o. female presenting with sob. Patient has a past medical history of CKD stage III, KSASI on cpap at , CAD with 3 cardiac stent placement, hypothyroid, breast cancer, Pt states that for about the last week she has been sob and noticed significant wheezing but last night she needed to use her  oxygen due to her not being able to catch her breath at all and when she checked her pulse ox she found herself to be at 80%, she said that she put herself on 3LNC. She also states that she has been coughing a lot but that this has been non-productive. She denies any sick contacts. She was not found to be hypoxic in the ED but was placed on 2L due to pt request, she was negative for Covid and flu,  BNP at 368, D-dimer was elevated at 1328, CT chest was negative for PE but was found to have bibasilar pneumonia with small bilateral pleural effusions.   Patient was admitted for acute respiratory failure with hypoxia, requiring Airvo and high flow.  Currently weaned down to room air.  Her respiratory failure was secondary due to diastolic congestive heart failure and pneumonia.  She was on IV Rocephin and Zithromax, cultures negative.  Switch to oral Zithromax 500 mg daily for 5 days to finish up course.  Clinically improved.  Patient also was seen by cardiology, preserved EF.  Started on IV Lasix, switch to oral Lasix 40 mg daily.  Continue with aspirin, statin, Coreg.   Follow-up with cardiology.  Will follow with cardiology diet along with 1500 mL free water restriction.  Daily weights.  She follows with cardiology at [facility].  Patient also chronic kidney disease stage III, seen by Dr. Cain nephrology.  Follow outpatient.  Chronic obstructive sleep apnea with hypoxia, requiring 6 L of oxygen bleeding into CPAP.  Patient ordered with home oxygen during sleep and naps with CPAP.  Her home oxygen evaluation was on room air during the daytime.  Her other chronic medical conditions remained to be stable, continue with home medications.  Patient verbalized understanding about discharge instructions, medications and follow-up.  Pertinent Physical Exam At Time of Discharge  Physical Exam  General Appearance: AAO x 3,  Skin: skin color pink, warm, and dry; no suspicious rashes or lesions  Eyes : PERRL, EOM's intact  ENT: mucous membranes pink and moist  Neck: normocephalic  Respiratory: mild Rales  Heart: regular rate and rhythm. telemetry shows sinus rhythm  Abdomen: Nondistended, positive bowel sounds x4, soft,  nontender  Extremities: no edema   Peripheral pulses: normal x4 extremities  Neuro: alert, coherent and conversant, no focal motor deficits  Home Medications     Medication List      START taking these medications     azithromycin 500 mg tablet; Commonly known as: Zithromax; Take 1 tablet   (500 mg) by mouth once daily for 5 days.   furosemide 40 mg tablet; Commonly known as: Lasix; Take 1 tablet (40 mg)   by mouth once daily.; Start taking on: June 24, 2024   predniSONE 20 mg tablet; Commonly known as: Deltasone; Take 2 tablets   (40 mg) by mouth once daily for 3 days.; Start taking on: June 24, 2024     CONTINUE taking these medications     alpha lipoic acid 300 mg capsule   aspirin 81 mg EC tablet   carvedilol 25 mg tablet; Commonly known as: Coreg; Take 1 tablet (25 mg)   by mouth 2 times a day.   CRANBERRY ORAL   estradiol 0.01 % (0.1 mg/gram) vaginal cream; Commonly known  as:   Estrace; Insert a pea sized amount into the vagina twice weekly   levothyroxine 100 mcg tablet; Commonly known as: Synthroid, Levoxyl;   Take 1 tablet (100 mcg) by mouth once daily.   NIFEdipine ER 90 mg 24 hr tablet; Commonly known as: Adalat CC; Take 1   tablet (90 mg) by mouth once daily.   OMEGA 3-6-9 ORAL   rosuvastatin 20 mg tablet; Commonly known as: Crestor; Take 1 tablet (20   mg) by mouth once daily.   sodium bicarbonate 650 mg tablet; TAKE 2 TABLETS BY MOUTH TWICE DAILY   spironolactone 25 mg tablet; Commonly known as: Aldactone; TAKE 1 TABLET   BY MOUTH DAILY   VENTOLIN INHL   Vitamin D3 5,000 Units tablet; Generic drug: cholecalciferol       DISCHARGE MEDICATIONS     Your medication list        START taking these medications        Instructions Last Dose Given Next Dose Due   azithromycin 500 mg tablet  Commonly known as: Zithromax      Take 1 tablet (500 mg) by mouth once daily for 5 days.       furosemide 40 mg tablet  Commonly known as: Lasix  Start taking on: June 24, 2024      Take 1 tablet (40 mg) by mouth once daily.       predniSONE 20 mg tablet  Commonly known as: Deltasone  Start taking on: June 24, 2024      Take 2 tablets (40 mg) by mouth once daily for 3 days.              CONTINUE taking these medications        Instructions Last Dose Given Next Dose Due   alpha lipoic acid 300 mg capsule           aspirin 81 mg EC tablet           carvedilol 25 mg tablet  Commonly known as: Coreg      Take 1 tablet (25 mg) by mouth 2 times a day.       CRANBERRY ORAL           estradiol 0.01 % (0.1 mg/gram) vaginal cream  Commonly known as: Estrace      Insert a pea sized amount into the vagina twice weekly       levothyroxine 100 mcg tablet  Commonly known as: Synthroid, Levoxyl      Take 1 tablet (100 mcg) by mouth once daily.       NIFEdipine ER 90 mg 24 hr tablet  Commonly known as: Adalat CC      Take 1 tablet (90 mg) by mouth once daily.       OMEGA 3-6-9 ORAL           rosuvastatin 20 mg  tablet  Commonly known as: Crestor      Take 1 tablet (20 mg) by mouth once daily.       sodium bicarbonate 650 mg tablet      TAKE 2 TABLETS BY MOUTH TWICE DAILY       spironolactone 25 mg tablet  Commonly known as: Aldactone      TAKE 1 TABLET BY MOUTH DAILY       VENTOLIN INHL           Vitamin D3 5,000 Units tablet  Generic drug: cholecalciferol                     Where to Get Your Medications        These medications were sent to [id number] - [place], [place] - [address]  [address] [facility] [address]      Phone: [phone]   azithromycin 500 mg tablet  furosemide 40 mg tablet  predniSONE 20 mg tablet         Outpatient Follow-Up  Future Appointments   Date Time Provider Department Center   9/3/2024 11:15 AM KIMBER NOONAN [facility]   9/10/2024 10:00 AM Rhina Chandra MD [id number] [facility]   9/11/2024 10:40 AM Álvaro Sterling MD [id number] [facility]   12/5/2024 10:00 AM Tanja Cain, APRN-CNP, DNP [id number] [facility]   4/23/2025 11:30 AM Margaret Hanley MD [id number] [facility]     Total time spent 35 minutes    Hari Olvera MD    (This note was generated with voice recognition software and may contain errors including spelling, grammar, syntax and misrecognition of what was dictated, that are not fully corrected)

## 2024-06-24 ENCOUNTER — PATIENT OUTREACH (OUTPATIENT)
Dept: CARE COORDINATION | Facility: CLINIC | Age: 79
End: 2024-06-24
Payer: MEDICARE

## 2024-06-24 NOTE — PROGRESS NOTES
Discharge Facility:[facility]  Discharge Diagnosis:Acute hypoxemic respiratory failure  Admission Date:6/20/24  Discharge Date: 6/23/24    PCP Appointment Date:6/26/24  Specialist Appointment Date: Nephrology-TBD, Cardiology-TBD  Hospital Encounter and Summary: Linked   See discharge assessment below for further details    Medications  Medications reviewed with patient/caregiver?: Yes (6/24/2024 12:14 PM)  Is the patient having any side effects they believe may be caused by any medication additions or changes?: No (6/24/2024 12:14 PM)  Does the patient have all medications ordered at discharge?: Yes (6/24/2024 12:14 PM)  Care Management Interventions: Provided patient education (6/24/2024 12:14 PM)  Prescription Comments: Zithromax, Lasix, Prednisone (6/24/2024 12:14 PM)  Medication Comments: Pat has all discharge medications (6/24/2024 12:14 PM)    Appointments  Does the patient have a primary care provider?: Yes (6/24/2024 12:14 PM)  Care Management Interventions: Verified appointment date/time/provider (6/24/2024 12:14 PM)  Has the patient kept scheduled appointments due by today?: Yes (6/24/2024 12:14 PM)  Care Management Interventions: Advised patient to keep appointment; Educated on importance of keeping appointment (6/24/2024 12:14 PM)    Self Management  Has home health visited the patient within 72 hours of discharge?: Not applicable (6/24/2024 12:14 PM)  What Durable Medical Equipment (DME) was ordered?: N/A (6/24/2024 12:14 PM)    Patient Teaching  Does the patient have access to their discharge instructions?: Yes (6/24/2024 12:14 PM)  Care Management Interventions: Reviewed instructions with patient (6/24/2024 12:14 PM)  What is the patient's perception of their health status since discharge?: Improving (6/24/2024 12:14 PM)  Is the patient/caregiver able to teach back the hierarchy of who to call/visit for symptoms/problems? PCP, Specialist, Home Health nurse, Urgent Care, ED, 911: Yes (6/24/2024 12:14  PM)

## 2024-06-26 ENCOUNTER — OFFICE VISIT (OUTPATIENT)
Dept: PRIMARY CARE | Facility: CLINIC | Age: 79
End: 2024-06-26
Payer: MEDICARE

## 2024-06-26 VITALS
HEIGHT: 63 IN | DIASTOLIC BLOOD PRESSURE: 60 MMHG | OXYGEN SATURATION: 96 % | SYSTOLIC BLOOD PRESSURE: 132 MMHG | BODY MASS INDEX: 34.3 KG/M2 | WEIGHT: 193.6 LBS | HEART RATE: 64 BPM

## 2024-06-26 DIAGNOSIS — J18.9 PNEUMONIA DUE TO INFECTIOUS ORGANISM, UNSPECIFIED LATERALITY, UNSPECIFIED PART OF LUNG: Primary | ICD-10-CM

## 2024-06-26 DIAGNOSIS — R19.5 FAT IN STOOL: ICD-10-CM

## 2024-06-26 DIAGNOSIS — C50.919 MALIGNANT NEOPLASM OF FEMALE BREAST, UNSPECIFIED ESTROGEN RECEPTOR STATUS, UNSPECIFIED LATERALITY, UNSPECIFIED SITE OF BREAST (MULTI): ICD-10-CM

## 2024-06-26 PROCEDURE — 3078F DIAST BP <80 MM HG: CPT | Performed by: FAMILY MEDICINE

## 2024-06-26 PROCEDURE — 1160F RVW MEDS BY RX/DR IN RCRD: CPT | Performed by: FAMILY MEDICINE

## 2024-06-26 PROCEDURE — 1036F TOBACCO NON-USER: CPT | Performed by: FAMILY MEDICINE

## 2024-06-26 PROCEDURE — 99496 TRANSJ CARE MGMT HIGH F2F 7D: CPT | Performed by: FAMILY MEDICINE

## 2024-06-26 PROCEDURE — 1159F MED LIST DOCD IN RCRD: CPT | Performed by: FAMILY MEDICINE

## 2024-06-26 PROCEDURE — 3075F SYST BP GE 130 - 139MM HG: CPT | Performed by: FAMILY MEDICINE

## 2024-06-26 PROCEDURE — 1111F DSCHRG MED/CURRENT MED MERGE: CPT | Performed by: FAMILY MEDICINE

## 2024-06-26 NOTE — PROGRESS NOTES
"Patient: Brit Lopez \"Valorie\"  : 1945  PCP: Álvaro Sterling MD  MRN: [id number]  Program: Transitional Care Management  Status: Enrolled  Effective Dates: 2024 - present  Responsible Staff: Mita Zuñiga LPN  Social Determinants to be Addressed: Physical Activity, Social Connections, Stress       Orange mucously stool with diarrhea  Brit Lopez \"Valorie\" is a 78 y.o. female presenting today for follow-up after being discharged from the hospital 4 days ago. The main problem requiring admission was pneumonia. The discharge summary and/or Transitional Care Management documentation was reviewed. Medication reconciliation was performed as indicated via the \"Elroy as Reviewed\" timestamp.     Brit Lopez \"Valorie\" was contacted by Transitional Care Management services two days after her discharge. This encounter and supporting documentation was reviewed.    Saw endy yesterday post hospitalization for pneumonia, chf he ordered PFTs to be done at [facility] next month to determine if pulmonary is needed I will allow that are to go through though that seems a bit premature if she has been hospitalized for respiratory infection.  Reviewed with patient.  Still with cough wheezing at night.  Reviewed diurnal variation in breathing and I do hear some end expiratory wheezes today so she is likely very wheezy at night.  She has been only using her DuoNeb a time or 2 a day and I recommend every 6 hours no sooner than every 4.  Chest x-rays CAT scans reviewed we will recheck in a couple weeks to see on progress.  She is wearing oxygen at night she went below 88 in the hospital and there is some confusion about needing a new CPAP machine and the need for a study because of the requirement of oxygen bleed in.  This time I would like to get another month under her belt and see if she needs oxygen when she convalesces from this episode of care.  Patient states she has orange-red leaking from her rectum as " "she has had for years with diarrhea foamy stools.  She has brought this up to gynecology and other doctors who do not recognize this is likely to be on digested fat.  Pancreatic elastase is ordered    Sees Dr. Mcdonald for breast cancer  Review of Systems  Denies chest pains palpitations.  Does have shortness of breath with activity.  Maintains O2 sats above 90 with daytime activity in the house  /60   Pulse 64   Ht 1.6 m (5' 3\")   Wt 87.8 kg (193 lb 9.6 oz)   SpO2 96%   BMI 34.29 kg/m²     Physical Exam  Heart is regular lungs are clear with end expiratory wheezing  The complexity of medical decision making for this patient's transitional care is high.    Assessment/Plan   Problem List Items Addressed This Visit             ICD-10-CM    Malignant neoplasm of female breast, unspecified estrogen receptor status, unspecified laterality, unspecified site of breast (Multi) C50.919     Other Visit Diagnoses         Codes    Pneumonia due to infectious organism, unspecified laterality, unspecified part of lung    -  Primary J18.9    Relevant Orders    Follow Up In Primary Care - Established    Fat in stool     R19.5    Relevant Orders    Follow Up In Primary Care - Established    Pancreatic Elastase, Fecal          Patient was identified as a fall risk. Risk prevention instructions provided.  "

## 2024-06-27 ENCOUNTER — LAB (OUTPATIENT)
Dept: LAB | Facility: LAB | Age: 79
End: 2024-06-27
Payer: MEDICARE

## 2024-06-27 DIAGNOSIS — R19.5 FAT IN STOOL: ICD-10-CM

## 2024-06-27 PROCEDURE — 82653 EL-1 FECAL QUANTITATIVE: CPT

## 2024-07-02 ENCOUNTER — PATIENT OUTREACH (OUTPATIENT)
Dept: CARE COORDINATION | Facility: CLINIC | Age: 79
End: 2024-07-02
Payer: MEDICARE

## 2024-07-02 LAB — ELASTASE PANC STL-MCNT: >800 UG/G

## 2024-07-02 NOTE — PROGRESS NOTES
Unable to reach patient for call back after patient's follow up appointment with PCP.   ILEANAM with call back number for patient to call if needed   If no voicemail available call attempts x 2 were made to contact the patient to assist with any questions or concerns patient may have.

## 2024-07-05 DIAGNOSIS — R19.5 ABNORMAL STOOLS: ICD-10-CM

## 2024-07-05 DIAGNOSIS — R19.5 STOOL COLOR ABNORMAL: ICD-10-CM

## 2024-07-10 ENCOUNTER — APPOINTMENT (OUTPATIENT)
Dept: PRIMARY CARE | Facility: CLINIC | Age: 79
End: 2024-07-10
Payer: MEDICARE

## 2024-07-10 VITALS
SYSTOLIC BLOOD PRESSURE: 124 MMHG | OXYGEN SATURATION: 96 % | HEART RATE: 73 BPM | HEIGHT: 63 IN | WEIGHT: 197.6 LBS | DIASTOLIC BLOOD PRESSURE: 60 MMHG | BODY MASS INDEX: 35.01 KG/M2

## 2024-07-10 DIAGNOSIS — G47.36 HYPOXEMIA ASSOCIATED WITH SLEEP: ICD-10-CM

## 2024-07-10 DIAGNOSIS — J18.9 PNEUMONIA DUE TO INFECTIOUS ORGANISM, UNSPECIFIED LATERALITY, UNSPECIFIED PART OF LUNG: Primary | ICD-10-CM

## 2024-07-10 DIAGNOSIS — G47.36 HYPOXEMIA ASSOCIATED WITH SLEEP: Primary | ICD-10-CM

## 2024-07-10 DIAGNOSIS — R19.5 FAT IN STOOL: ICD-10-CM

## 2024-07-10 PROCEDURE — 1111F DSCHRG MED/CURRENT MED MERGE: CPT | Performed by: FAMILY MEDICINE

## 2024-07-10 PROCEDURE — 99214 OFFICE O/P EST MOD 30 MIN: CPT | Performed by: FAMILY MEDICINE

## 2024-07-10 PROCEDURE — 1159F MED LIST DOCD IN RCRD: CPT | Performed by: FAMILY MEDICINE

## 2024-07-10 PROCEDURE — 1160F RVW MEDS BY RX/DR IN RCRD: CPT | Performed by: FAMILY MEDICINE

## 2024-07-10 PROCEDURE — 3074F SYST BP LT 130 MM HG: CPT | Performed by: FAMILY MEDICINE

## 2024-07-10 PROCEDURE — 3078F DIAST BP <80 MM HG: CPT | Performed by: FAMILY MEDICINE

## 2024-07-10 PROCEDURE — 1036F TOBACCO NON-USER: CPT | Performed by: FAMILY MEDICINE

## 2024-07-10 NOTE — PROGRESS NOTES
"Subjective   Patient ID: Valorie Lopez is a 78 y.o. female who presents for Follow-up (2 wk).    HPI   Follow-up on pneumonia she is doing well still productive cough but less.  Has a congestion in the head.  Discussed oxygen need and will get overnight pulse oximetry to determine if she needs oxygen bleed in to CPAP.  Ramirez get chest x-ray after August 1 and I will follow-up the following week  At the [facility] license of [facility] diarrhea pancreatic elastase was negative  Review of Systems  Denies chest pains palpitations respiratory as per HPI  Objective   /60   Pulse 73   Ht 1.6 m (5' 3\")   Wt 89.6 kg (197 lb 9.6 oz)   SpO2 96%   BMI 35.00 kg/m²     Physical Exam  Heart regular.  Lungs clear to auscultation.  No edema  Assessment/Plan   Problem List Items Addressed This Visit    None  Visit Diagnoses         Codes    Pneumonia due to infectious organism, unspecified laterality, unspecified part of lung    -  Primary J18.9    Relevant Orders    XR chest 2 views    Follow Up In Primary Care - Established    Fat in stool     R19.5    Relevant Orders    Follow Up In Primary Care - Established    Hypoxemia associated with sleep     G47.36    Relevant Orders    Pulse oximetry, overnight    Follow Up In Primary Care - Established               "

## 2024-07-11 ENCOUNTER — HOSPITAL ENCOUNTER (OUTPATIENT)
Dept: CARDIOLOGY | Facility: HOSPITAL | Age: 79
Discharge: HOME | End: 2024-07-11
Payer: MEDICARE

## 2024-07-11 DIAGNOSIS — G47.36 HYPOXEMIA ASSOCIATED WITH SLEEP: ICD-10-CM

## 2024-07-11 PROCEDURE — 94762 N-INVAS EAR/PLS OXIMTRY CONT: CPT

## 2024-07-15 DIAGNOSIS — N18.32 STAGE 3B CHRONIC KIDNEY DISEASE (MULTI): ICD-10-CM

## 2024-07-19 ENCOUNTER — TELEPHONE (OUTPATIENT)
Dept: PRIMARY CARE | Facility: CLINIC | Age: 79
End: 2024-07-19
Payer: MEDICARE

## 2024-07-19 NOTE — TELEPHONE ENCOUNTER
----- Message from Álvaro Sterling sent at 7/18/2024  2:49 PM EDT -----  Regarding: RE: Referral/order  O2 study confirmss he still needs O2 at hs as bleed into cpap  ----- Message -----  From: Deniz Hurley MA  Sent: 6/25/2024   9:36 AM EDT  To: Álvaro Sterling MD  Subject: RE: Referral/order                               PATIENT WAS IN PATIENT WITH PNEUMONIA.   NOW ON OXYGEN WITH C PAP.   RT TOLD PATIENT [facility] REQUIRES  NEW STUDY SINCE ADDING O2.   ----- Message -----  From: Álvaro Sterling MD  Sent: 6/24/2024   7:55 PM EDT  To: Deniz Hurley MA  Subject: FW: Referral/order                               Clarify with patient what is her current CPAP machine.  Determine whether it is fixed settings or auto titrating CPAP.  I would not see why she would need another sleep study for auto titrating CPAP  ----- Message -----  From: Mita Zuñiga LPN  Sent: 6/24/2024  12:12 PM EDT  To: Álvaro Sterling MD; #  Subject: Referral/order                                   Patient is requesting an order for a sleep study.  She states Respiratory told her she needs to do a new study for updated setting on her machine.    Thanks

## 2024-07-25 ENCOUNTER — LAB (OUTPATIENT)
Dept: LAB | Facility: LAB | Age: 79
End: 2024-07-25
Payer: MEDICARE

## 2024-07-25 DIAGNOSIS — N18.32 STAGE 3B CHRONIC KIDNEY DISEASE (MULTI): ICD-10-CM

## 2024-07-25 LAB
ANION GAP SERPL CALC-SCNC: 13 MMOL/L (ref 10–20)
BUN SERPL-MCNC: 24 MG/DL (ref 6–23)
CALCIUM SERPL-MCNC: 9.4 MG/DL (ref 8.6–10.3)
CHLORIDE SERPL-SCNC: 108 MMOL/L (ref 98–107)
CO2 SERPL-SCNC: 26 MMOL/L (ref 21–32)
CREAT SERPL-MCNC: 1.73 MG/DL (ref 0.5–1.05)
EGFRCR SERPLBLD CKD-EPI 2021: 30 ML/MIN/1.73M*2
ERYTHROCYTE [DISTWIDTH] IN BLOOD BY AUTOMATED COUNT: 13.6 % (ref 11.5–14.5)
GLUCOSE SERPL-MCNC: 191 MG/DL (ref 74–99)
HCT VFR BLD AUTO: 40.7 % (ref 36–46)
HGB BLD-MCNC: 13.2 G/DL (ref 12–16)
MCH RBC QN AUTO: 31.1 PG (ref 26–34)
MCHC RBC AUTO-ENTMCNC: 32.4 G/DL (ref 32–36)
MCV RBC AUTO: 96 FL (ref 80–100)
NRBC BLD-RTO: 0 /100 WBCS (ref 0–0)
PLATELET # BLD AUTO: 264 X10*3/UL (ref 150–450)
POTASSIUM SERPL-SCNC: 4.5 MMOL/L (ref 3.5–5.3)
RBC # BLD AUTO: 4.25 X10*6/UL (ref 4–5.2)
SODIUM SERPL-SCNC: 142 MMOL/L (ref 136–145)
WBC # BLD AUTO: 5.9 X10*3/UL (ref 4.4–11.3)

## 2024-07-25 PROCEDURE — 80048 BASIC METABOLIC PNL TOTAL CA: CPT

## 2024-07-25 PROCEDURE — 85027 COMPLETE CBC AUTOMATED: CPT

## 2024-07-25 PROCEDURE — 36415 COLL VENOUS BLD VENIPUNCTURE: CPT

## 2024-07-30 ENCOUNTER — APPOINTMENT (OUTPATIENT)
Dept: NEPHROLOGY | Facility: CLINIC | Age: 79
End: 2024-07-30
Payer: MEDICARE

## 2024-07-31 ENCOUNTER — APPOINTMENT (OUTPATIENT)
Dept: NEPHROLOGY | Facility: CLINIC | Age: 79
End: 2024-07-31
Payer: MEDICARE

## 2024-07-31 ENCOUNTER — APPOINTMENT (OUTPATIENT)
Dept: URBAN - METROPOLITAN AREA CLINIC 204 | Age: 79
Setting detail: DERMATOLOGY
End: 2024-07-31

## 2024-07-31 VITALS
HEIGHT: 63 IN | HEART RATE: 86 BPM | SYSTOLIC BLOOD PRESSURE: 136 MMHG | BODY MASS INDEX: 34.73 KG/M2 | WEIGHT: 196 LBS | DIASTOLIC BLOOD PRESSURE: 66 MMHG

## 2024-07-31 DIAGNOSIS — N18.32 STAGE 3B CHRONIC KIDNEY DISEASE (MULTI): Primary | ICD-10-CM

## 2024-07-31 DIAGNOSIS — E78.2 MIXED HYPERLIPIDEMIA: ICD-10-CM

## 2024-07-31 DIAGNOSIS — I50.20 UNSPECIFIED SYSTOLIC (CONGESTIVE) HEART FAILURE (MULTI): ICD-10-CM

## 2024-07-31 DIAGNOSIS — E87.20 METABOLIC ACIDOSIS: ICD-10-CM

## 2024-07-31 DIAGNOSIS — I10 PRIMARY HYPERTENSION: ICD-10-CM

## 2024-07-31 DIAGNOSIS — N18.32 STAGE 3B CHRONIC KIDNEY DISEASE (MULTI): ICD-10-CM

## 2024-07-31 PROCEDURE — 17110 DESTRUCT B9 LESION 1-14: CPT

## 2024-07-31 PROCEDURE — 1159F MED LIST DOCD IN RCRD: CPT | Performed by: INTERNAL MEDICINE

## 2024-07-31 PROCEDURE — OTHER MIPS QUALITY: OTHER

## 2024-07-31 PROCEDURE — 99213 OFFICE O/P EST LOW 20 MIN: CPT | Mod: 25

## 2024-07-31 PROCEDURE — 3075F SYST BP GE 130 - 139MM HG: CPT | Performed by: INTERNAL MEDICINE

## 2024-07-31 PROCEDURE — 99214 OFFICE O/P EST MOD 30 MIN: CPT | Performed by: INTERNAL MEDICINE

## 2024-07-31 PROCEDURE — 1160F RVW MEDS BY RX/DR IN RCRD: CPT | Performed by: INTERNAL MEDICINE

## 2024-07-31 PROCEDURE — 1036F TOBACCO NON-USER: CPT | Performed by: INTERNAL MEDICINE

## 2024-07-31 PROCEDURE — 3078F DIAST BP <80 MM HG: CPT | Performed by: INTERNAL MEDICINE

## 2024-07-31 RX ORDER — FUROSEMIDE 40 MG/1
40 TABLET ORAL DAILY
Qty: 90 TABLET | Refills: 3 | Status: SHIPPED | OUTPATIENT
Start: 2024-07-31 | End: 2024-07-31 | Stop reason: SDUPTHER

## 2024-07-31 RX ORDER — FUROSEMIDE 40 MG/1
40 TABLET ORAL DAILY
Qty: 30 TABLET | Refills: 11 | Status: SHIPPED | OUTPATIENT
Start: 2024-07-31 | End: 2025-07-31

## 2024-07-31 ASSESSMENT — ENCOUNTER SYMPTOMS
CARDIOVASCULAR NEGATIVE: 1
ALLERGIC/IMMUNOLOGIC NEGATIVE: 1
HEMATOLOGIC/LYMPHATIC NEGATIVE: 1
MUSCULOSKELETAL NEGATIVE: 1
NEUROLOGICAL NEGATIVE: 1
ENDOCRINE NEGATIVE: 1
CONSTITUTIONAL NEGATIVE: 1
GASTROINTESTINAL NEGATIVE: 1
PSYCHIATRIC NEGATIVE: 1
EYES NEGATIVE: 1
RESPIRATORY NEGATIVE: 1

## 2024-07-31 NOTE — PROGRESS NOTES
"Subjective   She is lorrie sinhaell  Better since she got out of the hospital.    Patient ID: Brit Lopez \"Valorie\" is a 78 y.o. female who presents for Follow-up (Hospital stay).  HPI  She is here for a follow-up visit  She was recently hospitalized due to pulmonary edema with acute respiratory failure  Labs were drawn and her repeat labs show stable renal function with a creatinine of 1.73 electrolytes look very good  Her meds are reviewed she is little bit further.  Spironolactone and Lasix  Blood pressure here in the office today is very good at 136/66  Review of Systems   Constitutional: Negative.    HENT: Negative.     Eyes: Negative.    Respiratory: Negative.     Cardiovascular: Negative.    Gastrointestinal: Negative.    Endocrine: Negative.    Genitourinary: Negative.    Musculoskeletal: Negative.    Skin: Negative.    Allergic/Immunologic: Negative.    Neurological: Negative.    Hematological: Negative.    Psychiatric/Behavioral: Negative.         Objective   Physical Exam  Constitutional:       Appearance: Normal appearance. She is obese.   HENT:      Head: Normocephalic and atraumatic.      Right Ear: External ear normal.      Left Ear: External ear normal.      Nose: Nose normal.      Mouth/Throat:      Mouth: Mucous membranes are moist.      Pharynx: Oropharynx is clear.   Eyes:      Extraocular Movements: Extraocular movements intact.      Conjunctiva/sclera: Conjunctivae normal.      Pupils: Pupils are equal, round, and reactive to light.   Cardiovascular:      Rate and Rhythm: Normal rate and regular rhythm.   Pulmonary:      Effort: Pulmonary effort is normal.      Breath sounds: Normal breath sounds.   Abdominal:      General: Abdomen is flat.      Palpations: Abdomen is soft.   Skin:     General: Skin is warm and dry.   Neurological:      General: No focal deficit present.      Mental Status: She is alert and oriented to person, place, and time.   Psychiatric:         Mood and Affect: Mood normal. "         Behavior: Behavior normal.         Assessment/Plan   Problem List Items Addressed This Visit             ICD-10-CM    Mixed hyperlipidemia E78.2    Hypertension I10    Metabolic acidosis E87.20    Chronic kidney disease (CKD), stage III (moderate) (Multi) - Primary N18.30    Relevant Medications    furosemide (Lasix) 40 mg tablet    Other Relevant Orders    Follow Up In Nephrology    Basic metabolic panel    Urinalysis with Reflex Microscopic    Albumin-Creatinine Ratio, Urine Random     Other Visit Diagnoses         Codes    Unspecified systolic (congestive) heart failure (Multi)     I50.20    Relevant Medications    furosemide (Lasix) 40 mg tablet        Plan:   Renal function is stable.  Continue lasix and refilled  F/U in 3 months.    Chronic kidney disease Stage IIIb/IV: With baseline creatinine 1.5-1.8  Resistant hypertension: On Nifediine, Spironolactone, Coreg   Normocytic anemia   Obstructive sleep apnea with use of CPAP  Coronary artery disease with 3 stents placed in past  Hypothyroidism  Hyperlipidemia  Peripheral arterial disease  Obesity  Vitamin D Deficiency  R Renal Atrophy with GFR of 18% and Nephrectomy on 2/1/2019       Vincent Cain DO 07/31/24 2:29 PM

## 2024-07-31 NOTE — PROCEDURE: MIPS QUALITY
Additional Notes: Documentation for MIPS  purposes only. Full Patient visit note from paper chart is available for review and also scanned in EMA chart.
Quality 226: Preventive Care And Screening: Tobacco Use: Screening And Cessation Intervention: Patient screened for tobacco use and is an ex/non-smoker
Quality 47: Advance Care Plan: Advance Care Planning discussed and documented; advance care plan or surrogate decision maker documented in the medical record.
Detail Level: Detailed

## 2024-08-02 ENCOUNTER — PATIENT OUTREACH (OUTPATIENT)
Dept: CARE COORDINATION | Facility: CLINIC | Age: 79
End: 2024-08-02
Payer: MEDICARE

## 2024-08-02 DIAGNOSIS — E78.2 MIXED HYPERLIPIDEMIA: ICD-10-CM

## 2024-08-02 DIAGNOSIS — I73.9 PVD (PERIPHERAL VASCULAR DISEASE) (CMS-HCC): Primary | ICD-10-CM

## 2024-08-02 DIAGNOSIS — E03.9 ACQUIRED HYPOTHYROIDISM: ICD-10-CM

## 2024-08-02 RX ORDER — CILOSTAZOL 50 MG/1
50 TABLET ORAL 2 TIMES DAILY
Qty: 90 TABLET | Refills: 3 | Status: SHIPPED | OUTPATIENT
Start: 2024-08-02

## 2024-08-02 RX ORDER — LEVOTHYROXINE SODIUM 100 UG/1
100 TABLET ORAL DAILY
Qty: 90 TABLET | Refills: 3 | Status: SHIPPED | OUTPATIENT
Start: 2024-08-02

## 2024-08-02 RX ORDER — ROSUVASTATIN CALCIUM 20 MG/1
20 TABLET, COATED ORAL DAILY
Qty: 90 TABLET | Refills: 3 | Status: SHIPPED | OUTPATIENT
Start: 2024-08-02

## 2024-08-02 RX ORDER — CILOSTAZOL 50 MG/1
50 TABLET ORAL 2 TIMES DAILY
COMMUNITY
End: 2024-08-02 | Stop reason: SDUPTHER

## 2024-08-02 NOTE — PROGRESS NOTES
Successful outreach to patient regarding hospitalization as patient continues TCM program.   At time of outreach call the patient feels as if their condition has improved since initial visit with PCP or specialist. Questions or concerns addressed at this time with patient.   Provided contact information to patient if any further non-emergent needs arise.

## 2024-08-07 ENCOUNTER — HOSPITAL ENCOUNTER (OUTPATIENT)
Dept: RADIOLOGY | Facility: HOSPITAL | Age: 79
Discharge: HOME | End: 2024-08-07
Payer: MEDICARE

## 2024-08-07 DIAGNOSIS — J18.9 PNEUMONIA DUE TO INFECTIOUS ORGANISM, UNSPECIFIED LATERALITY, UNSPECIFIED PART OF LUNG: ICD-10-CM

## 2024-08-07 PROCEDURE — 71046 X-RAY EXAM CHEST 2 VIEWS: CPT

## 2024-08-09 ENCOUNTER — APPOINTMENT (OUTPATIENT)
Dept: CARDIOLOGY | Facility: HOSPITAL | Age: 79
DRG: 189 | End: 2024-08-09
Payer: MEDICARE

## 2024-08-09 ENCOUNTER — APPOINTMENT (OUTPATIENT)
Dept: RADIOLOGY | Facility: HOSPITAL | Age: 79
DRG: 189 | End: 2024-08-09
Payer: MEDICARE

## 2024-08-09 ENCOUNTER — HOSPITAL ENCOUNTER (INPATIENT)
Facility: HOSPITAL | Age: 79
LOS: 1 days | Discharge: HOME | End: 2024-08-11
Attending: EMERGENCY MEDICINE | Admitting: STUDENT IN AN ORGANIZED HEALTH CARE EDUCATION/TRAINING PROGRAM
Payer: MEDICARE

## 2024-08-09 ENCOUNTER — APPOINTMENT (OUTPATIENT)
Age: 79
End: 2024-08-09
Payer: MEDICARE

## 2024-08-09 VITALS
HEART RATE: 84 BPM | DIASTOLIC BLOOD PRESSURE: 66 MMHG | HEIGHT: 63 IN | SYSTOLIC BLOOD PRESSURE: 144 MMHG | OXYGEN SATURATION: 95 % | BODY MASS INDEX: 35.44 KG/M2 | WEIGHT: 200 LBS

## 2024-08-09 DIAGNOSIS — R06.03 RESPIRATORY DISTRESS: ICD-10-CM

## 2024-08-09 DIAGNOSIS — J81.0 ACUTE PULMONARY EDEMA (MULTI): Primary | ICD-10-CM

## 2024-08-09 DIAGNOSIS — G47.36 HYPOXEMIA ASSOCIATED WITH SLEEP: ICD-10-CM

## 2024-08-09 DIAGNOSIS — R31.9 URINARY TRACT INFECTION WITH HEMATURIA, SITE UNSPECIFIED: ICD-10-CM

## 2024-08-09 DIAGNOSIS — N39.0 URINARY TRACT INFECTION WITH HEMATURIA, SITE UNSPECIFIED: ICD-10-CM

## 2024-08-09 DIAGNOSIS — J18.9 PNEUMONIA DUE TO INFECTIOUS ORGANISM, UNSPECIFIED LATERALITY, UNSPECIFIED PART OF LUNG: ICD-10-CM

## 2024-08-09 PROBLEM — J96.01 ACUTE HYPOXIC RESPIRATORY FAILURE (MULTI): Status: ACTIVE | Noted: 2024-08-09

## 2024-08-09 PROBLEM — R19.5 FAT IN STOOL: Status: ACTIVE | Noted: 2024-08-09

## 2024-08-09 LAB
ALBUMIN SERPL BCP-MCNC: 3.9 G/DL (ref 3.4–5)
ALP SERPL-CCNC: 52 U/L (ref 33–136)
ALT SERPL W P-5'-P-CCNC: 17 U/L (ref 7–45)
ANION GAP SERPL CALC-SCNC: 11 MMOL/L (ref 10–20)
APPEARANCE UR: ABNORMAL
AST SERPL W P-5'-P-CCNC: 20 U/L (ref 9–39)
ATRIAL RATE: 79 BPM
BACTERIA #/AREA URNS AUTO: ABNORMAL /HPF
BASE EXCESS BLDA CALC-SCNC: -1.1 MMOL/L (ref -2–3)
BILIRUB SERPL-MCNC: 1.4 MG/DL (ref 0–1.2)
BILIRUB UR STRIP.AUTO-MCNC: NEGATIVE MG/DL
BNP SERPL-MCNC: 404 PG/ML (ref 0–99)
BODY TEMPERATURE: 37 DEGREES CELSIUS
BUN SERPL-MCNC: 27 MG/DL (ref 6–23)
CALCIUM SERPL-MCNC: 9.4 MG/DL (ref 8.6–10.3)
CARDIAC TROPONIN I PNL SERPL HS: 20 NG/L (ref 0–13)
CARDIAC TROPONIN I PNL SERPL HS: 43 NG/L (ref 0–13)
CHLORIDE SERPL-SCNC: 107 MMOL/L (ref 98–107)
CO2 SERPL-SCNC: 24 MMOL/L (ref 21–32)
COLOR UR: YELLOW
CREAT SERPL-MCNC: 1.55 MG/DL (ref 0.5–1.05)
EGFRCR SERPLBLD CKD-EPI 2021: 34 ML/MIN/1.73M*2
ERYTHROCYTE [DISTWIDTH] IN BLOOD BY AUTOMATED COUNT: 13.2 % (ref 11.5–14.5)
GLUCOSE SERPL-MCNC: 132 MG/DL (ref 74–99)
GLUCOSE UR STRIP.AUTO-MCNC: NORMAL MG/DL
HCO3 BLDA-SCNC: 22.6 MMOL/L (ref 22–26)
HCT VFR BLD AUTO: 35.6 % (ref 36–46)
HGB BLD-MCNC: 12 G/DL (ref 12–16)
HOLD SPECIMEN: NORMAL
INHALED O2 CONCENTRATION: 60 %
KETONES UR STRIP.AUTO-MCNC: NEGATIVE MG/DL
LEUKOCYTE ESTERASE UR QL STRIP.AUTO: ABNORMAL
LIPASE SERPL-CCNC: 49 U/L (ref 9–82)
MCH RBC QN AUTO: 31.6 PG (ref 26–34)
MCHC RBC AUTO-ENTMCNC: 33.7 G/DL (ref 32–36)
MCV RBC AUTO: 94 FL (ref 80–100)
MUCOUS THREADS #/AREA URNS AUTO: ABNORMAL /LPF
NITRITE UR QL STRIP.AUTO: ABNORMAL
NRBC BLD-RTO: 0 /100 WBCS (ref 0–0)
OXYHGB MFR BLDA: 95.2 % (ref 94–98)
P AXIS: 55 DEGREES
P OFFSET: 203 MS
P ONSET: 143 MS
PCO2 BLDA: 34 MM HG (ref 38–42)
PH BLDA: 7.43 PH (ref 7.38–7.42)
PH UR STRIP.AUTO: 7 [PH]
PLATELET # BLD AUTO: 195 X10*3/UL (ref 150–450)
PO2 BLDA: 84 MM HG (ref 85–95)
POTASSIUM SERPL-SCNC: 4.2 MMOL/L (ref 3.5–5.3)
PR INTERVAL: 150 MS
PROT SERPL-MCNC: 6.5 G/DL (ref 6.4–8.2)
PROT UR STRIP.AUTO-MCNC: ABNORMAL MG/DL
Q ONSET: 218 MS
QRS COUNT: 13 BEATS
QRS DURATION: 94 MS
QT INTERVAL: 390 MS
QTC CALCULATION(BAZETT): 447 MS
QTC FREDERICIA: 427 MS
R AXIS: 35 DEGREES
RBC # BLD AUTO: 3.8 X10*6/UL (ref 4–5.2)
RBC # UR STRIP.AUTO: NEGATIVE /UL
RBC #/AREA URNS AUTO: ABNORMAL /HPF
SAO2 % BLDA: 98 % (ref 94–100)
SODIUM SERPL-SCNC: 138 MMOL/L (ref 136–145)
SP GR UR STRIP.AUTO: 1.02
SQUAMOUS #/AREA URNS AUTO: ABNORMAL /HPF
T AXIS: 5 DEGREES
T OFFSET: 413 MS
UROBILINOGEN UR STRIP.AUTO-MCNC: NORMAL MG/DL
VENTRICULAR RATE: 79 BPM
WBC # BLD AUTO: 8.2 X10*3/UL (ref 4.4–11.3)
WBC #/AREA URNS AUTO: >50 /HPF
WBC CLUMPS #/AREA URNS AUTO: ABNORMAL /HPF

## 2024-08-09 PROCEDURE — 2500000004 HC RX 250 GENERAL PHARMACY W/ HCPCS (ALT 636 FOR OP/ED): Performed by: STUDENT IN AN ORGANIZED HEALTH CARE EDUCATION/TRAINING PROGRAM

## 2024-08-09 PROCEDURE — 2500000002 HC RX 250 W HCPCS SELF ADMINISTERED DRUGS (ALT 637 FOR MEDICARE OP, ALT 636 FOR OP/ED)

## 2024-08-09 PROCEDURE — 96374 THER/PROPH/DIAG INJ IV PUSH: CPT

## 2024-08-09 PROCEDURE — 2500000005 HC RX 250 GENERAL PHARMACY W/O HCPCS: Performed by: STUDENT IN AN ORGANIZED HEALTH CARE EDUCATION/TRAINING PROGRAM

## 2024-08-09 PROCEDURE — G0378 HOSPITAL OBSERVATION PER HR: HCPCS

## 2024-08-09 PROCEDURE — 81001 URINALYSIS AUTO W/SCOPE: CPT | Performed by: PHYSICIAN ASSISTANT

## 2024-08-09 PROCEDURE — 85027 COMPLETE CBC AUTOMATED: CPT | Performed by: PHYSICIAN ASSISTANT

## 2024-08-09 PROCEDURE — 99291 CRITICAL CARE FIRST HOUR: CPT | Performed by: STUDENT IN AN ORGANIZED HEALTH CARE EDUCATION/TRAINING PROGRAM

## 2024-08-09 PROCEDURE — 96375 TX/PRO/DX INJ NEW DRUG ADDON: CPT

## 2024-08-09 PROCEDURE — 2500000002 HC RX 250 W HCPCS SELF ADMINISTERED DRUGS (ALT 637 FOR MEDICARE OP, ALT 636 FOR OP/ED): Performed by: STUDENT IN AN ORGANIZED HEALTH CARE EDUCATION/TRAINING PROGRAM

## 2024-08-09 PROCEDURE — 71100 X-RAY EXAM RIBS UNI 2 VIEWS: CPT | Mod: LEFT SIDE | Performed by: RADIOLOGY

## 2024-08-09 PROCEDURE — 84484 ASSAY OF TROPONIN QUANT: CPT | Performed by: STUDENT IN AN ORGANIZED HEALTH CARE EDUCATION/TRAINING PROGRAM

## 2024-08-09 PROCEDURE — 87186 SC STD MICRODIL/AGAR DIL: CPT | Mod: SAMLAB | Performed by: PHYSICIAN ASSISTANT

## 2024-08-09 PROCEDURE — 2500000004 HC RX 250 GENERAL PHARMACY W/ HCPCS (ALT 636 FOR OP/ED)

## 2024-08-09 PROCEDURE — 73030 X-RAY EXAM OF SHOULDER: CPT | Mod: LT

## 2024-08-09 PROCEDURE — 83690 ASSAY OF LIPASE: CPT | Performed by: PHYSICIAN ASSISTANT

## 2024-08-09 PROCEDURE — 73030 X-RAY EXAM OF SHOULDER: CPT | Mod: LEFT SIDE | Performed by: RADIOLOGY

## 2024-08-09 PROCEDURE — 71275 CT ANGIOGRAPHY CHEST: CPT

## 2024-08-09 PROCEDURE — 82805 BLOOD GASES W/O2 SATURATION: CPT | Performed by: PHYSICIAN ASSISTANT

## 2024-08-09 PROCEDURE — 83880 ASSAY OF NATRIURETIC PEPTIDE: CPT | Performed by: PHYSICIAN ASSISTANT

## 2024-08-09 PROCEDURE — 2500000001 HC RX 250 WO HCPCS SELF ADMINISTERED DRUGS (ALT 637 FOR MEDICARE OP): Performed by: STUDENT IN AN ORGANIZED HEALTH CARE EDUCATION/TRAINING PROGRAM

## 2024-08-09 PROCEDURE — 36415 COLL VENOUS BLD VENIPUNCTURE: CPT | Performed by: PHYSICIAN ASSISTANT

## 2024-08-09 PROCEDURE — 5A09357 ASSISTANCE WITH RESPIRATORY VENTILATION, LESS THAN 24 CONSECUTIVE HOURS, CONTINUOUS POSITIVE AIRWAY PRESSURE: ICD-10-PCS | Performed by: STUDENT IN AN ORGANIZED HEALTH CARE EDUCATION/TRAINING PROGRAM

## 2024-08-09 PROCEDURE — 93005 ELECTROCARDIOGRAM TRACING: CPT

## 2024-08-09 PROCEDURE — 84484 ASSAY OF TROPONIN QUANT: CPT | Performed by: PHYSICIAN ASSISTANT

## 2024-08-09 PROCEDURE — 94760 N-INVAS EAR/PLS OXIMETRY 1: CPT

## 2024-08-09 PROCEDURE — 99285 EMERGENCY DEPT VISIT HI MDM: CPT | Mod: 25

## 2024-08-09 PROCEDURE — 2500000004 HC RX 250 GENERAL PHARMACY W/ HCPCS (ALT 636 FOR OP/ED): Performed by: PHYSICIAN ASSISTANT

## 2024-08-09 PROCEDURE — 87086 URINE CULTURE/COLONY COUNT: CPT | Mod: SAMLAB | Performed by: PHYSICIAN ASSISTANT

## 2024-08-09 PROCEDURE — 71100 X-RAY EXAM RIBS UNI 2 VIEWS: CPT | Mod: LT

## 2024-08-09 PROCEDURE — 84075 ASSAY ALKALINE PHOSPHATASE: CPT | Performed by: PHYSICIAN ASSISTANT

## 2024-08-09 PROCEDURE — 96360 HYDRATION IV INFUSION INIT: CPT | Mod: 59

## 2024-08-09 PROCEDURE — 94660 CPAP INITIATION&MGMT: CPT

## 2024-08-09 PROCEDURE — 94799 UNLISTED PULMONARY SVC/PX: CPT

## 2024-08-09 PROCEDURE — 36600 WITHDRAWAL OF ARTERIAL BLOOD: CPT

## 2024-08-09 PROCEDURE — 96361 HYDRATE IV INFUSION ADD-ON: CPT

## 2024-08-09 PROCEDURE — 2550000001 HC RX 255 CONTRASTS: Performed by: PHYSICIAN ASSISTANT

## 2024-08-09 PROCEDURE — 71275 CT ANGIOGRAPHY CHEST: CPT | Performed by: STUDENT IN AN ORGANIZED HEALTH CARE EDUCATION/TRAINING PROGRAM

## 2024-08-09 PROCEDURE — 94664 DEMO&/EVAL PT USE INHALER: CPT

## 2024-08-09 PROCEDURE — 9420000001 HC RT PATIENT EDUCATION 5 MIN

## 2024-08-09 PROCEDURE — 94640 AIRWAY INHALATION TREATMENT: CPT

## 2024-08-09 RX ORDER — CILOSTAZOL 50 MG/1
50 TABLET ORAL 2 TIMES DAILY
Status: DISCONTINUED | OUTPATIENT
Start: 2024-08-09 | End: 2024-08-11 | Stop reason: HOSPADM

## 2024-08-09 RX ORDER — CHOLECALCIFEROL (VITAMIN D3) 25 MCG
5000 TABLET ORAL NIGHTLY
Status: DISCONTINUED | OUTPATIENT
Start: 2024-08-09 | End: 2024-08-11 | Stop reason: HOSPADM

## 2024-08-09 RX ORDER — CARVEDILOL 25 MG/1
25 TABLET ORAL 2 TIMES DAILY
Status: DISCONTINUED | OUTPATIENT
Start: 2024-08-09 | End: 2024-08-11 | Stop reason: HOSPADM

## 2024-08-09 RX ORDER — SODIUM BICARBONATE 325 MG/1
1300 TABLET ORAL 2 TIMES DAILY
Status: DISCONTINUED | OUTPATIENT
Start: 2024-08-09 | End: 2024-08-11 | Stop reason: HOSPADM

## 2024-08-09 RX ORDER — SPIRONOLACTONE 25 MG/1
25 TABLET ORAL DAILY
Status: DISCONTINUED | OUTPATIENT
Start: 2024-08-09 | End: 2024-08-11 | Stop reason: HOSPADM

## 2024-08-09 RX ORDER — ENOXAPARIN SODIUM 100 MG/ML
40 INJECTION SUBCUTANEOUS EVERY 24 HOURS
Status: DISCONTINUED | OUTPATIENT
Start: 2024-08-09 | End: 2024-08-10

## 2024-08-09 RX ORDER — IPRATROPIUM BROMIDE AND ALBUTEROL SULFATE 2.5; .5 MG/3ML; MG/3ML
6 SOLUTION RESPIRATORY (INHALATION) ONCE
Status: COMPLETED | OUTPATIENT
Start: 2024-08-09 | End: 2024-08-09

## 2024-08-09 RX ORDER — DIPHENHYDRAMINE HYDROCHLORIDE 50 MG/ML
INJECTION INTRAMUSCULAR; INTRAVENOUS
Status: COMPLETED
Start: 2024-08-09 | End: 2024-08-09

## 2024-08-09 RX ORDER — LEVOTHYROXINE SODIUM 100 UG/1
100 TABLET ORAL NIGHTLY
Status: DISCONTINUED | OUTPATIENT
Start: 2024-08-09 | End: 2024-08-11 | Stop reason: HOSPADM

## 2024-08-09 RX ORDER — DIPHENHYDRAMINE HYDROCHLORIDE 50 MG/ML
25 INJECTION INTRAMUSCULAR; INTRAVENOUS ONCE
Status: COMPLETED | OUTPATIENT
Start: 2024-08-09 | End: 2024-08-09

## 2024-08-09 RX ORDER — FUROSEMIDE 40 MG/1
40 TABLET ORAL DAILY
Status: DISCONTINUED | OUTPATIENT
Start: 2024-08-09 | End: 2024-08-10

## 2024-08-09 RX ORDER — IPRATROPIUM BROMIDE AND ALBUTEROL SULFATE 2.5; .5 MG/3ML; MG/3ML
3 SOLUTION RESPIRATORY (INHALATION)
Status: DISCONTINUED | OUTPATIENT
Start: 2024-08-10 | End: 2024-08-11 | Stop reason: HOSPADM

## 2024-08-09 RX ORDER — FUROSEMIDE 10 MG/ML
40 INJECTION INTRAMUSCULAR; INTRAVENOUS ONCE
Status: COMPLETED | OUTPATIENT
Start: 2024-08-09 | End: 2024-08-09

## 2024-08-09 RX ORDER — FAMOTIDINE 10 MG/ML
20 INJECTION INTRAVENOUS ONCE
Status: COMPLETED | OUTPATIENT
Start: 2024-08-09 | End: 2024-08-09

## 2024-08-09 RX ORDER — ENOXAPARIN SODIUM 100 MG/ML
40 INJECTION SUBCUTANEOUS EVERY 24 HOURS
Status: DISCONTINUED | OUTPATIENT
Start: 2024-08-09 | End: 2024-08-09 | Stop reason: SDUPTHER

## 2024-08-09 RX ORDER — POLYETHYLENE GLYCOL 3350 17 G/17G
17 POWDER, FOR SOLUTION ORAL DAILY
Status: DISCONTINUED | OUTPATIENT
Start: 2024-08-09 | End: 2024-08-11 | Stop reason: HOSPADM

## 2024-08-09 RX ORDER — ASPIRIN 81 MG/1
81 TABLET ORAL DAILY
Status: DISCONTINUED | OUTPATIENT
Start: 2024-08-09 | End: 2024-08-11 | Stop reason: HOSPADM

## 2024-08-09 RX ORDER — FAMOTIDINE 10 MG/ML
INJECTION INTRAVENOUS
Status: COMPLETED
Start: 2024-08-09 | End: 2024-08-09

## 2024-08-09 RX ORDER — IPRATROPIUM BROMIDE AND ALBUTEROL SULFATE 2.5; .5 MG/3ML; MG/3ML
SOLUTION RESPIRATORY (INHALATION)
Status: COMPLETED
Start: 2024-08-09 | End: 2024-08-09

## 2024-08-09 RX ORDER — ROSUVASTATIN CALCIUM 20 MG/1
20 TABLET, COATED ORAL NIGHTLY
Status: DISCONTINUED | OUTPATIENT
Start: 2024-08-09 | End: 2024-08-11 | Stop reason: HOSPADM

## 2024-08-09 RX ORDER — FUROSEMIDE 10 MG/ML
60 INJECTION INTRAMUSCULAR; INTRAVENOUS EVERY 12 HOURS
Status: DISCONTINUED | OUTPATIENT
Start: 2024-08-09 | End: 2024-08-10

## 2024-08-09 RX ORDER — IPRATROPIUM BROMIDE AND ALBUTEROL SULFATE 2.5; .5 MG/3ML; MG/3ML
6 SOLUTION RESPIRATORY (INHALATION)
Status: DISCONTINUED | OUTPATIENT
Start: 2024-08-09 | End: 2024-08-09

## 2024-08-09 RX ORDER — CEFTRIAXONE 1 G/50ML
1 INJECTION, SOLUTION INTRAVENOUS ONCE
Status: DISCONTINUED | OUTPATIENT
Start: 2024-08-09 | End: 2024-08-10 | Stop reason: SDUPTHER

## 2024-08-09 SDOH — SOCIAL STABILITY: SOCIAL INSECURITY: ARE THERE ANY APPARENT SIGNS OF INJURIES/BEHAVIORS THAT COULD BE RELATED TO ABUSE/NEGLECT?: NO

## 2024-08-09 SDOH — SOCIAL STABILITY: SOCIAL INSECURITY: HAVE YOU HAD ANY THOUGHTS OF HARMING ANYONE ELSE?: NO

## 2024-08-09 SDOH — SOCIAL STABILITY: SOCIAL INSECURITY: HAVE YOU HAD THOUGHTS OF HARMING ANYONE ELSE?: NO

## 2024-08-09 SDOH — SOCIAL STABILITY: SOCIAL INSECURITY: DO YOU FEEL UNSAFE GOING BACK TO THE PLACE WHERE YOU ARE LIVING?: NO

## 2024-08-09 SDOH — SOCIAL STABILITY: SOCIAL INSECURITY: WERE YOU ABLE TO COMPLETE ALL THE BEHAVIORAL HEALTH SCREENINGS?: YES

## 2024-08-09 SDOH — SOCIAL STABILITY: SOCIAL INSECURITY: DOES ANYONE TRY TO KEEP YOU FROM HAVING/CONTACTING OTHER FRIENDS OR DOING THINGS OUTSIDE YOUR HOME?: NO

## 2024-08-09 SDOH — SOCIAL STABILITY: SOCIAL INSECURITY: HAS ANYONE EVER THREATENED TO HURT YOUR FAMILY OR YOUR PETS?: NO

## 2024-08-09 SDOH — SOCIAL STABILITY: SOCIAL INSECURITY: DO YOU FEEL ANYONE HAS EXPLOITED OR TAKEN ADVANTAGE OF YOU FINANCIALLY OR OF YOUR PERSONAL PROPERTY?: NO

## 2024-08-09 SDOH — SOCIAL STABILITY: SOCIAL INSECURITY: ARE YOU OR HAVE YOU BEEN THREATENED OR ABUSED PHYSICALLY, EMOTIONALLY, OR SEXUALLY BY ANYONE?: NO

## 2024-08-09 SDOH — SOCIAL STABILITY: SOCIAL INSECURITY: ABUSE: ADULT

## 2024-08-09 ASSESSMENT — ENCOUNTER SYMPTOMS
EYES NEGATIVE: 1
FATIGUE: 1
GASTROINTESTINAL NEGATIVE: 1
NEUROLOGICAL NEGATIVE: 1
SHORTNESS OF BREATH: 1
ACTIVITY CHANGE: 1
CARDIOVASCULAR NEGATIVE: 1
APPETITE CHANGE: 1
WHEEZING: 1
ALLERGIC/IMMUNOLOGIC NEGATIVE: 1
ARTHRALGIAS: 1
ENDOCRINE NEGATIVE: 1
PSYCHIATRIC NEGATIVE: 1
HEMATOLOGIC/LYMPHATIC NEGATIVE: 1

## 2024-08-09 ASSESSMENT — PATIENT HEALTH QUESTIONNAIRE - PHQ9
SUM OF ALL RESPONSES TO PHQ9 QUESTIONS 1 & 2: 0
2. FEELING DOWN, DEPRESSED OR HOPELESS: NOT AT ALL
1. LITTLE INTEREST OR PLEASURE IN DOING THINGS: NOT AT ALL

## 2024-08-09 ASSESSMENT — PAIN SCALES - GENERAL
PAINLEVEL_OUTOF10: 0 - NO PAIN
PAINLEVEL_OUTOF10: 6
PAINLEVEL_OUTOF10: 0 - NO PAIN

## 2024-08-09 ASSESSMENT — COGNITIVE AND FUNCTIONAL STATUS - GENERAL
MOBILITY SCORE: 22
DAILY ACTIVITIY SCORE: 24
CLIMB 3 TO 5 STEPS WITH RAILING: A LITTLE
WALKING IN HOSPITAL ROOM: A LITTLE
PATIENT BASELINE BEDBOUND: NO

## 2024-08-09 ASSESSMENT — ACTIVITIES OF DAILY LIVING (ADL)
JUDGMENT_ADEQUATE_SAFELY_COMPLETE_DAILY_ACTIVITIES: YES
HEARING - LEFT EAR: FUNCTIONAL
DRESSING YOURSELF: INDEPENDENT
HEARING - RIGHT EAR: FUNCTIONAL
TOILETING: INDEPENDENT
LACK_OF_TRANSPORTATION: NO
WALKS IN HOME: INDEPENDENT
ASSISTIVE_DEVICE: EYEGLASSES;DENTURES LOWER;DENTURES UPPER;OXYGEN
FEEDING YOURSELF: INDEPENDENT
ADEQUATE_TO_COMPLETE_ADL: YES
PATIENT'S MEMORY ADEQUATE TO SAFELY COMPLETE DAILY ACTIVITIES?: YES
BATHING: INDEPENDENT
GROOMING: INDEPENDENT

## 2024-08-09 ASSESSMENT — PAIN DESCRIPTION - LOCATION: LOCATION: OTHER (COMMENT)

## 2024-08-09 ASSESSMENT — LIFESTYLE VARIABLES
AUDIT-C TOTAL SCORE: 1
AUDIT-C TOTAL SCORE: 1
HOW OFTEN DO YOU HAVE 6 OR MORE DRINKS ON ONE OCCASION: NEVER
SKIP TO QUESTIONS 9-10: 1
HOW OFTEN DO YOU HAVE A DRINK CONTAINING ALCOHOL: MONTHLY OR LESS
HOW MANY STANDARD DRINKS CONTAINING ALCOHOL DO YOU HAVE ON A TYPICAL DAY: 1 OR 2

## 2024-08-09 ASSESSMENT — PAIN - FUNCTIONAL ASSESSMENT
PAIN_FUNCTIONAL_ASSESSMENT: 0-10

## 2024-08-09 ASSESSMENT — PAIN DESCRIPTION - ORIENTATION: ORIENTATION: LEFT

## 2024-08-09 ASSESSMENT — PAIN DESCRIPTION - FREQUENCY: FREQUENCY: INTERMITTENT

## 2024-08-09 ASSESSMENT — PAIN DESCRIPTION - DESCRIPTORS: DESCRIPTORS: SHARP

## 2024-08-09 ASSESSMENT — PAIN DESCRIPTION - PAIN TYPE: TYPE: ACUTE PAIN

## 2024-08-09 NOTE — H&P
"History Of Present Illness  Brit Lopez \"Valorie\" is a 78 y.o. female presenting with past medical history of solitary kidney status post nephrectomy, myocardial infarction status post 2 cardiac stents, hypertension, rectal incontinence, hyperlipidemia, recent pneumonia,normocytic anemia, hypothyroidism, peripheral artery disease, obesity Presents with shortness of breath going on for the last 2 days.  She was initially wheezing and looked up her saturation with pulse oximetry found to have saturation less than 88%.  She wore her 's oxygen all night yesterday.  She went to the PCP and PCP asked her to go to the ER for further investigations.  No nausea, no vomiting.  No headache.  No chest pain.  No swelling in the legs.Patient has pain in her left side and she had fallen a few weeks ago.  She is unable to lay on her left side    On arrival in the ER Patient had a blood pressure of 142/61 mmHg, heart rate 82/min, respirate 17/min saturating 86% on room air.  She was placed on oxygen.  She was still hypoxic and placed on BiPAP and saturating well.  Initial lab investigation showed sodium 130, potassium 4.2, chloride 107, bicarb 24, anion gap 11, BUN 27, creatinine 1.55, calcium 9.4, ALP 52, bilirubin 1.4, , troponin 20, CBC was unremarkable.  ABG showed pH of 7.43, pCO2 34, pO2 84, urinalysis positive for infection CT chest was done shows    past Medical History  Past Medical History:   Diagnosis Date    Old myocardial infarction 04/06/2018    History of myocardial infarction    Personal history of malignant neoplasm of breast     History of malignant neoplasm of breast    Personal history of other diseases of the circulatory system 04/06/2018    History of hypertension    Personal history of other diseases of the female genital tract 04/06/2018    History of uterine prolapse    Personal history of other endocrine, nutritional and metabolic disease 04/06/2018    History of hyperlipidemia    Personal " history of other medical treatment     H/O mammogram       Surgical History  Past Surgical History:   Procedure Laterality Date    APPENDECTOMY  04/06/2018    Appendectomy    BI MAMMO BILATERAL SCREENING  08/30/2023    cat 2    CATARACT EXTRACTION Bilateral 02/2024    COLONOSCOPY  05/16/2018    DR MELGAR    INCISIONAL BREAST BIOPSY  04/06/2018    Incisional Breast Biopsy    OTHER SURGICAL HISTORY  04/06/2018    Breast Surgery Revision Of Reconstructed Right Breast    OTHER SURGICAL HISTORY  09/13/2021    Hip replacement    OTHER SURGICAL HISTORY  12/13/2019    Tubal ligation    OTHER SURGICAL HISTORY  12/13/2019    Kidney surgery    OTHER SURGICAL HISTORY  12/13/2019    Hysterectomy    OTHER SURGICAL HISTORY  12/13/2019    Cardiac catheterization with stent placement    OTHER SURGICAL HISTORY  12/13/2019    Cardiac catheterization    OTHER SURGICAL HISTORY  12/13/2019    Myringotomy with tube placement    OTHER SURGICAL HISTORY  2009    Right mastectomy        Social History  She reports that she has never smoked. She has never used smokeless tobacco. She reports current alcohol use. She reports that she does not use drugs.    Family History  Family History   Problem Relation Name Age of Onset    Hypertension Mother      Heart attack Mother      Heart disease Mother      Skin cancer Father      Heart attack Father      Kidney disease Father      Heart disease Father      Asthma Sister      Hypertension Daughter          Allergies  Amoxicillin-pot clavulanate, Citalopram, Hydralazine, Levofloxacin, Nitrofurantoin monohyd/m-cryst, Sulfa (sulfonamide antibiotics), and Thiazides    Review of Systems   Constitutional:  Positive for activity change, appetite change and fatigue.   HENT: Negative.     Eyes: Negative.    Respiratory:  Positive for shortness of breath and wheezing.    Cardiovascular: Negative.    Gastrointestinal: Negative.    Endocrine: Negative.    Genitourinary: Negative.    Musculoskeletal:  Positive  "for arthralgias.   Allergic/Immunologic: Negative.    Neurological: Negative.    Hematological: Negative.    Psychiatric/Behavioral: Negative.          Physical Exam  Constitutional:       Appearance: Normal appearance. She is obese.   HENT:      Head: Normocephalic.      Nose: Nose normal.      Mouth/Throat:      Mouth: Mucous membranes are dry.   Eyes:      Pupils: Pupils are equal, round, and reactive to light.   Cardiovascular:      Rate and Rhythm: Normal rate.      Pulses: Normal pulses.   Pulmonary:      Effort: Pulmonary effort is normal.   Abdominal:      Palpations: Abdomen is soft.   Musculoskeletal:         General: Normal range of motion.      Cervical back: Normal range of motion.   Neurological:      Mental Status: She is alert.          Last Recorded Vitals  Blood pressure 133/55, pulse 87, temperature 36.4 °C (97.5 °F), temperature source Temporal, resp. rate 22, height 1.626 m (5' 4\"), weight 94.5 kg (208 lb 5.4 oz), SpO2 93%.    Relevant Results      Scheduled medications  aspirin, 81 mg, oral, Daily  carvedilol, 25 mg, oral, BID  cefTRIAXone, 1 g, intravenous, Once  cholecalciferol, 5,000 Units, oral, Nightly  cilostazol, 50 mg, oral, BID  enoxaparin, 40 mg, subcutaneous, q24h  furosemide, 60 mg, intravenous, q12h  [Held by provider] furosemide, 40 mg, oral, Daily  ipratropium-albuteroL, 6 mL, nebulization, q6h  levothyroxine, 100 mcg, oral, Nightly  NIFEdipine ER, 90 mg, oral, Daily  polyethylene glycol, 17 g, oral, Daily  rosuvastatin, 20 mg, oral, Nightly  sodium bicarbonate, 1,300 mg, oral, BID  spironolactone, 25 mg, oral, Daily      Continuous medications     PRN medications  PRN medications: oxygen, oxygen         Assessment/Plan   78-year-old female with a past medical history of solitary kidney status post nephrectomy, myocardial infarction status post 2 cardiac stents, hypertension, rectal incontinence, hyperlipidemia, recent pneumonia,normocytic anemia, hypothyroidism, peripheral " artery disease, obesity Presents with shortness of breath going on for the last 2 days.  She presents with acute pulmonary edema.    Assessment  Acute hypoxic respiratory failure requiring BiPAP  Acute pulmonary edema  Rersistent hypertension   Possible hypertensive emergency causing pulmonary edema coronary artery disease s/p cardiac stents  Left-sided rib pain  Acute urinary tract infection  NSTEMI type II  Hyperlipidemia   Normocytic anemia  Hypothyroidism  Peripheral arterial disease  Obesity  Stage III CKD    Plan  Patient was placed on BiPAP and breathing well  BiPAP as needed for maintaining saturation greater than 92%  IV Lasix 60 mg twice daily  Continue home medications  Patient has pain on the left shoulder and ribs.  Will order rib x-rays and shoulder x-rays  Repeat troponins  As needed Tylenol for pain  Ceftriaxone IV for UTI  Nephrology consult    DVT prophylaxis: Lovenox  GI prophylaxis: Not required  Full code             I spent 60 minutes in the professional and overall care of this patient.      Dwayne Jacobs MD

## 2024-08-09 NOTE — ED PROVIDER NOTES
"HPI   Chief Complaint   Patient presents with    Rib Injury     Amb to ED from MD office with c/o left rib pain after fall 2 weeks ago and also here because of abnormal finding on CXR done 2 days ago--hx of pneumonia that has resolved--had episode of coughing last night that caused her oxygen sat to drop--she gave herself some aerosol tx and used her husbands oxygen the rest of the night       Patient presents from PCP office for abnormal chest x-ray.  Patient states she had pneumonia and she was at the office for follow-up care.  She states that her x-ray showed that the pneumonia was improved but there was a \"abnormality\" and she was directed to the ED.  Patient does admit to intermittent shortness of breath.  She is typically on CPAP but felt that she got more short of breath last night and wheezy.  She did a nebulizer treatment and then placed herself back on her 's oxygen which seemed to help.        History provided by:  Patient          Patient History   Past Medical History:   Diagnosis Date    Old myocardial infarction 04/06/2018    History of myocardial infarction    Personal history of malignant neoplasm of breast     History of malignant neoplasm of breast    Personal history of other diseases of the circulatory system 04/06/2018    History of hypertension    Personal history of other diseases of the female genital tract 04/06/2018    History of uterine prolapse    Personal history of other endocrine, nutritional and metabolic disease 04/06/2018    History of hyperlipidemia    Personal history of other medical treatment     H/O mammogram     Past Surgical History:   Procedure Laterality Date    APPENDECTOMY  04/06/2018    Appendectomy    BI MAMMO BILATERAL SCREENING  08/30/2023    cat 2    CATARACT EXTRACTION Bilateral 02/2024    COLONOSCOPY  05/16/2018    DR MELGAR    INCISIONAL BREAST BIOPSY  04/06/2018    Incisional Breast Biopsy    OTHER SURGICAL HISTORY  04/06/2018    Breast Surgery " Revision Of Reconstructed Right Breast    OTHER SURGICAL HISTORY  09/13/2021    Hip replacement    OTHER SURGICAL HISTORY  12/13/2019    Tubal ligation    OTHER SURGICAL HISTORY  12/13/2019    Kidney surgery    OTHER SURGICAL HISTORY  12/13/2019    Hysterectomy    OTHER SURGICAL HISTORY  12/13/2019    Cardiac catheterization with stent placement    OTHER SURGICAL HISTORY  12/13/2019    Cardiac catheterization    OTHER SURGICAL HISTORY  12/13/2019    Myringotomy with tube placement    OTHER SURGICAL HISTORY  2009    Right mastectomy     Family History   Problem Relation Name Age of Onset    Hypertension Mother      Heart attack Mother      Heart disease Mother      Skin cancer Father      Heart attack Father      Kidney disease Father      Heart disease Father      Asthma Sister      Hypertension Daughter       Social History     Tobacco Use    Smoking status: Never    Smokeless tobacco: Never   Vaping Use    Vaping status: Never Used   Substance Use Topics    Alcohol use: Yes    Drug use: Never       Physical Exam   ED Triage Vitals   Temperature Heart Rate Respirations BP   08/09/24 1130 08/09/24 1130 08/09/24 1130 08/09/24 1130   36.7 °C (98.1 °F) 82 17 142/61      Pulse Ox Temp src Heart Rate Source Patient Position   08/09/24 1130 -- 08/09/24 1130 08/09/24 1130   95 %  Monitor Lying      BP Location FiO2 (%)     08/09/24 1130 08/09/24 1243     Left arm 28 %       Physical Exam  Vitals and nursing note reviewed.   Constitutional:       General: She is not in acute distress.     Appearance: Normal appearance. She is well-developed and well-groomed. She is obese. She is not ill-appearing or toxic-appearing.   HENT:      Head: Normocephalic.      Right Ear: External ear normal.      Left Ear: External ear normal.      Nose: Nose normal.      Mouth/Throat:      Lips: Pink. No lesions.      Mouth: Mucous membranes are moist.   Eyes:      General: No scleral icterus.     Conjunctiva/sclera: Conjunctivae normal.   Neck:       Vascular: No JVD.   Cardiovascular:      Rate and Rhythm: Normal rate and regular rhythm.      Pulses:           Dorsalis pedis pulses are 2+ on the right side and 2+ on the left side.        Posterior tibial pulses are 2+ on the right side and 2+ on the left side.      Heart sounds: Normal heart sounds.   Pulmonary:      Effort: Pulmonary effort is normal.      Breath sounds: Normal air entry. Rhonchi present.   Chest:      Chest wall: Tenderness present.       Abdominal:      General: Bowel sounds are normal. There is no distension.      Palpations: Abdomen is soft.      Tenderness: There is no abdominal tenderness. There is no right CVA tenderness, left CVA tenderness or guarding.   Musculoskeletal:      Right lower leg: No edema.      Left lower leg: No edema.   Skin:     General: Skin is warm.      Capillary Refill: Capillary refill takes less than 2 seconds.   Neurological:      General: No focal deficit present.      Mental Status: She is alert and oriented to person, place, and time.      Cranial Nerves: No cranial nerve deficit or facial asymmetry.      Sensory: No sensory deficit.      Motor: No weakness.      Gait: Gait normal.   Psychiatric:         Attention and Perception: Attention and perception normal.         Mood and Affect: Mood normal. Affect is blunt and angry.         Behavior: Behavior is uncooperative.         Thought Content: Thought content normal.         Cognition and Memory: Cognition and memory normal.         Judgment: Judgment normal.           ED Course & MDM   Diagnoses as of 08/09/24 1435   Acute pulmonary edema (Multi)   Respiratory distress   Urinary tract infection with hematuria, site unspecified                 No data recorded     Jackie Coma Scale Score: 15 (08/09/24 1144 : Mirian Villalpando RN)                           Medical Decision Making  Patient presents from PCP office for abnormal chest x-ray.  Patient states she had pneumonia and she was at the office for  "follow-up care.  She states that her x-ray showed that the pneumonia was improved but there was a \"abnormality\" and she was directed to the ED.  Patient does admit to intermittent shortness of breath.  She is typically on CPAP but felt that she got more short of breath last night and wheezy.  She did a nebulizer treatment and then placed herself back on her 's oxygen which seemed to help.    Ddx: PE, mass, infection, cardiac, pulmonary, other    We will obtain labs and CT  Patient was otherwise stable throughout ED stay when she went to CT and had to lie flat she started to develop some respiratory distress.  Oxygen saturation dropped and patient started coughing with a moist cough.  Oxygenation dropped down in the 70s.  She was given 2 DuoNebs and placed on Ventimask.  She had slight improvement of her oxygenation but she continued to become more fatigued and increased work of breathing.  Therefore patient was placed on BiPAP.  Titrated to effect.  Patient improved significantly.  As we are uncertain if the patient possibly was having allergic reaction after going to CT patient was given Solu-Medrol Pepcid and Benadryl.  As on exam there was some wheezing that had occurred.  CT came back showing mostly pulmonary edema patient does not have a white count unlikely to be recurrent infection.  Patient given IV Lasix.  Consult to the hospitalist for admission with acceptance to the ICU on BiPAP.  Patient admitted in improved and stable condition    Problems Addressed:  Acute pulmonary edema (Multi): undiagnosed new problem with uncertain prognosis     Details: Given IV Lasix and on BiPAP  Respiratory distress: acute illness or injury     Details: Patient on BiPAP given DuoNebs and Lasix  Urinary tract infection with hematuria, site unspecified: acute illness or injury     Details: Patient refused IV Rocephin stating that her urine was just contaminated from fecal matter.    Amount and/or Complexity of Data " Reviewed  Labs: ordered. Decision-making details documented in ED Course.  Radiology: ordered and independent interpretation performed. Decision-making details documented in ED Course.  ECG/medicine tests: ordered and independent interpretation performed. Decision-making details documented in ED Course.     Details: By myself and attending showing normal sinus rhythm at a ventricular rate of 79 bpm.  Normal axis.  No ST segment elevation.  AR interval 150 with a QT of 390    Risk  Decision regarding hospitalization.  Diagnosis or treatment significantly limited by social determinants of health.        Procedure  Procedures     Luba Valerio PA-C  08/09/24 0791

## 2024-08-09 NOTE — PROGRESS NOTES
"Subjective   Patient ID: Valorie Lopez is a 78 y.o. female who presents for Follow-up (1 mo rev labs).    HPI had pneumonia and fu cxr 2 days ago is clear except for right sided diaphagmatic iissue, film not read yet    Had fell off four otoole 2 wks ago. Diff moving arm and ribs hrt  Was doing well until midnight last night with diff breathing , o2 was <90 and took 2 breathing tx and still dysoneic.  Still with o2 bleedi in to cpap and oxygen.  Given circumstances above described feel she needs ER evaluation to rule out PE, and likely needs rescan to evaluate the right hemidiaphragm.  Review of Systems  As above but no fever chills  Objective   /66   Pulse 84   Ht 1.6 m (5' 3\")   Wt 90.7 kg (200 lb)   SpO2 95%   BMI 35.43 kg/m²     Physical Exam  Heart regular.  Lungs are clear.  Assessment/Plan   Problem List Items Addressed This Visit    None  Visit Diagnoses         Codes    Pneumonia due to infectious organism, unspecified laterality, unspecified part of lung     J18.9    Hypoxemia associated with sleep     G47.36        Patient was sent to the emergency room.  ER doctor was called and history concerns relayed personally.       "

## 2024-08-09 NOTE — PROGRESS NOTES
Respiratory Therapy Note  pt. Taken off bipap for transfer to ICU , RT placed pt. On 8 L oxy-mask for tranport to icu. Rt took and set bipap up in rm 334 [ icu] pt. Comfortable and vitals stable on oxy- mask. Pt left on oxy-mask till  See pt.

## 2024-08-10 PROBLEM — J81.0 ACUTE PULMONARY EDEMA (MULTI): Status: ACTIVE | Noted: 2024-08-10

## 2024-08-10 LAB
ANION GAP SERPL CALC-SCNC: 19 MMOL/L (ref 10–20)
BUN SERPL-MCNC: 31 MG/DL (ref 6–23)
CALCIUM SERPL-MCNC: 8.8 MG/DL (ref 8.6–10.3)
CARDIAC TROPONIN I PNL SERPL HS: 23 NG/L (ref 0–13)
CHLORIDE SERPL-SCNC: 106 MMOL/L (ref 98–107)
CO2 SERPL-SCNC: 20 MMOL/L (ref 21–32)
CREAT SERPL-MCNC: 1.69 MG/DL (ref 0.5–1.05)
EGFRCR SERPLBLD CKD-EPI 2021: 31 ML/MIN/1.73M*2
ERYTHROCYTE [DISTWIDTH] IN BLOOD BY AUTOMATED COUNT: 13 % (ref 11.5–14.5)
GLUCOSE BLD MANUAL STRIP-MCNC: 130 MG/DL (ref 74–99)
GLUCOSE BLD MANUAL STRIP-MCNC: 175 MG/DL (ref 74–99)
GLUCOSE BLD MANUAL STRIP-MCNC: 212 MG/DL (ref 74–99)
GLUCOSE SERPL-MCNC: 246 MG/DL (ref 74–99)
HCT VFR BLD AUTO: 36.8 % (ref 36–46)
HGB BLD-MCNC: 12.1 G/DL (ref 12–16)
HOLD SPECIMEN: NORMAL
MCH RBC QN AUTO: 30.8 PG (ref 26–34)
MCHC RBC AUTO-ENTMCNC: 32.9 G/DL (ref 32–36)
MCV RBC AUTO: 94 FL (ref 80–100)
NRBC BLD-RTO: 0 /100 WBCS (ref 0–0)
PLATELET # BLD AUTO: 201 X10*3/UL (ref 150–450)
POTASSIUM SERPL-SCNC: 3.8 MMOL/L (ref 3.5–5.3)
RBC # BLD AUTO: 3.93 X10*6/UL (ref 4–5.2)
SODIUM SERPL-SCNC: 141 MMOL/L (ref 136–145)
WBC # BLD AUTO: 6.2 X10*3/UL (ref 4.4–11.3)

## 2024-08-10 PROCEDURE — 1100000001 HC PRIVATE ROOM DAILY

## 2024-08-10 PROCEDURE — 99291 CRITICAL CARE FIRST HOUR: CPT | Performed by: STUDENT IN AN ORGANIZED HEALTH CARE EDUCATION/TRAINING PROGRAM

## 2024-08-10 PROCEDURE — 80048 BASIC METABOLIC PNL TOTAL CA: CPT | Performed by: STUDENT IN AN ORGANIZED HEALTH CARE EDUCATION/TRAINING PROGRAM

## 2024-08-10 PROCEDURE — 2500000001 HC RX 250 WO HCPCS SELF ADMINISTERED DRUGS (ALT 637 FOR MEDICARE OP): Performed by: STUDENT IN AN ORGANIZED HEALTH CARE EDUCATION/TRAINING PROGRAM

## 2024-08-10 PROCEDURE — 94640 AIRWAY INHALATION TREATMENT: CPT

## 2024-08-10 PROCEDURE — 82947 ASSAY GLUCOSE BLOOD QUANT: CPT

## 2024-08-10 PROCEDURE — 36415 COLL VENOUS BLD VENIPUNCTURE: CPT | Performed by: STUDENT IN AN ORGANIZED HEALTH CARE EDUCATION/TRAINING PROGRAM

## 2024-08-10 PROCEDURE — 2500000004 HC RX 250 GENERAL PHARMACY W/ HCPCS (ALT 636 FOR OP/ED): Performed by: STUDENT IN AN ORGANIZED HEALTH CARE EDUCATION/TRAINING PROGRAM

## 2024-08-10 PROCEDURE — 85027 COMPLETE CBC AUTOMATED: CPT | Performed by: STUDENT IN AN ORGANIZED HEALTH CARE EDUCATION/TRAINING PROGRAM

## 2024-08-10 PROCEDURE — 94660 CPAP INITIATION&MGMT: CPT

## 2024-08-10 PROCEDURE — 2500000005 HC RX 250 GENERAL PHARMACY W/O HCPCS: Performed by: STUDENT IN AN ORGANIZED HEALTH CARE EDUCATION/TRAINING PROGRAM

## 2024-08-10 PROCEDURE — 84484 ASSAY OF TROPONIN QUANT: CPT | Performed by: STUDENT IN AN ORGANIZED HEALTH CARE EDUCATION/TRAINING PROGRAM

## 2024-08-10 PROCEDURE — 2500000002 HC RX 250 W HCPCS SELF ADMINISTERED DRUGS (ALT 637 FOR MEDICARE OP, ALT 636 FOR OP/ED): Performed by: STUDENT IN AN ORGANIZED HEALTH CARE EDUCATION/TRAINING PROGRAM

## 2024-08-10 RX ORDER — ACETAMINOPHEN 325 MG/1
650 TABLET ORAL EVERY 6 HOURS
Status: DISCONTINUED | OUTPATIENT
Start: 2024-08-10 | End: 2024-08-11 | Stop reason: HOSPADM

## 2024-08-10 RX ORDER — ACETAMINOPHEN 160 MG/5ML
650 SOLUTION ORAL EVERY 6 HOURS
Status: DISCONTINUED | OUTPATIENT
Start: 2024-08-10 | End: 2024-08-11 | Stop reason: HOSPADM

## 2024-08-10 RX ORDER — DEXTROSE 50 % IN WATER (D50W) INTRAVENOUS SYRINGE
12.5
Status: DISCONTINUED | OUTPATIENT
Start: 2024-08-10 | End: 2024-08-11 | Stop reason: HOSPADM

## 2024-08-10 RX ORDER — CEFTRIAXONE 1 G/50ML
1 INJECTION, SOLUTION INTRAVENOUS EVERY 24 HOURS
Status: DISCONTINUED | OUTPATIENT
Start: 2024-08-10 | End: 2024-08-11 | Stop reason: HOSPADM

## 2024-08-10 RX ORDER — INSULIN LISPRO 100 [IU]/ML
0-5 INJECTION, SOLUTION INTRAVENOUS; SUBCUTANEOUS
Status: DISCONTINUED | OUTPATIENT
Start: 2024-08-10 | End: 2024-08-11 | Stop reason: HOSPADM

## 2024-08-10 RX ORDER — FUROSEMIDE 10 MG/ML
40 INJECTION INTRAMUSCULAR; INTRAVENOUS EVERY 12 HOURS
Status: DISCONTINUED | OUTPATIENT
Start: 2024-08-10 | End: 2024-08-11 | Stop reason: HOSPADM

## 2024-08-10 RX ORDER — DEXTROSE 50 % IN WATER (D50W) INTRAVENOUS SYRINGE
25
Status: DISCONTINUED | OUTPATIENT
Start: 2024-08-10 | End: 2024-08-11 | Stop reason: HOSPADM

## 2024-08-10 RX ORDER — SODIUM BICARBONATE 325 MG/1
TABLET ORAL
Status: COMPLETED
Start: 2024-08-10 | End: 2024-08-10

## 2024-08-10 RX ORDER — ACETAMINOPHEN 650 MG/1
650 SUPPOSITORY RECTAL EVERY 6 HOURS
Status: DISCONTINUED | OUTPATIENT
Start: 2024-08-10 | End: 2024-08-11 | Stop reason: HOSPADM

## 2024-08-10 RX ORDER — ENOXAPARIN SODIUM 100 MG/ML
30 INJECTION SUBCUTANEOUS EVERY 24 HOURS
Status: DISCONTINUED | OUTPATIENT
Start: 2024-08-10 | End: 2024-08-11 | Stop reason: HOSPADM

## 2024-08-10 ASSESSMENT — PAIN SCALES - GENERAL
PAINLEVEL_OUTOF10: 0 - NO PAIN
PAINLEVEL_OUTOF10: 3
PAINLEVEL_OUTOF10: 1
PAINLEVEL_OUTOF10: 0 - NO PAIN

## 2024-08-10 ASSESSMENT — PAIN - FUNCTIONAL ASSESSMENT
PAIN_FUNCTIONAL_ASSESSMENT: 0-10

## 2024-08-10 ASSESSMENT — COGNITIVE AND FUNCTIONAL STATUS - GENERAL
MOBILITY SCORE: 23
CLIMB 3 TO 5 STEPS WITH RAILING: A LITTLE

## 2024-08-10 ASSESSMENT — PAIN DESCRIPTION - DESCRIPTORS
DESCRIPTORS: SHARP
DESCRIPTORS: DULL;ACHING

## 2024-08-10 NOTE — CARE PLAN
Problem: Pain  Goal: Takes deep breaths with improved pain control throughout the shift  Outcome: Progressing  Goal: Turns in bed with improved pain control throughout the shift  Outcome: Progressing  Goal: Performs ADL's with improved pain control throughout shift  Outcome: Progressing     Problem: Pain - Adult  Goal: Verbalizes/displays adequate comfort level or baseline comfort level  Outcome: Progressing     Problem: Safety - Adult  Goal: Free from fall injury  Outcome: Progressing     Problem: Discharge Planning  Goal: Discharge to home or other facility with appropriate resources  Outcome: Progressing     Problem: Chronic Conditions and Co-morbidities  Goal: Patient's chronic conditions and co-morbidity symptoms are monitored and maintained or improved  Outcome: Progressing     Problem: Nutrition  Goal: Oral intake greater than 50%  Outcome: Progressing  Goal: Adequate PO fluid intake  Outcome: Progressing     Problem: Fall/Injury  Goal: Not fall by end of shift  Outcome: Progressing  Goal: Be free from injury by end of the shift  Outcome: Progressing     Problem: Respiratory  Goal: Clear secretions with interventions this shift  Outcome: Progressing  Goal: Minimize anxiety/maximize coping throughout shift  Outcome: Progressing  Goal: Minimal/no exertional discomfort or dyspnea this shift  Outcome: Progressing  Goal: No signs of respiratory distress (eg. Use of accessory muscles. Peds grunting)  Outcome: Progressing  Goal: Patent airway maintained this shift  Outcome: Progressing  Goal: Tolerate mechanical ventilation evidenced by VS/agitation level this shift  Outcome: Progressing  Goal: Tolerate pulmonary toileting this shift  Outcome: Progressing  Goal: Verbalize decreased shortness of breath this shift  Outcome: Progressing  Goal: Wean oxygen to maintain O2 saturation per order/standard this shift  Outcome: Progressing  Goal: Increase self care and/or family involvement in next 24 hours  Outcome:  Progressing   The patient's goals for the shift include      The clinical goals for the shift include tolerate O2, wean O2    Pt wore 2L nasal cannula while awake and CPAP/BiPAP at night. VSS.

## 2024-08-10 NOTE — CARE PLAN
The patient's goals for the shift include  no left side muscle pain    The clinical goals for the shift include remain on room air by eos    Over the shift, the patient did not make progress toward the following goals. Barriers to progression include . Recommendations to address these barriers include .

## 2024-08-10 NOTE — PROGRESS NOTES
08/10/24 1511   Discharge Planning   Living Arrangements Spouse/significant other   Support Systems Spouse/significant other;Family members   Assistance Needed none   Type of Residence Private residence   Number of Stairs to Enter Residence 2   Number of Stairs Within Residence 0   Do you have animals or pets at home? No   Who is requesting discharge planning? Provider   Home or Post Acute Services None   Expected Discharge Disposition Home     SW met with patient and introduced self and the role we play in the discharge process. Confirmed her address and insurance. She does have Dr Sterling as PCP and [facility] both [facility] and [facility]. She is independent with all care at home and does not anticipate any needs at discharge.Reviewed in care rounds and anticipate discharge in 24-48 hours. 1 IMM today,8/10/24 at 1:20 pm. Plan is to return home when medically ready,CT to follow for any changes.   Patient resting in bed, breathing even and unlabored. Patient denies any needs at this time. Call light within reach.      Milena Gould RN  09/12/23 [id number]

## 2024-08-10 NOTE — PROGRESS NOTES
Valorie Lopez is a 78 y.o. female on day 0 of admission presenting with Acute hypoxic respiratory failure (Multi).      Subjective   Patient seen and examined at bedside. On bipap appears comfortable.  No complaints today.  Complains of rib pain         Objective     Last Recorded Vitals  /61   Pulse 82   Temp 36.2 °C (97.2 °F) (Temporal)   Resp (!) 8   Wt 91.3 kg (201 lb 4.5 oz)   SpO2 97%   Intake/Output last 3 Shifts:    Intake/Output Summary (Last 24 hours) at 8/10/2024 0749  Last data filed at 8/10/2024 0500  Gross per 24 hour   Intake 700 ml   Output 2675 ml   Net -1975 ml       Admission Weight  Weight: 90.7 kg (200 lb) (08/09/24 1130)    Daily Weight  08/10/24 : 91.3 kg (201 lb 4.5 oz)    Image Results  XR shoulder left 2+ views  Narrative: Interpreted By:  Schoenberger, Joseph,   STUDY:  XR SHOULDER LEFT 2+ VIEWS; ;  8/9/2024 7:05 pm      INDICATION:  Signs/Symptoms:pain.      COMPARISON:  None.      ACCESSION NUMBER(S):  [id number]      ORDERING CLINICIAN:  ZARI DESOUZA      FINDINGS:  Mild arthrosis in the acromioclavicular and glenohumeral joints. No  fracture or dislocation. Bones somewhat demineralized.      Impression: No acute findings          MACRO:  None      Signed by: Joseph Schoenberger 8/10/2024 7:16 AM  Dictation workstation:   [id number]  XR ribs left 2 views  Narrative: Interpreted By:  Schoenberger, Joseph,   STUDY:  XR RIBS LEFT 2 VIEWS; ;  8/9/2024 7:05 pm      INDICATION:  Signs/Symptoms:pain left side.      COMPARISON:  05/16/2024      ACCESSION NUMBER(S):  [id number]      ORDERING CLINICIAN:  ZARI DESOUZA      FINDINGS:  No pleural effusion or pneumothorax. Calcific pleural changes right  lower hemithorax. Bibasal fibrosis is likely. No acute left rib  fracture is noted. No rib lesion is seen. The all or bones somewhat  demineralized. Both the      Impression: No definite acute findings as discussed above          MACRO:  None      Signed by:  Joseph Schoenberger 8/10/2024 7:16 AM  Dictation workstation:   [id number]      Physical Exam  Physical Exam  Constitutional:       Appearance: Normal appearance. She is obese.   HENT:      Head: Normocephalic.      Nose: Nose normal.      Mouth/Throat:      Mouth: Mucous membranes are dry.   Eyes:      Pupils: Pupils are equal, round, and reactive to light.   Cardiovascular:      Rate and Rhythm: Normal rate.      Pulses: Normal pulses.   Pulmonary:      Effort: Pulmonary effort is normal. Rib pain+  Abdominal:      Palpations: Abdomen is soft.   Musculoskeletal:         General: Normal range of motion.      Cervical back: Normal range of motion.   Neurological:      Mental Status: She is alert.   Relevant Results           Scheduled medications  acetaminophen, 650 mg, oral, q6h   Or  acetaminophen, 650 mg, nasogastric tube, q6h   Or  acetaminophen, 650 mg, rectal, q6h  aspirin, 81 mg, oral, Daily  carvedilol, 25 mg, oral, BID  cefTRIAXone, 1 g, intravenous, Once  cholecalciferol, 5,000 Units, oral, Nightly  cilostazol, 50 mg, oral, BID  enoxaparin, 40 mg, subcutaneous, q24h  furosemide, 40 mg, intravenous, q12h  [Held by provider] furosemide, 40 mg, oral, Daily  insulin lispro, 0-5 Units, subcutaneous, TID  ipratropium-albuteroL, 3 mL, nebulization, q6h  levothyroxine, 100 mcg, oral, Nightly  NIFEdipine ER, 90 mg, oral, Daily  polyethylene glycol, 17 g, oral, Daily  rosuvastatin, 20 mg, oral, Nightly  sodium bicarbonate, 1,300 mg, oral, BID  spironolactone, 25 mg, oral, Daily      Continuous medications     PRN medications  PRN medications: dextrose, dextrose, glucagon, glucagon, oxygen, oxygen      Assessment/Plan      78-year-old female with a past medical history of solitary kidney status post nephrectomy, myocardial infarction status post 2 cardiac stents, hypertension, rectal incontinence, hyperlipidemia, recent pneumonia,normocytic anemia, hypothyroidism, peripheral artery disease, obesity Presents with  shortness of breath going on for the last 2 days.  She presents with acute pulmonary edema.     Assessment  Acute hypoxic respiratory failure requiring BiPAP  Acute pulmonary edema  Rersistent hypertension   Possible hypertensive emergency causing pulmonary edema coronary artery disease s/p cardiac stents  Left-sided rib pain  Acute urinary tract infection  NSTEMI type II  Hyperlipidemia   Normocytic anemia  Hypothyroidism  Peripheral arterial disease  Obesity  Stage III CKD        Plan  Patient's vitals stable  Continue BiPAP as needed.  She was weaned down to 2 L nasal cannula last.  Used BiPAP at night  Continue Lasix, will decrease to 40 mg twice daily  Will hold antihypertensive for SBP less than 120.  Will prioritize Lasix.  Nursing communicated  Blood creatinine is climbing up.  Will continue to monitor  Sugars are little bit high.  Will start her on sliding scale insulin  CBC is unremarkable  Shoulder and ribs reviewed, appears normal.  Will give her scheduled Tylenol for pain control troponins trending down  IV ceftriaxone for UTI day 2, follow urine cultures  Heart failure measures, strict input output  Fluid restriction to 1500 mL  2D echocardiogram ordered pending      DVT prophylaxis: Lovenox  GI prophylaxis: Not required  Full code                    I spent 35 minutes in the professional and overall care of this patient    Dwayne Jacobs MD

## 2024-08-11 VITALS
HEIGHT: 64 IN | OXYGEN SATURATION: 94 % | BODY MASS INDEX: 34.36 KG/M2 | DIASTOLIC BLOOD PRESSURE: 69 MMHG | RESPIRATION RATE: 16 BRPM | WEIGHT: 201.28 LBS | TEMPERATURE: 97.5 F | SYSTOLIC BLOOD PRESSURE: 112 MMHG | HEART RATE: 76 BPM

## 2024-08-11 LAB
ANION GAP SERPL CALC-SCNC: 12 MMOL/L (ref 10–20)
BACTERIA UR CULT: ABNORMAL
BUN SERPL-MCNC: 46 MG/DL (ref 6–23)
CALCIUM SERPL-MCNC: 9.5 MG/DL (ref 8.6–10.3)
CHLORIDE SERPL-SCNC: 105 MMOL/L (ref 98–107)
CO2 SERPL-SCNC: 26 MMOL/L (ref 21–32)
CREAT SERPL-MCNC: 1.74 MG/DL (ref 0.5–1.05)
EGFRCR SERPLBLD CKD-EPI 2021: 30 ML/MIN/1.73M*2
EST. AVERAGE GLUCOSE BLD GHB EST-MCNC: 131 MG/DL
GLUCOSE BLD MANUAL STRIP-MCNC: 133 MG/DL (ref 74–99)
GLUCOSE SERPL-MCNC: 144 MG/DL (ref 74–99)
HBA1C MFR BLD: 6.2 %
HOLD SPECIMEN: NORMAL
POTASSIUM SERPL-SCNC: 4.2 MMOL/L (ref 3.5–5.3)
SODIUM SERPL-SCNC: 139 MMOL/L (ref 136–145)

## 2024-08-11 PROCEDURE — 36415 COLL VENOUS BLD VENIPUNCTURE: CPT | Performed by: STUDENT IN AN ORGANIZED HEALTH CARE EDUCATION/TRAINING PROGRAM

## 2024-08-11 PROCEDURE — 99222 1ST HOSP IP/OBS MODERATE 55: CPT | Performed by: INTERNAL MEDICINE

## 2024-08-11 PROCEDURE — 2500000001 HC RX 250 WO HCPCS SELF ADMINISTERED DRUGS (ALT 637 FOR MEDICARE OP): Performed by: STUDENT IN AN ORGANIZED HEALTH CARE EDUCATION/TRAINING PROGRAM

## 2024-08-11 PROCEDURE — 82947 ASSAY GLUCOSE BLOOD QUANT: CPT

## 2024-08-11 PROCEDURE — 94640 AIRWAY INHALATION TREATMENT: CPT

## 2024-08-11 PROCEDURE — 2500000005 HC RX 250 GENERAL PHARMACY W/O HCPCS: Performed by: STUDENT IN AN ORGANIZED HEALTH CARE EDUCATION/TRAINING PROGRAM

## 2024-08-11 PROCEDURE — 83036 HEMOGLOBIN GLYCOSYLATED A1C: CPT | Mod: SAMLAB | Performed by: STUDENT IN AN ORGANIZED HEALTH CARE EDUCATION/TRAINING PROGRAM

## 2024-08-11 PROCEDURE — 2500000002 HC RX 250 W HCPCS SELF ADMINISTERED DRUGS (ALT 637 FOR MEDICARE OP, ALT 636 FOR OP/ED): Performed by: STUDENT IN AN ORGANIZED HEALTH CARE EDUCATION/TRAINING PROGRAM

## 2024-08-11 PROCEDURE — 80048 BASIC METABOLIC PNL TOTAL CA: CPT | Performed by: STUDENT IN AN ORGANIZED HEALTH CARE EDUCATION/TRAINING PROGRAM

## 2024-08-11 PROCEDURE — 99239 HOSP IP/OBS DSCHRG MGMT >30: CPT | Performed by: STUDENT IN AN ORGANIZED HEALTH CARE EDUCATION/TRAINING PROGRAM

## 2024-08-11 PROCEDURE — 94660 CPAP INITIATION&MGMT: CPT

## 2024-08-11 RX ORDER — ACETAMINOPHEN 325 MG/1
650 TABLET ORAL EVERY 6 HOURS PRN
Qty: 30 TABLET | Refills: 0 | Status: SHIPPED | OUTPATIENT
Start: 2024-08-11 | End: 2024-08-15

## 2024-08-11 ASSESSMENT — COGNITIVE AND FUNCTIONAL STATUS - GENERAL
MOBILITY SCORE: 22
CLIMB 3 TO 5 STEPS WITH RAILING: A LITTLE
DAILY ACTIVITIY SCORE: 24
WALKING IN HOSPITAL ROOM: A LITTLE

## 2024-08-11 ASSESSMENT — PAIN SCALES - GENERAL: PAINLEVEL_OUTOF10: 0 - NO PAIN

## 2024-08-11 NOTE — CARE PLAN
The patient's goals for the shift include      The clinical goals for the shift include remain on room air by eos    Over the shift, the patient remained on room air until wearing c pap at HS.  Pt rested well throughout night.

## 2024-08-11 NOTE — NURSING NOTE
Discharge instructions reviewed with patient, she verbalized understanding of follow up appointments. IV removed, no complications. Patient will be leaving ambulatory into private vehicle with spouse.

## 2024-08-11 NOTE — CARE PLAN
The patient's goals for the shift include  discharged to home by EOS     The clinical goals for the shift include remain on room air throughout shift

## 2024-08-11 NOTE — CONSULTS
Reason For Consult  Chronic kidney disease    History Of Present Illness  Valorie Lopez is a 78 y.o. female presenting with shortness of breath.   She presented to the emergency room secondary to shortness of breath.  Her shortness of breath has been going on for couple of days.  She was outside with her  working around their property and they had an accident that she fell off the [address] and fell on her side.  She fell on her left side and was having pain with breathing and then this also translated over to the right side.  She feels like she thinks that this really exacerbated her breathing issues and was the reason for these issues  This a.m. she is resting comfortably in bed  She states that her chest is feeling much improved.  She is pretty much feeling back to her normal self.  No shortness of breath currently  Through the night she did wear her CPAP which is normal for her  Past Medical History  She has a past medical history of Old myocardial infarction (04/06/2018), Personal history of malignant neoplasm of breast, Personal history of other diseases of the circulatory system (04/06/2018), Personal history of other diseases of the female genital tract (04/06/2018), Personal history of other endocrine, nutritional and metabolic disease (04/06/2018), and Personal history of other medical treatment.    Surgical History  She has a past surgical history that includes Incisional breast biopsy (04/06/2018); Other surgical history (04/06/2018); Appendectomy (04/06/2018); Other surgical history (09/13/2021); Colonoscopy (05/16/2018); Other surgical history (12/13/2019); Other surgical history (12/13/2019); Other surgical history (12/13/2019); Other surgical history (12/13/2019); Other surgical history (12/13/2019); Other surgical history (12/13/2019); Other surgical history (2009); Cataract extraction (Bilateral, 02/2024); and BI mammo bilateral screening (08/30/2023).     Social History  She reports that  she has never smoked. She has never used smokeless tobacco. She reports current alcohol use. She reports that she does not use drugs.    Family History  Family History   Problem Relation Name Age of Onset    Hypertension Mother      Heart attack Mother      Heart disease Mother      Skin cancer Father      Heart attack Father      Kidney disease Father      Heart disease Father      Asthma Sister      Hypertension Daughter          Allergies  Amoxicillin-pot clavulanate, Citalopram, Hydralazine, Levofloxacin, Nitrofurantoin monohyd/m-cryst, Sulfa (sulfonamide antibiotics), and Thiazides    Review of Systems  A full 10 point review of systems was obtained is negative except HPI as above     Physical Exam  Physical Exam  Constitutional:       Appearance: Normal appearance. She is obese.   HENT:      Head: Normocephalic and atraumatic.      Right Ear: External ear normal.      Left Ear: External ear normal.      Nose: Nose normal.      Mouth/Throat:      Mouth: Mucous membranes are moist.      Pharynx: Oropharynx is clear.   Eyes:      Extraocular Movements: Extraocular movements intact.      Conjunctiva/sclera: Conjunctivae normal.      Pupils: Pupils are equal, round, and reactive to light.   Cardiovascular:      Rate and Rhythm: Normal rate and regular rhythm.   Pulmonary:      Effort: Pulmonary effort is normal.      Breath sounds: Normal breath sounds.   Abdominal:      General: Abdomen is flat.      Palpations: Abdomen is soft.   Skin:     General: Skin is warm and dry.   Neurological:      General: No focal deficit present.      Mental Status: She is alert and oriented to person, place, and time.   Psychiatric:         Mood and Affect: Mood normal.         Behavior: Behavior normal.                 I&O 24HR    Intake/Output Summary (Last 24 hours) at 8/11/2024 0751  Last data filed at 8/10/2024 2100  Gross per 24 hour   Intake 1710 ml   Output 1850 ml   Net -140 ml       Vitals 24HR  Heart Rate:  [76-93]   Temp:   [36 °C (96.8 °F)-36.6 °C (97.9 °F)]   Resp:  [14-20]   BP: (105-138)/(42-70)   Weight:  [91.3 kg (201 lb 4.5 oz)]   SpO2:  [94 %-100 %]         Relevant Results  Results reviewed     Assessment/Plan       Chronic kidney disease Stage IIIb/IV: With baseline creatinine 1.5-1.8  Resistant hypertension: On Nifediine, Spironolactone, Coreg   Normocytic anemia   Obstructive sleep apnea with use of CPAP  Coronary artery disease with 3 stents placed in past  Hypothyroidism  Hyperlipidemia  Peripheral arterial disease  Obesity  Vitamin D Deficiency  R Renal Atrophy with GFR of 18% and Nephrectomy on 2/1/2019    Plan:  At this time from my standpoint she appears to be at her baseline  Her respiratory status has improved and she denies any shortness of breath  Renal function is at baseline  From my standpoint she can be discharged back to home  It appears that likely her acute issues were likely exacerbated by her fall and pain.  She has an appointment to follow-up with me as an outpatient  Please call with any further issues or needs    Principal Problem:    Acute hypoxic respiratory failure (Multi)  Active Problems:    Acute pulmonary edema (Multi)        Vincent Cain, DO

## 2024-08-11 NOTE — DISCHARGE INSTRUCTIONS
"Acute respiratory distress syndrome in adults - Discharge instructions    The Basics  Written by the doctors and editors at [facility]  What are discharge instructions? -- Discharge instructions are information about how to take care of yourself after getting medical care for a health problem.  What is acute respiratory distress syndrome? -- Acute respiratory distress syndrome (\"ARDS\") is a serious lung condition. It happens when the lungs get inflamed and fluid builds up. A buildup of fluid in the lungs can cause problems because it can keep oxygen from getting into the blood. Then, the organs in the body do not get as much oxygen as they need. This requires treatment in the hospital, usually in the intensive care unit (\"ICU\").  People who had to stay in the ICU because of ARDS are usually seriously ill. This can be hard on the body. After having ARDS, people often have lung problems and muscle weakness that can last for weeks or even months. They might also have thinking problems, which can happen if the brain doesn't get enough oxygen.  After leaving the ICU, some people can go home. Others need to stay in a \"long-term care facility\" or other place that provides special medical care.  How do I care for myself at home? -- Ask the doctor or nurse what you should do when you go home. Make sure that you understand exactly what you need to do to care for yourself. Ask questions if there is anything you do not understand.  You should also:  ? Take all of your medicines as instructed.  ? Make sure that you know how to use your oxygen equipment, if went you home with it.  ? Do your breathing exercises as instructed, if your doctor told you to. These can help strengthen your lungs.  ? Avoid smoking and places where other people are smoking. If you are having trouble quitting smoking, your doctor or nurse can help.  ? Avoid going outside in very cold or very hot weather. Do not go outside when the air quality is bad.  ? " "Increase physical activity slowly. Moving your body is good for your health, but your muscles might be weak as you recover. Some people need physical therapy or something called \"pulmonary rehab\" to help them recover.  ? Talk to your doctor about when you can return to work or other activities.  ? Protect yourself from infections. Wash your hands often, and stay away from people who are sick. It's also important to get all of the vaccines your doctor recommends.  What follow-up care do I need? -- Your doctor or nurse will tell you when to come back for a follow-up appointment. This will involve getting X-rays and other tests to check your lungs.  When should I call the doctor? -- Call for advice if:  ? You start having trouble breathing again.  ? You have signs of infection, such as a fever, chills, or cough.  ? You feel dizzy or lightheaded.  ? You have new pain or swelling in your legs.  ? You have symptoms of anxiety or depression.    "

## 2024-08-11 NOTE — DISCHARGE SUMMARY
Discharge Diagnosis  Acute hypoxic respiratory failure (Multi)    Issues Requiring Follow-Up  Rib pain, shoulder pain, nephrology follow-up    Discharge Meds     Your medication list        START taking these medications        Instructions Last Dose Given Next Dose Due   acetaminophen 325 mg tablet  Commonly known as: Tylenol      Take 2 tablets (650 mg) by mouth every 6 hours if needed for mild pain (1 - 3) for up to 4 days.       oxygen gas therapy  Commonly known as: O2      Inhale 1 each once every 24 hours.              CONTINUE taking these medications        Instructions Last Dose Given Next Dose Due   alpha lipoic acid 300 mg capsule           aspirin 81 mg EC tablet           carvedilol 25 mg tablet  Commonly known as: Coreg      Take 1 tablet (25 mg) by mouth 2 times a day.       cilostazol 50 mg tablet  Commonly known as: Pletal      Take 1 tablet (50 mg) by mouth 2 times a day.       CRANBERRY ORAL           estradiol 0.01 % (0.1 mg/gram) vaginal cream  Commonly known as: Estrace      Insert a pea sized amount into the vagina twice weekly       furosemide 40 mg tablet  Commonly known as: Lasix      Take 1 tablet (40 mg) by mouth once daily.       ipratropium-albuteroL 0.5-2.5 mg/3 mL nebulizer solution  Commonly known as: Duo-Neb      Take 3 mL by nebulization every 4 hours if needed for wheezing.       levothyroxine 100 mcg tablet  Commonly known as: Synthroid, Levoxyl      TAKE 1 TABLET BY MOUTH ONCE  DAILY       NIFEdipine ER 90 mg 24 hr tablet  Commonly known as: Adalat CC      Take 1 tablet (90 mg) by mouth once daily.       OMEGA 3-6-9 ORAL           rosuvastatin 20 mg tablet  Commonly known as: Crestor      TAKE 1 TABLET BY MOUTH ONCE  DAILY       sodium bicarbonate 650 mg tablet      TAKE 2 TABLETS BY MOUTH TWICE DAILY       spironolactone 25 mg tablet  Commonly known as: Aldactone      TAKE 1 TABLET BY MOUTH DAILY       VENTOLIN INHL           Vitamin D3 5,000 Units tablet  Generic drug:  "cholecalciferol                     Where to Get Your Medications        These medications were sent to [facility] - [place], [place] - [address]  [address], [facility] [address]      Phone: [phone]   acetaminophen 325 mg tablet       Information about where to get these medications is not yet available    Ask your nurse or doctor about these medications  oxygen gas therapy         Test Results Pending At Discharge  Pending Labs       Order Current Status    Hemoglobin A1c In process    Urine Culture Preliminary result            Hospital Course  Brit Lopez \"Valorie\" is a 78 y.o. female presenting with past medical history of solitary kidney status post nephrectomy, myocardial infarction status post 2 cardiac stents, hypertension, rectal incontinence, hyperlipidemia, recent pneumonia,normocytic anemia, hypothyroidism, peripheral artery disease, obesity Presents with shortness of breath going on for the last 2 days.  She was initially wheezing and looked up her saturation with pulse oximetry found to have saturation less than 88%.  She wore her 's oxygen all night yesterday.  She went to the PCP and PCP asked her to go to the ER for further investigations.  No nausea, no vomiting.  No headache.  No chest pain.  No swelling in the legs.Patient has pain in her left side and she had fallen a few weeks ago.  She is unable to lay on her left side     On arrival in the ER Patient had a blood pressure of 142/61 mmHg, heart rate 82/min, respirate 17/min saturating 86% on room air.  She was placed on oxygen.  She was still hypoxic and placed on BiPAP and saturating well.  Initial lab investigation showed sodium 130, potassium 4.2, chloride 107, bicarb 24, anion gap 11, BUN 27, creatinine 1.55, calcium 9.4, ALP 52, bilirubin 1.4, , troponin 20, CBC was unremarkable.  ABG showed pH of 7.43, pCO2 34, pO2 84, urinalysis positive for infection CT chest was done " shows  During the course in the hospital patient was started on BiPAP, patient got better symptomatically and was switched to nasal cannula/Ventimask within few hours.  She tolerated nasal cannula on 2 L and was back to baseline in few hours.  Patient says she has oxygen at home and has been using on and off.  Advised her to use oxygen all time.  Patient was given IV Lasix twice and she has not no diuresis.  Her breathing was better.  She also had left-sided rib pain and shoulder pain x-rays was done but negative.  Pain improved symptomatically with Tylenol.  Patient had UTI but refused antibiotics at this time because he said it is going on chronically and her urinalysis always shows infection.  Her gynecologist has advised her against antibiotics at this time.  She also refused insulin    Patient vitals remained stable during this hospitalization  She was discharged home in satisfactory condition    Pertinent Physical Exam At Time of Discharge  Physical Exam  Physical Exam  Physical Exam  Constitutional:       Appearance: Normal appearance. She is obese.   HENT:      Head: Normocephalic.      Nose: Nose normal.      Mouth/Throat:      Mouth: Mucous membranes are dry.   Eyes:      Pupils: Pupils are equal, round, and reactive to light.   Cardiovascular:      Rate and Rhythm: Normal rate.      Pulses: Normal pulses.   Pulmonary:      Effort: Pulmonary effort is normal. Rib pain improved   Abdominal:      Palpations: Abdomen is soft.   Musculoskeletal:         General: Normal range of motion.      Cervical back: Normal range of motion.   Neurological:      Mental Status: She is alert.   Relevant Results     Outpatient Follow-Up  Future Appointments   Date Time Provider Department Center   [facility]   9/10/2024 10:00 AM Rhina Chandra MD [id number] [facility]   9/11/2024 10:40 AM Álvaro Sterling MD [id number] None   10/7/2024  1:30 PM Tr Piña DO [id number] [place]    10/31/2024 10:00 AM Vincent Cain DO [id number] [facility]   12/5/2024 10:00 AM CELIA Kim-CNP, DNP [id number] [facility]   4/23/2025 11:30 AM Margaret Hanley MD [facility]         Dwayne Jacobs MD

## 2024-08-12 ENCOUNTER — DOCUMENTATION (OUTPATIENT)
Dept: CARE COORDINATION | Facility: CLINIC | Age: 79
End: 2024-08-12
Payer: MEDICARE

## 2024-08-12 ENCOUNTER — PATIENT OUTREACH (OUTPATIENT)
Dept: CARE COORDINATION | Facility: CLINIC | Age: 79
End: 2024-08-12
Payer: MEDICARE

## 2024-08-12 NOTE — PROGRESS NOTES
Discharge Facility:[facility]  Discharge Diagnosis:Acute hypoxic respiratory failure  Admission Date:8/9/24  Discharge Date: 8/11/24    PCP Appointment Date:Task sent to office  Specialist Appointment Date: Nephrology-TBD  Hospital Encounter and Summary Linked: Yes  See discharge assessment below for further details     Medications  Medications reviewed with patient/caregiver?: Yes (8/12/2024 12:01 PM)  Is the patient having any side effects they believe may be caused by any medication additions or changes?: No (8/12/2024 12:01 PM)  Does the patient have all medications ordered at discharge?: Yes (8/12/2024 12:01 PM)  Care Management Interventions: Provided patient education (8/12/2024 12:01 PM)  Prescription Comments: Tylenol, O2 (8/12/2024 12:01 PM)  Is the patient taking all medications as directed (includes completed medication regime)?: Yes (8/12/2024 12:01 PM)  Care Management Interventions: Provided patient education (8/12/2024 12:01 PM)  Medication Comments: Patient has discharged medications (8/12/2024 12:01 PM)    Appointments  Does the patient have a primary care provider?: Yes (8/12/2024 12:01 PM)  Care Management Interventions: Advised patient to make appointment (8/12/2024 12:01 PM)  Has the patient kept scheduled appointments due by today?: Yes (8/12/2024 12:01 PM)  Care Management Interventions: Advised to reschedule appointment (8/12/2024 12:01 PM)    Self Management  What is the home health agency?: N/A (8/12/2024 12:01 PM)  Has home health visited the patient within 72 hours of discharge?: Not applicable (8/12/2024 12:01 PM)  What Durable Medical Equipment (DME) was ordered?: N/A (8/12/2024 12:01 PM)    Patient Teaching  Does the patient have access to their discharge instructions?: Yes (8/12/2024 12:01 PM)  Care Management Interventions: Reviewed instructions with patient (8/12/2024 12:01 PM)  What is the patient's perception of their health status since discharge?: Improving (8/12/2024 12:01 PM)  Is  the patient/caregiver able to teach back the hierarchy of who to call/visit for symptoms/problems? PCP, Specialist, Home Health nurse, Urgent Care, ED, 911: Yes (8/12/2024 12:01 PM)

## 2024-08-25 ENCOUNTER — HOSPITAL ENCOUNTER (INPATIENT)
Facility: HOSPITAL | Age: 79
LOS: 3 days | Discharge: HOME | DRG: 280 | End: 2024-08-28
Attending: INTERNAL MEDICINE | Admitting: INTERNAL MEDICINE
Payer: MEDICARE

## 2024-08-25 ENCOUNTER — APPOINTMENT (OUTPATIENT)
Dept: RADIOLOGY | Facility: HOSPITAL | Age: 79
DRG: 280 | End: 2024-08-25
Payer: MEDICARE

## 2024-08-25 DIAGNOSIS — N18.32 STAGE 3B CHRONIC KIDNEY DISEASE (MULTI): ICD-10-CM

## 2024-08-25 DIAGNOSIS — I50.9 CONGESTIVE HEART FAILURE, UNSPECIFIED HF CHRONICITY, UNSPECIFIED HEART FAILURE TYPE (MULTI): Primary | ICD-10-CM

## 2024-08-25 DIAGNOSIS — I50.20 UNSPECIFIED SYSTOLIC (CONGESTIVE) HEART FAILURE (MULTI): ICD-10-CM

## 2024-08-25 DIAGNOSIS — J96.00 ACUTE RESPIRATORY FAILURE, UNSPECIFIED WHETHER WITH HYPOXIA OR HYPERCAPNIA (MULTI): ICD-10-CM

## 2024-08-25 LAB
ALBUMIN SERPL BCP-MCNC: 3.7 G/DL (ref 3.4–5)
ALP SERPL-CCNC: 41 U/L (ref 33–136)
ALT SERPL W P-5'-P-CCNC: 17 U/L (ref 7–45)
ANION GAP SERPL CALC-SCNC: 13 MMOL/L (ref 10–20)
AST SERPL W P-5'-P-CCNC: 23 U/L (ref 9–39)
BASE EXCESS BLDA CALC-SCNC: -0.3 MMOL/L (ref -2–3)
BILIRUB SERPL-MCNC: 1.8 MG/DL (ref 0–1.2)
BNP SERPL-MCNC: 458 PG/ML (ref 0–99)
BODY TEMPERATURE: 37 DEGREES CELSIUS
BUN SERPL-MCNC: 22 MG/DL (ref 6–23)
CALCIUM SERPL-MCNC: 9.3 MG/DL (ref 8.6–10.3)
CARDIAC TROPONIN I PNL SERPL HS: 22 NG/L (ref 0–13)
CHLORIDE SERPL-SCNC: 105 MMOL/L (ref 98–107)
CO2 SERPL-SCNC: 22 MMOL/L (ref 21–32)
CREAT SERPL-MCNC: 1.49 MG/DL (ref 0.5–1.05)
EGFRCR SERPLBLD CKD-EPI 2021: 36 ML/MIN/1.73M*2
ERYTHROCYTE [DISTWIDTH] IN BLOOD BY AUTOMATED COUNT: 13 % (ref 11.5–14.5)
GLUCOSE SERPL-MCNC: 155 MG/DL (ref 74–99)
HCO3 BLDA-SCNC: 21.5 MMOL/L (ref 22–26)
HCT VFR BLD AUTO: 34.3 % (ref 36–46)
HGB BLD-MCNC: 11.6 G/DL (ref 12–16)
HOLD SPECIMEN: NORMAL
INHALED O2 CONCENTRATION: 28 %
MCH RBC QN AUTO: 31.1 PG (ref 26–34)
MCHC RBC AUTO-ENTMCNC: 33.8 G/DL (ref 32–36)
MCV RBC AUTO: 92 FL (ref 80–100)
NRBC BLD-RTO: 0 /100 WBCS (ref 0–0)
OXYHGB MFR BLDA: 93.3 % (ref 94–98)
PCO2 BLDA: 27 MM HG (ref 38–42)
PH BLDA: 7.51 PH (ref 7.38–7.42)
PLATELET # BLD AUTO: 205 X10*3/UL (ref 150–450)
PO2 BLDA: 68 MM HG (ref 85–95)
POTASSIUM SERPL-SCNC: 4.1 MMOL/L (ref 3.5–5.3)
PROT SERPL-MCNC: 6.5 G/DL (ref 6.4–8.2)
RBC # BLD AUTO: 3.73 X10*6/UL (ref 4–5.2)
SAO2 % BLDA: 97 % (ref 94–100)
SODIUM SERPL-SCNC: 136 MMOL/L (ref 136–145)
WBC # BLD AUTO: 10 X10*3/UL (ref 4.4–11.3)

## 2024-08-25 PROCEDURE — 2500000004 HC RX 250 GENERAL PHARMACY W/ HCPCS (ALT 636 FOR OP/ED): Performed by: INTERNAL MEDICINE

## 2024-08-25 PROCEDURE — 84484 ASSAY OF TROPONIN QUANT: CPT | Performed by: PHYSICIAN ASSISTANT

## 2024-08-25 PROCEDURE — 2500000001 HC RX 250 WO HCPCS SELF ADMINISTERED DRUGS (ALT 637 FOR MEDICARE OP): Performed by: INTERNAL MEDICINE

## 2024-08-25 PROCEDURE — 94762 N-INVAS EAR/PLS OXIMTRY CONT: CPT

## 2024-08-25 PROCEDURE — 2500000005 HC RX 250 GENERAL PHARMACY W/O HCPCS: Performed by: PHYSICIAN ASSISTANT

## 2024-08-25 PROCEDURE — 85027 COMPLETE CBC AUTOMATED: CPT | Performed by: PHYSICIAN ASSISTANT

## 2024-08-25 PROCEDURE — 9420000001 HC RT PATIENT EDUCATION 5 MIN

## 2024-08-25 PROCEDURE — 36600 WITHDRAWAL OF ARTERIAL BLOOD: CPT

## 2024-08-25 PROCEDURE — 94799 UNLISTED PULMONARY SVC/PX: CPT

## 2024-08-25 PROCEDURE — 2500000001 HC RX 250 WO HCPCS SELF ADMINISTERED DRUGS (ALT 637 FOR MEDICARE OP)

## 2024-08-25 PROCEDURE — 94640 AIRWAY INHALATION TREATMENT: CPT

## 2024-08-25 PROCEDURE — 71045 X-RAY EXAM CHEST 1 VIEW: CPT

## 2024-08-25 PROCEDURE — 94660 CPAP INITIATION&MGMT: CPT

## 2024-08-25 PROCEDURE — 2020000001 HC ICU ROOM DAILY

## 2024-08-25 PROCEDURE — 2500000004 HC RX 250 GENERAL PHARMACY W/ HCPCS (ALT 636 FOR OP/ED): Performed by: PHYSICIAN ASSISTANT

## 2024-08-25 PROCEDURE — 83880 ASSAY OF NATRIURETIC PEPTIDE: CPT | Performed by: PHYSICIAN ASSISTANT

## 2024-08-25 PROCEDURE — 99285 EMERGENCY DEPT VISIT HI MDM: CPT | Mod: 25

## 2024-08-25 PROCEDURE — 82805 BLOOD GASES W/O2 SATURATION: CPT | Performed by: PHYSICIAN ASSISTANT

## 2024-08-25 PROCEDURE — 2500000005 HC RX 250 GENERAL PHARMACY W/O HCPCS: Performed by: INTERNAL MEDICINE

## 2024-08-25 PROCEDURE — 5A09357 ASSISTANCE WITH RESPIRATORY VENTILATION, LESS THAN 24 CONSECUTIVE HOURS, CONTINUOUS POSITIVE AIRWAY PRESSURE: ICD-10-PCS | Performed by: INTERNAL MEDICINE

## 2024-08-25 PROCEDURE — 2500000002 HC RX 250 W HCPCS SELF ADMINISTERED DRUGS (ALT 637 FOR MEDICARE OP, ALT 636 FOR OP/ED): Performed by: INTERNAL MEDICINE

## 2024-08-25 PROCEDURE — 36415 COLL VENOUS BLD VENIPUNCTURE: CPT | Performed by: PHYSICIAN ASSISTANT

## 2024-08-25 PROCEDURE — 99291 CRITICAL CARE FIRST HOUR: CPT | Performed by: INTERNAL MEDICINE

## 2024-08-25 PROCEDURE — 96374 THER/PROPH/DIAG INJ IV PUSH: CPT

## 2024-08-25 PROCEDURE — 71045 X-RAY EXAM CHEST 1 VIEW: CPT | Performed by: RADIOLOGY

## 2024-08-25 PROCEDURE — 80053 COMPREHEN METABOLIC PANEL: CPT | Performed by: PHYSICIAN ASSISTANT

## 2024-08-25 PROCEDURE — 94664 DEMO&/EVAL PT USE INHALER: CPT

## 2024-08-25 RX ORDER — CILOSTAZOL 50 MG/1
50 TABLET ORAL 2 TIMES DAILY
Status: DISCONTINUED | OUTPATIENT
Start: 2024-08-25 | End: 2024-08-28 | Stop reason: HOSPADM

## 2024-08-25 RX ORDER — LEVOTHYROXINE SODIUM 100 UG/1
100 TABLET ORAL DAILY
Status: DISCONTINUED | OUTPATIENT
Start: 2024-08-25 | End: 2024-08-28 | Stop reason: HOSPADM

## 2024-08-25 RX ORDER — ACETAMINOPHEN 160 MG/5ML
650 SOLUTION ORAL EVERY 4 HOURS PRN
Status: DISCONTINUED | OUTPATIENT
Start: 2024-08-25 | End: 2024-08-28 | Stop reason: HOSPADM

## 2024-08-25 RX ORDER — MAGNESIUM HYDROXIDE 2400 MG/10ML
10 SUSPENSION ORAL DAILY PRN
Status: DISCONTINUED | OUTPATIENT
Start: 2024-08-25 | End: 2024-08-28 | Stop reason: HOSPADM

## 2024-08-25 RX ORDER — IPRATROPIUM BROMIDE AND ALBUTEROL SULFATE 2.5; .5 MG/3ML; MG/3ML
3 SOLUTION RESPIRATORY (INHALATION) 4 TIMES DAILY PRN
Status: DISCONTINUED | OUTPATIENT
Start: 2024-08-25 | End: 2024-08-28 | Stop reason: HOSPADM

## 2024-08-25 RX ORDER — ONDANSETRON HYDROCHLORIDE 2 MG/ML
4 INJECTION, SOLUTION INTRAVENOUS EVERY 8 HOURS PRN
Status: DISCONTINUED | OUTPATIENT
Start: 2024-08-25 | End: 2024-08-28 | Stop reason: HOSPADM

## 2024-08-25 RX ORDER — ASPIRIN 81 MG/1
81 TABLET ORAL DAILY
Status: DISCONTINUED | OUTPATIENT
Start: 2024-08-26 | End: 2024-08-28 | Stop reason: HOSPADM

## 2024-08-25 RX ORDER — FUROSEMIDE 10 MG/ML
40 INJECTION INTRAMUSCULAR; INTRAVENOUS EVERY 12 HOURS
Status: DISCONTINUED | OUTPATIENT
Start: 2024-08-26 | End: 2024-08-28

## 2024-08-25 RX ORDER — ONDANSETRON 4 MG/1
4 TABLET, ORALLY DISINTEGRATING ORAL EVERY 8 HOURS PRN
Status: DISCONTINUED | OUTPATIENT
Start: 2024-08-25 | End: 2024-08-28 | Stop reason: HOSPADM

## 2024-08-25 RX ORDER — ACETAMINOPHEN 650 MG/1
650 SUPPOSITORY RECTAL EVERY 4 HOURS PRN
Status: DISCONTINUED | OUTPATIENT
Start: 2024-08-25 | End: 2024-08-28 | Stop reason: HOSPADM

## 2024-08-25 RX ORDER — FUROSEMIDE 10 MG/ML
40 INJECTION INTRAMUSCULAR; INTRAVENOUS ONCE
Status: COMPLETED | OUTPATIENT
Start: 2024-08-25 | End: 2024-08-25

## 2024-08-25 RX ORDER — HEPARIN SODIUM 5000 [USP'U]/ML
5000 INJECTION, SOLUTION INTRAVENOUS; SUBCUTANEOUS EVERY 8 HOURS
Status: DISCONTINUED | OUTPATIENT
Start: 2024-08-25 | End: 2024-08-28 | Stop reason: HOSPADM

## 2024-08-25 RX ORDER — CILOSTAZOL 50 MG/1
TABLET ORAL
Status: COMPLETED
Start: 2024-08-25 | End: 2024-08-25

## 2024-08-25 RX ORDER — CARVEDILOL 25 MG/1
25 TABLET ORAL 2 TIMES DAILY
Status: DISCONTINUED | OUTPATIENT
Start: 2024-08-25 | End: 2024-08-28 | Stop reason: HOSPADM

## 2024-08-25 RX ORDER — ACETAMINOPHEN 325 MG/1
650 TABLET ORAL EVERY 4 HOURS PRN
Status: DISCONTINUED | OUTPATIENT
Start: 2024-08-25 | End: 2024-08-28 | Stop reason: HOSPADM

## 2024-08-25 SDOH — SOCIAL STABILITY: SOCIAL INSECURITY: HAS ANYONE EVER THREATENED TO HURT YOUR FAMILY OR YOUR PETS?: NO

## 2024-08-25 SDOH — SOCIAL STABILITY: SOCIAL INSECURITY: ABUSE: ADULT

## 2024-08-25 SDOH — SOCIAL STABILITY: SOCIAL INSECURITY: ARE YOU OR HAVE YOU BEEN THREATENED OR ABUSED PHYSICALLY, EMOTIONALLY, OR SEXUALLY BY ANYONE?: NO

## 2024-08-25 SDOH — SOCIAL STABILITY: SOCIAL INSECURITY: DOES ANYONE TRY TO KEEP YOU FROM HAVING/CONTACTING OTHER FRIENDS OR DOING THINGS OUTSIDE YOUR HOME?: NO

## 2024-08-25 SDOH — SOCIAL STABILITY: SOCIAL INSECURITY: HAVE YOU HAD ANY THOUGHTS OF HARMING ANYONE ELSE?: NO

## 2024-08-25 SDOH — SOCIAL STABILITY: SOCIAL INSECURITY: HAVE YOU HAD THOUGHTS OF HARMING ANYONE ELSE?: NO

## 2024-08-25 SDOH — SOCIAL STABILITY: SOCIAL INSECURITY: DO YOU FEEL ANYONE HAS EXPLOITED OR TAKEN ADVANTAGE OF YOU FINANCIALLY OR OF YOUR PERSONAL PROPERTY?: NO

## 2024-08-25 SDOH — SOCIAL STABILITY: SOCIAL INSECURITY: DO YOU FEEL UNSAFE GOING BACK TO THE PLACE WHERE YOU ARE LIVING?: NO

## 2024-08-25 SDOH — SOCIAL STABILITY: SOCIAL INSECURITY: ARE THERE ANY APPARENT SIGNS OF INJURIES/BEHAVIORS THAT COULD BE RELATED TO ABUSE/NEGLECT?: NO

## 2024-08-25 ASSESSMENT — ACTIVITIES OF DAILY LIVING (ADL)
ASSISTIVE_DEVICE: DENTURES LOWER;DENTURES UPPER;EYEGLASSES;OXYGEN
HEARING - RIGHT EAR: FUNCTIONAL
LACK_OF_TRANSPORTATION: NO
HEARING - LEFT EAR: FUNCTIONAL
BATHING: INDEPENDENT
ADEQUATE_TO_COMPLETE_ADL: YES
WALKS IN HOME: INDEPENDENT
FEEDING YOURSELF: INDEPENDENT
PATIENT'S MEMORY ADEQUATE TO SAFELY COMPLETE DAILY ACTIVITIES?: YES
JUDGMENT_ADEQUATE_SAFELY_COMPLETE_DAILY_ACTIVITIES: YES
GROOMING: INDEPENDENT
TOILETING: INDEPENDENT
DRESSING YOURSELF: INDEPENDENT

## 2024-08-25 ASSESSMENT — COGNITIVE AND FUNCTIONAL STATUS - GENERAL
MOBILITY SCORE: 24
PATIENT BASELINE BEDBOUND: NO
DAILY ACTIVITIY SCORE: 24

## 2024-08-25 ASSESSMENT — COLUMBIA-SUICIDE SEVERITY RATING SCALE - C-SSRS
1. IN THE PAST MONTH, HAVE YOU WISHED YOU WERE DEAD OR WISHED YOU COULD GO TO SLEEP AND NOT WAKE UP?: NO
2. HAVE YOU ACTUALLY HAD ANY THOUGHTS OF KILLING YOURSELF?: NO
2. HAVE YOU ACTUALLY HAD ANY THOUGHTS OF KILLING YOURSELF?: NO
6. HAVE YOU EVER DONE ANYTHING, STARTED TO DO ANYTHING, OR PREPARED TO DO ANYTHING TO END YOUR LIFE?: NO
6. HAVE YOU EVER DONE ANYTHING, STARTED TO DO ANYTHING, OR PREPARED TO DO ANYTHING TO END YOUR LIFE?: NO
1. IN THE PAST MONTH, HAVE YOU WISHED YOU WERE DEAD OR WISHED YOU COULD GO TO SLEEP AND NOT WAKE UP?: NO

## 2024-08-25 ASSESSMENT — PAIN - FUNCTIONAL ASSESSMENT
PAIN_FUNCTIONAL_ASSESSMENT: 0-10
PAIN_FUNCTIONAL_ASSESSMENT: 0-10

## 2024-08-25 ASSESSMENT — LIFESTYLE VARIABLES
AUDIT-C TOTAL SCORE: 1
HOW OFTEN DO YOU HAVE A DRINK CONTAINING ALCOHOL: MONTHLY OR LESS
HOW OFTEN DO YOU HAVE 6 OR MORE DRINKS ON ONE OCCASION: NEVER
HOW MANY STANDARD DRINKS CONTAINING ALCOHOL DO YOU HAVE ON A TYPICAL DAY: 1 OR 2
AUDIT-C TOTAL SCORE: 1
SKIP TO QUESTIONS 9-10: 1

## 2024-08-25 ASSESSMENT — PAIN SCALES - GENERAL
PAINLEVEL_OUTOF10: 0 - NO PAIN
PAINLEVEL_OUTOF10: 0 - NO PAIN

## 2024-08-26 LAB
ALBUMIN SERPL BCP-MCNC: 3.5 G/DL (ref 3.4–5)
ALP SERPL-CCNC: 37 U/L (ref 33–136)
ALT SERPL W P-5'-P-CCNC: 15 U/L (ref 7–45)
ANION GAP SERPL CALC-SCNC: 12 MMOL/L (ref 10–20)
AST SERPL W P-5'-P-CCNC: 18 U/L (ref 9–39)
ATRIAL RATE: 89 BPM
BILIRUB SERPL-MCNC: 1.6 MG/DL (ref 0–1.2)
BUN SERPL-MCNC: 24 MG/DL (ref 6–23)
CALCIUM SERPL-MCNC: 8.9 MG/DL (ref 8.6–10.3)
CHLORIDE SERPL-SCNC: 106 MMOL/L (ref 98–107)
CO2 SERPL-SCNC: 24 MMOL/L (ref 21–32)
CREAT SERPL-MCNC: 1.43 MG/DL (ref 0.5–1.05)
EGFRCR SERPLBLD CKD-EPI 2021: 37 ML/MIN/1.73M*2
GLUCOSE SERPL-MCNC: 129 MG/DL (ref 74–99)
HOLD SPECIMEN: NORMAL
P AXIS: 70 DEGREES
P OFFSET: 205 MS
P ONSET: 146 MS
POTASSIUM SERPL-SCNC: 3.7 MMOL/L (ref 3.5–5.3)
PR INTERVAL: 144 MS
PROT SERPL-MCNC: 6.1 G/DL (ref 6.4–8.2)
Q ONSET: 218 MS
QRS COUNT: 15 BEATS
QRS DURATION: 92 MS
QT INTERVAL: 366 MS
QTC CALCULATION(BAZETT): 445 MS
QTC FREDERICIA: 417 MS
R AXIS: 64 DEGREES
SODIUM SERPL-SCNC: 138 MMOL/L (ref 136–145)
T AXIS: 111 DEGREES
T OFFSET: 401 MS
T4 FREE SERPL-MCNC: 1.15 NG/DL (ref 0.61–1.12)
TSH SERPL-ACNC: 0.2 MIU/L (ref 0.44–3.98)
VENTRICULAR RATE: 89 BPM

## 2024-08-26 PROCEDURE — 2500000002 HC RX 250 W HCPCS SELF ADMINISTERED DRUGS (ALT 637 FOR MEDICARE OP, ALT 636 FOR OP/ED): Performed by: INTERNAL MEDICINE

## 2024-08-26 PROCEDURE — 99222 1ST HOSP IP/OBS MODERATE 55: CPT | Performed by: INTERNAL MEDICINE

## 2024-08-26 PROCEDURE — 84439 ASSAY OF FREE THYROXINE: CPT | Performed by: INTERNAL MEDICINE

## 2024-08-26 PROCEDURE — 2500000001 HC RX 250 WO HCPCS SELF ADMINISTERED DRUGS (ALT 637 FOR MEDICARE OP): Performed by: INTERNAL MEDICINE

## 2024-08-26 PROCEDURE — 2020000001 HC ICU ROOM DAILY

## 2024-08-26 PROCEDURE — 2500000004 HC RX 250 GENERAL PHARMACY W/ HCPCS (ALT 636 FOR OP/ED): Performed by: INTERNAL MEDICINE

## 2024-08-26 PROCEDURE — 94762 N-INVAS EAR/PLS OXIMTRY CONT: CPT

## 2024-08-26 PROCEDURE — 99291 CRITICAL CARE FIRST HOUR: CPT | Performed by: STUDENT IN AN ORGANIZED HEALTH CARE EDUCATION/TRAINING PROGRAM

## 2024-08-26 PROCEDURE — 94660 CPAP INITIATION&MGMT: CPT

## 2024-08-26 PROCEDURE — 84443 ASSAY THYROID STIM HORMONE: CPT | Performed by: INTERNAL MEDICINE

## 2024-08-26 PROCEDURE — 99239 HOSP IP/OBS DSCHRG MGMT >30: CPT | Performed by: STUDENT IN AN ORGANIZED HEALTH CARE EDUCATION/TRAINING PROGRAM

## 2024-08-26 PROCEDURE — 94640 AIRWAY INHALATION TREATMENT: CPT

## 2024-08-26 PROCEDURE — 2500000005 HC RX 250 GENERAL PHARMACY W/O HCPCS: Performed by: INTERNAL MEDICINE

## 2024-08-26 PROCEDURE — 80053 COMPREHEN METABOLIC PANEL: CPT | Performed by: INTERNAL MEDICINE

## 2024-08-26 PROCEDURE — 36415 COLL VENOUS BLD VENIPUNCTURE: CPT | Performed by: INTERNAL MEDICINE

## 2024-08-26 RX ORDER — DAPAGLIFLOZIN 10 MG/1
10 TABLET, FILM COATED ORAL EVERY 24 HOURS
Status: DISCONTINUED | OUTPATIENT
Start: 2024-08-26 | End: 2024-08-28 | Stop reason: HOSPADM

## 2024-08-26 ASSESSMENT — COGNITIVE AND FUNCTIONAL STATUS - GENERAL
MOBILITY SCORE: 23
CLIMB 3 TO 5 STEPS WITH RAILING: A LITTLE
DAILY ACTIVITIY SCORE: 24

## 2024-08-26 ASSESSMENT — PAIN - FUNCTIONAL ASSESSMENT
PAIN_FUNCTIONAL_ASSESSMENT: 0-10

## 2024-08-26 ASSESSMENT — PAIN SCALES - GENERAL
PAINLEVEL_OUTOF10: 0 - NO PAIN

## 2024-08-26 ASSESSMENT — ACTIVITIES OF DAILY LIVING (ADL): LACK_OF_TRANSPORTATION: NO

## 2024-08-26 NOTE — H&P
"History Of Present Illness  Brit Lopez \"Valorie\" is a 78 y.o. female  Who presented to the emergency room for shortness of breath and wheezing.  On presentation, blood pressure 129/47, heart rate 97, respiratory rate 20, afebrile, saturation oxygen 88% on room air.  Pertinent findings on blood workup; hemoglobin 11.6, , creatinine 1.49, and bilirubin 1.8.  Chest x-ray showed cardiomegaly with pulmonary vascular congestive changes and edema.  Patient was given in the emergency room IV Lasix and placed on the BiPAP and admitted to the medical service for further investigation and management.  Upon encounter patient is awake and alert.  She has the BiPAP on.  It was difficult to get more history from her.  She said that she started experiencing progressively worsening shortness of breath and wheezing 3 days ago.  Denies having any new cough.  No fever or chills.  No leg edema.  No orthopnea or paroxysmal nocturnal dyspnea.  No chest pain or chest pressure.  Patient was hospitalized around 2 weeks ago for a pneumonia.  She was discharged after that on nighttime oxygen.    ROS  10 systems were reviewed and were negative except for those noted in the history of present illness.    Past Medical History  Past Medical History:   Diagnosis Date    Old myocardial infarction 04/06/2018    History of myocardial infarction    Personal history of malignant neoplasm of breast     History of malignant neoplasm of breast    Personal history of other diseases of the circulatory system 04/06/2018    History of hypertension    Personal history of other diseases of the female genital tract 04/06/2018    History of uterine prolapse    Personal history of other endocrine, nutritional and metabolic disease 04/06/2018    History of hyperlipidemia    Personal history of other medical treatment     H/O mammogram     Pertinent medical history also documented in my below narrative    Surgical History  Past Surgical History: "   Procedure Laterality Date    APPENDECTOMY  04/06/2018    Appendectomy    BI MAMMO BILATERAL SCREENING  08/30/2023    cat 2    CATARACT EXTRACTION Bilateral 02/2024    COLONOSCOPY  05/16/2018    DR MELGAR    INCISIONAL BREAST BIOPSY  04/06/2018    Incisional Breast Biopsy    OTHER SURGICAL HISTORY  04/06/2018    Breast Surgery Revision Of Reconstructed Right Breast    OTHER SURGICAL HISTORY  09/13/2021    Hip replacement    OTHER SURGICAL HISTORY  12/13/2019    Tubal ligation    OTHER SURGICAL HISTORY  12/13/2019    Kidney surgery    OTHER SURGICAL HISTORY  12/13/2019    Hysterectomy    OTHER SURGICAL HISTORY  12/13/2019    Cardiac catheterization with stent placement    OTHER SURGICAL HISTORY  12/13/2019    Cardiac catheterization    OTHER SURGICAL HISTORY  12/13/2019    Myringotomy with tube placement    OTHER SURGICAL HISTORY  2009    Right mastectomy      Pertinent surgical history also documented in my below narrative    Social History  She reports that she has never smoked. She has never used smokeless tobacco. She reports current alcohol use. She reports that she does not use drugs.    Family History  Family History   Problem Relation Name Age of Onset    Hypertension Mother      Heart attack Mother      Heart disease Mother      Skin cancer Father      Heart attack Father      Kidney disease Father      Heart disease Father      Asthma Sister      Hypertension Daughter          Allergies  Amoxicillin-pot clavulanate, Citalopram, Hydralazine, Levofloxacin, Nitrofurantoin monohyd/m-cryst, Sulfa (sulfonamide antibiotics), and Thiazides    Medications Prior to Admission   Medication Sig Dispense Refill Last Dose    albuterol sulfate (VENTOLIN INHL) Inhale. PRN ( HER HUSBANDS INHALER), SHE USED ONCE       alpha lipoic acid 300 mg capsule Take 1 capsule by mouth once daily.       aspirin 81 mg EC tablet Take 1 tablet (81 mg) by mouth once daily.       carvedilol (Coreg) 25 mg tablet Take 1 tablet (25 mg) by  "mouth 2 times a day. 180 tablet 3     cholecalciferol (Vitamin D3) 5,000 Units tablet Take 1 tablet (5,000 Units) by mouth once daily.       cilostazol (Pletal) 50 mg tablet Take 1 tablet (50 mg) by mouth 2 times a day. 90 tablet 3     CRANBERRY ORAL Take 1 capsule by mouth once daily.       estradiol (Estrace) 0.01 % (0.1 mg/gram) vaginal cream Insert a pea sized amount into the vagina twice weekly 42.5 g 2     fish oil/borage/flax/om3,6,9 1 (OMEGA 3-6-9 ORAL) Take 1 tablet by mouth once daily.       furosemide (Lasix) 40 mg tablet Take 1 tablet (40 mg) by mouth once daily. 30 tablet 11     ipratropium-albuteroL (Duo-Neb) 0.5-2.5 mg/3 mL nebulizer solution Take 3 mL by nebulization every 4 hours if needed for wheezing. 120 mL 1     levothyroxine (Synthroid, Levoxyl) 100 mcg tablet TAKE 1 TABLET BY MOUTH ONCE  DAILY 90 tablet 3     NIFEdipine XL 90 mg 24 hr tablet Take 1 tablet (90 mg) by mouth once daily. 90 tablet 3     oxygen (O2) gas therapy Inhale 1 each once every 24 hours.       rosuvastatin (Crestor) 20 mg tablet TAKE 1 TABLET BY MOUTH ONCE  DAILY 90 tablet 3     sodium bicarbonate 650 mg tablet TAKE 2 TABLETS BY MOUTH TWICE DAILY 360 tablet 3     spironolactone (Aldactone) 25 mg tablet TAKE 1 TABLET BY MOUTH DAILY 90 tablet 3         Last Recorded Vitals  Blood pressure 136/64, pulse 95, temperature 37.7 °C (99.8 °F), temperature source Oral, resp. rate (!) 30, height 1.702 m (5' 7\"), weight 91.2 kg (201 lb), SpO2 95%.    Physical Exam  Constitutional:       General: She is not in acute distress.     Appearance: She is obese. She is ill-appearing.      Comments: Awake alert and oriented x3   HENT:      Mouth/Throat:      Pharynx: Oropharynx is clear.   Eyes:      Pupils: Pupils are equal, round, and reactive to light.   Cardiovascular:      Rate and Rhythm: Normal rate and regular rhythm.      Heart sounds: Normal heart sounds.   Pulmonary:      Effort: Respiratory distress present.      Breath sounds: " Wheezing present. No rhonchi.      Comments: Bilateral lower lobes coarse crackles  Abdominal:      General: Abdomen is flat. Bowel sounds are normal. There is no distension.      Palpations: Abdomen is soft.      Tenderness: There is no abdominal tenderness.   Musculoskeletal:         General: No swelling.   Skin:     General: Skin is warm.   Neurological:      General: No focal deficit present.   Psychiatric:         Mood and Affect: Mood normal.         Behavior: Behavior normal.         Thought Content: Thought content normal.         Judgment: Judgment normal.           Relevant Results  Results for orders placed or performed during the hospital encounter of 08/25/24 (from the past 24 hour(s))   BLOOD GAS ARTERIAL   Result Value Ref Range    POCT pH, Arterial 7.51 (H) 7.38 - 7.42 pH    POCT pCO2, Arterial 27 (L) 38 - 42 mm Hg    POCT pO2, Arterial 68 (L) 85 - 95 mm Hg    POCT SO2, Arterial 97 94 - 100 %    POCT Oxy Hemoglobin, Arterial 93.3 (L) 94.0 - 98.0 %    POCT Base Excess, Arterial -0.3 -2.0 - 3.0 mmol/L    POCT HCO3 Calculated, Arterial 21.5 (L) 22.0 - 26.0 mmol/L    Patient Temperature 37.0 degrees Celsius    FiO2 28 %   Comprehensive metabolic panel   Result Value Ref Range    Glucose 155 (H) 74 - 99 mg/dL    Sodium 136 136 - 145 mmol/L    Potassium 4.1 3.5 - 5.3 mmol/L    Chloride 105 98 - 107 mmol/L    Bicarbonate 22 21 - 32 mmol/L    Anion Gap 13 10 - 20 mmol/L    Urea Nitrogen 22 6 - 23 mg/dL    Creatinine 1.49 (H) 0.50 - 1.05 mg/dL    eGFR 36 (L) >60 mL/min/1.73m*2    Calcium 9.3 8.6 - 10.3 mg/dL    Albumin 3.7 3.4 - 5.0 g/dL    Alkaline Phosphatase 41 33 - 136 U/L    Total Protein 6.5 6.4 - 8.2 g/dL    AST 23 9 - 39 U/L    Bilirubin, Total 1.8 (H) 0.0 - 1.2 mg/dL    ALT 17 7 - 45 U/L   Troponin I, High Sensitivity   Result Value Ref Range    Troponin I, High Sensitivity 22 (H) 0 - 13 ng/L   B-Type Natriuretic Peptide   Result Value Ref Range     (H) 0 - 99 pg/mL   CBC   Result Value Ref  Range    WBC 10.0 4.4 - 11.3 x10*3/uL    nRBC 0.0 0.0 - 0.0 /100 WBCs    RBC 3.73 (L) 4.00 - 5.20 x10*6/uL    Hemoglobin 11.6 (L) 12.0 - 16.0 g/dL    Hematocrit 34.3 (L) 36.0 - 46.0 %    MCV 92 80 - 100 fL    MCH 31.1 26.0 - 34.0 pg    MCHC 33.8 32.0 - 36.0 g/dL    RDW 13.0 11.5 - 14.5 %    Platelets 205 150 - 450 x10*3/uL   Light Blue Top   Result Value Ref Range    Extra Tube Hold for add-ons.    SST TOP   Result Value Ref Range    Extra Tube Hold for add-ons.    Gray Top   Result Value Ref Range    Extra Tube Hold for add-ons.         XR chest 1 view    Result Date: 8/25/2024  Interpreted By:  Hermann Contreras, STUDY: XR CHEST 1 VIEW;  8/25/2024 8:47 pm   INDICATION: Signs/Symptoms:SOB.   COMPARISON: 8/7/2024   ACCESSION NUMBER(S): [id number]   ORDERING CLINICIAN: TEREZA JOE   FINDINGS: There is cardiomegaly there is pulmonary vascular congestive change and edema. Small pleural effusions and basilar atelectasis airspace disease. No pneumothorax.       Cardiomegaly with pulmonary edema and small pleural effusions and basilar atelectasis or airspace disease.   MACRO: None   Signed by: Hermann Contreras 8/25/2024 9:25 PM Dictation workstation:   [id number]       Assessment/Plan     78-year-old obese female with a past medical history of moderate aortic stenosis, diastolic congestive heart failure, moderate mitral valve regurgitation, chronic kidney disease with a baseline creatinine around 1.5, recent pneumonia with residual nocturnal hypoxia, coronary artery disease/MI, hypertension, rectal incontinence, hyperlipidemia, anemia, hypothyroidism, solitary kidney and peripheral artery disease who presented to the emergency room for 3 days history of worsening shortness of breath and wheezing.  Patient was found to have an acute CHF exacerbation in the setting of acute pulmonary edema and associated with an acute hypoxemic respiratory failure.    Since patient is now on the BiPAP, I will admit to the intensive care unit  with telemetry and vital signs monitoring.  I will consult cardiology given the underlying aortic stenosis and give Lasix 40 mg IV twice a day.  Monitor ins and outs closely.  Repeat BMP tomorrow.  Continue home medications  While reviewing the patient's chart, I noticed her TSH level on 2/26/2024 was 0.39.  So I will order a repeat TSH level with free T4.  Patient has a slightly elevated bilirubin level.  Etiology still unclear.  Could be from the right-sided heart failure.  Will monitor levels for now.  SCDs and heparin for DVT prophylaxis  Patient is full code    I have reviewed and evaluated the most recent data and results, personally examined the patient, and formulated the plan of care as presented above.  Patient is at-risk for clinically significant deterioration / failure due to the above mentioned dysfunctional, unstable organ systems and requires continued critical care treatment. I have personally identified and managed all complex critical care issues to prevent aforementioned clinical deterioration.      60 minutes were spent in the critical care management of the patient excluding billable procedures        (This note was generated with voice recognition software and may contain errors including spelling, grammar, syntax and misrecognition of what was dictated, that are not fully corrected)     Thiago Muir MD

## 2024-08-26 NOTE — CONSULTS
Inpatient consult to Cardiology  Consult performed by: Jett Thao MD  Consult ordered by: Thiago Muir MD  Reason for consult: CHF      History Of Present Illness:    Mrs. Valorie Lopez is a 78 y.o. non-smoker female being consulted by the Cardiology team for CHF. Patient with prior medical history significant for malignant neoplasm of breast, moderate aortic stenosis, diastolic congestive heart failure, moderate mitral valve regurgitation, chronic kidney disease with a baseline creatinine around 1.5, recent pneumonia with residual nocturnal hypoxia, coronary artery disease/MI, prior myocardial infarction (04/06/2018), CAD S/P 3 cardiac stent placement, hypertension, rectal incontinence, hyperlipidemia, anemia, hypothyroidism, solitary kidney and peripheral artery disease. She presented to ED [facility] on 08/25/2024 complaining of 3 days history of worsening shortness of breath and wheezing. Patient reported progressively worsening shortness of breath and wheezing for the past 3 days. She was found to have an acute CHF exacerbation in the setting of acute pulmonary edema and associated with an acute hypoxemic respiratory failure. On presentation, blood pressure 129/47, heart rate 97bpm, respiratory rate 20, afebrile, saturation oxygen 88% on room air. Pertinent findings on blood workup; hemoglobin 11.6, , creatinine 1.49, and bilirubin 1.8. Chest x-ray showed cardiomegaly with pulmonary vascular congestive changes and edema. EKG shows normal sinus rhythm with no signs of acute ischemic changes. Trop 22. Patient was given in the emergency room IV Lasix and placed on the BiPAP and admitted for clinical compensation.     Last Recorded Vitals:  Vitals:    08/26/24 1129 08/26/24 1200 08/26/24 1300 08/26/24 1333   BP: 135/57 128/58 115/55    BP Location: Left arm      Patient Position: Sitting      Pulse: 76 77 74    Resp: 23 17 15    Temp: 36.2 °C (97.2 °F)      TempSrc: Temporal     "  SpO2: 94% 94% 92% (!) 89%   Weight:       Height:           Last Labs:  CBC - 8/25/2024:  8:44 PM  10.0 11.6 205    34.3      CMP - 8/26/2024:  4:29 AM  8.9 6.1 18 --- 1.6   2.3 3.5 15 37      PTT - No results in last year.  _   _ _     Troponin I, High Sensitivity   Date/Time Value Ref Range Status   08/25/2024 08:44 PM 22 (H) 0 - 13 ng/L Final   08/10/2024 04:28 AM 23 (H) 0 - 13 ng/L Final   08/09/2024 06:16 PM 43 (H) 0 - 13 ng/L Final     BNP   Date/Time Value Ref Range Status   08/25/2024 08:44  (H) 0 - 99 pg/mL Final   08/09/2024 02:30  (H) 0 - 99 pg/mL Final     Hemoglobin A1C   Date/Time Value Ref Range Status   08/11/2024 04:39 AM 6.2 (H) see below % Final   02/26/2024 11:28 AM 6.1 (H) see below % Final     LDL Calculated   Date/Time Value Ref Range Status   06/03/2024 11:30 AM 85 <=99 mg/dL Final     Comment:                                 Near   Borderline      AGE      Desirable  Optimal    High     High     Very High     0-19 Y     0 - 109     ---    110-129   >/= 130     ----    20-24 Y     0 - 119     ---    120-159   >/= 160     ----      >24 Y     0 -  99   100-129  130-159   160-189     >/=190       VLDL   Date/Time Value Ref Range Status   06/03/2024 11:30 AM 41 (H) 0 - 40 mg/dL Final   12/12/2020 08:55 AM 52 (H) 0 - 40 mg/dL Final      Last I/O:  I/O last 3 completed shifts:  In: - (0 mL/kg)   Out: 600 (6.7 mL/kg) [Urine:600 (0.2 mL/kg/hr)]  Weight: 89.3 kg     Past Cardiology Tests (Last 3 Years):  EKG:  ECG 12 lead  (Preliminary)      ECG 12 lead 08/09/2024      ECG 12 lead 06/19/2024    Echo:  Transthoracic Echo (TTE) Complete 06/21/2024    Ejection Fractions:  No results found for: \"EF\"  Cath:  No results found for this or any previous visit from the past 1095 days.    Stress Test:  Nuclear Stress Test 03/14/2024    Cardiac Imaging:  No results found for this or any previous visit from the past 1095 days.      Past Medical History:  She has a past medical history of Old " myocardial infarction (04/06/2018), Personal history of malignant neoplasm of breast, Personal history of other diseases of the circulatory system (04/06/2018), Personal history of other diseases of the female genital tract (04/06/2018), Personal history of other endocrine, nutritional and metabolic disease (04/06/2018), and Personal history of other medical treatment.    Past Surgical History:  She has a past surgical history that includes Incisional breast biopsy (04/06/2018); Other surgical history (04/06/2018); Appendectomy (04/06/2018); Other surgical history (09/13/2021); Colonoscopy (05/16/2018); Other surgical history (12/13/2019); Other surgical history (12/13/2019); Other surgical history (12/13/2019); Other surgical history (12/13/2019); Other surgical history (12/13/2019); Other surgical history (12/13/2019); Other surgical history (2009); Cataract extraction (Bilateral, 02/2024); and BI mammo bilateral screening (08/30/2023).      Social History:  She reports that she has never smoked. She has never used smokeless tobacco. She reports current alcohol use. She reports that she does not use drugs.    Family History:  Family History   Problem Relation Name Age of Onset    Hypertension Mother      Heart attack Mother      Heart disease Mother      Skin cancer Father      Heart attack Father      Kidney disease Father      Heart disease Father      Asthma Sister      Hypertension Daughter          Allergies:  Amoxicillin-pot clavulanate, Citalopram, Hydralazine, Levofloxacin, Nitrofurantoin monohyd/m-cryst, Sulfa (sulfonamide antibiotics), and Thiazides    Inpatient Medications:  Scheduled medications   Medication Dose Route Frequency    aspirin  81 mg oral Daily    carvedilol  25 mg oral BID    cilostazol  50 mg oral BID    furosemide  40 mg intravenous q12h    heparin (porcine)  5,000 Units subcutaneous q8h    levothyroxine  100 mcg oral Daily    NIFEdipine ER  90 mg oral Daily     PRN medications    Medication    acetaminophen    Or    acetaminophen    Or    acetaminophen    ipratropium-albuteroL    magnesium hydroxide    ondansetron ODT    Or    ondansetron    oxygen     Continuous Medications   Medication Dose Last Rate     Outpatient Medications:  Current Outpatient Medications   Medication Instructions    alpha lipoic acid 300 mg capsule 1 capsule, oral, Daily    aspirin 81 mg, oral, 2 times daily    carvedilol (COREG) 25 mg, oral, 2 times daily    cholecalciferol (Vitamin D3) 5,000 Units tablet 1 tablet, oral, 2 times daily    cilostazol (PLETAL) 50 mg, oral, 2 times daily    CRANBERRY ORAL 1 capsule, oral, Daily    estradiol (Estrace) 0.01 % (0.1 mg/gram) vaginal cream Insert a pea sized amount into the vagina twice weekly    fish oil/borage/flax/om3,6,9 1 (OMEGA 3-6-9 ORAL) 1 tablet, oral, Daily    furosemide (LASIX) 40 mg, oral, Daily    ipratropium-albuteroL (Duo-Neb) 0.5-2.5 mg/3 mL nebulizer solution 3 mL, nebulization, Every 4 hours PRN    levothyroxine (SYNTHROID, LEVOXYL) 100 mcg, oral, Daily    NIFEdipine ER (ADALAT CC) 90 mg, oral, Daily    oxygen (O2) gas therapy 1 each, inhalation, Every 24 hours    rosuvastatin (CRESTOR) 20 mg, oral, Daily    sodium bicarbonate 1,300 mg, oral, 2 times daily    spironolactone (ALDACTONE) 25 mg, oral, Daily    VITAMIN B COMPLEX-FOLIC ACID ORAL 1 tablet, oral, Daily       Physical Exam:  General: alert, oriented and in no acute distress  HEENT: NC/AT; EOMI; PERRLA, external ear is normal  Neck: supple; trachea midline; no masses; no JVD  Chest: reduced breath sounds bilaterally; crackles b/l; on NC oxygen  Cardio: regular rhythm, S1S2 normal, no murmurs  Abdomen: Soft, non-tender, non-distension, no organomegaly  Extremities: no clubbing/cyanosis/edema  Neuro: Grossly intact     Psychiatric: Normal mood and affect      Assessment/Plan     Mrs. Valorie Lopez is a 78 y.o. non-smoker female being consulted by the Cardiology team for CHF. Patient with prior  "medical history significant for malignant neoplasm of breast, moderate aortic stenosis, diastolic congestive heart failure, moderate mitral valve regurgitation, chronic kidney disease with a baseline creatinine around 1.5, recent pneumonia with residual nocturnal hypoxia, coronary artery disease/MI, prior myocardial infarction (04/06/2018), CAD S/P 3 cardiac stent placement, hypertension, rectal incontinence, hyperlipidemia, anemia, hypothyroidism, solitary kidney and peripheral artery disease. She presented to ED [facility] on 08/25/2024 complaining of 3 days history of worsening shortness of breath and wheezing. Patient reported progressively worsening shortness of breath and wheezing for the past 3 days. She was found to have an acute CHF exacerbation in the setting of acute pulmonary edema and associated with an acute hypoxemic respiratory failure. On presentation, blood pressure 129/47, heart rate 97bpm, respiratory rate 20, afebrile, saturation oxygen 88% on room air. Pertinent findings on blood workup; hemoglobin 11.6, , creatinine 1.49, and bilirubin 1.8. Chest x-ray showed cardiomegaly with pulmonary vascular congestive changes and edema. EKG shows normal sinus rhythm with no signs of acute ischemic changes. Trop 22. Patient was given in the emergency room IV Lasix and placed on the BiPAP and admitted for clinical compensation.    Assessment    # Heart Failure with Preserved LV systolic function  - .  - Trop 22.  - Patient looks \" warm and wet\"  - Echo (06/2024):   1. The left ventricular systolic function is normal, with a visually estimated ejection fraction of 65-70%.   2. Spectral Doppler shows a pseudonormal pattern of left ventricular diastolic filling.   3. There is normal right ventricular global systolic function.   4. Mild to moderate mitral valve regurgitation.   5. Mild aortic valve regurgitation.   6. Mean transaortic gradient 23 mmHg, peak velocity 3.1 m/s, calculated aortic " valve area 1.1 cmï¿½.   7. Findings appear consistent with moderate aortic stenosis.   8. Poorly visualized anatomical structures due to suboptimal image quality.  - Keep daily weights. Patient's admission weight is 196 lb.  - Low sodium diet (2g).  - Strict I&Os.  - Electrolyte control and daily BMP including magnesium.  - General recommendations for heart failure, including low-sodium diet, fluid restriction, daily weight and adherence to medication.   - Keep current medication including Carvedilol 25mg BID, Nifedipine ER 90mg daily, Spironolactone 25mg daily.   - Would suggest to keep Furosemide IV; switch it for PO when breathing inproves.  - Please refer the patient to Cardiology Clinic upon discharge.      # CKD stage 3  - Crea 1.43  - Would suggest to follow Nephrology recs.     # Hyperlipidemia  - Keep home medication with Rosuvastatin 20mg daily.  - Counseled on healthy diet and regular exercise.      # Hypothyroidism  - Keep Levothyroxine 100mcg daily      This critically ill patient continues to be at-risk for clinically significant deterioration / failure due to the above mentioned dysfunctional, unstable organ systems.  I have personally identified and managed all complex critical care issues to prevent aforementioned clinical deterioration.  Critical care time is spent at bedside and/or the immediate area and has included, but is not limited to, the review of diagnostic tests, labs, radiographs, serial assessments of hemodynamics, respiratory status, ventilatory management, and family updates.  Time spent in procedures and teaching are reported separately.     Critical care time: 65 minutes     Peripheral IV 08/25/24 20 G Left Antecubital (Active)   Site Assessment Clean;Dry;Intact 08/26/24 1200   Dressing Type Transparent 08/26/24 1200   Line Status Saline locked 08/26/24 1200   Dressing Status Clean;Dry 08/26/24 1200   Number of days: 1       Code Status:  Full Code    Jett Thao,  MD  Cardiology

## 2024-08-26 NOTE — CONSULTS
"Reason For Consult  Chronic kidney disease    History Of Present Illness  Brit Lopez \"Valorie\" is a 78 y.o. female presenting with shortness of breath.  She states that she woke up acutely and was feeling very short of breath.  She states that she was audibly wheezing she was wearing her CPAP machine with oxygen  She went out to the living room and used oxygen and was still feeling short of breath so presented to the emergency room  She states that the day prior she was feeling her normal self  She has not had issues with swelling  She has been using her CPAP    Past Medical History  She has a past medical history of Old myocardial infarction (04/06/2018), Personal history of malignant neoplasm of breast, Personal history of other diseases of the circulatory system (04/06/2018), Personal history of other diseases of the female genital tract (04/06/2018), Personal history of other endocrine, nutritional and metabolic disease (04/06/2018), and Personal history of other medical treatment.    Surgical History  She has a past surgical history that includes Incisional breast biopsy (04/06/2018); Other surgical history (04/06/2018); Appendectomy (04/06/2018); Other surgical history (09/13/2021); Colonoscopy (05/16/2018); Other surgical history (12/13/2019); Other surgical history (12/13/2019); Other surgical history (12/13/2019); Other surgical history (12/13/2019); Other surgical history (12/13/2019); Other surgical history (12/13/2019); Other surgical history (2009); Cataract extraction (Bilateral, 02/2024); and BI mammo bilateral screening (08/30/2023).     Social History  She reports that she has never smoked. She has never used smokeless tobacco. She reports current alcohol use. She reports that she does not use drugs.    Family History  Family History   Problem Relation Name Age of Onset    Hypertension Mother      Heart attack Mother      Heart disease Mother      Skin cancer Father      Heart attack Father   " "   Kidney disease Father      Heart disease Father      Asthma Sister      Hypertension Daughter          Allergies  Amoxicillin-pot clavulanate, Citalopram, Hydralazine, Levofloxacin, Nitrofurantoin monohyd/m-cryst, Sulfa (sulfonamide antibiotics), and Thiazides    Review of Systems  A full 10 point review of systems was obtained and is negative except HPI as above     Physical Exam  Physical Exam  Constitutional:       Appearance: Normal appearance. She is obese.   HENT:      Head: Normocephalic and atraumatic.      Right Ear: External ear normal.      Left Ear: External ear normal.      Nose: Nose normal.      Mouth/Throat:      Mouth: Mucous membranes are moist.      Pharynx: Oropharynx is clear.   Eyes:      Extraocular Movements: Extraocular movements intact.      Conjunctiva/sclera: Conjunctivae normal.      Pupils: Pupils are equal, round, and reactive to light.   Cardiovascular:      Rate and Rhythm: Normal rate and regular rhythm.      Heart sounds: Murmur heard.   Pulmonary:      Effort: Pulmonary effort is normal.      Breath sounds: Rales present.   Abdominal:      General: Abdomen is flat.      Palpations: Abdomen is soft.   Skin:     General: Skin is warm and dry.   Neurological:      General: No focal deficit present.      Mental Status: She is alert and oriented to person, place, and time.   Psychiatric:         Mood and Affect: Mood normal.         Behavior: Behavior normal.                 I&O 24HR    Intake/Output Summary (Last 24 hours) at 8/26/2024 1340  Last data filed at 8/26/2024 1300  Gross per 24 hour   Intake 240 ml   Output 1800 ml   Net -1560 ml       Vitals 24HR  Heart Rate:  [71-97]   Temp:  [36.2 °C (97.2 °F)-37.7 °C (99.8 °F)]   Resp:  [12-32]   BP: (115-146)/(47-77)   Height:  [162.6 cm (5' 4\")-170.2 cm (5' 7\")]   Weight:  [89.3 kg (196 lb 13.9 oz)-91.2 kg (201 lb)]   SpO2:  [88 %-98 %]     Scheduled medications  aspirin, 81 mg, oral, Daily  carvedilol, 25 mg, oral, " BID  cilostazol, 50 mg, oral, BID  furosemide, 40 mg, intravenous, q12h  heparin (porcine), 5,000 Units, subcutaneous, q8h  levothyroxine, 100 mcg, oral, Daily  NIFEdipine ER, 90 mg, oral, Daily      Continuous medications     PRN medications  PRN medications: acetaminophen **OR** acetaminophen **OR** acetaminophen, ipratropium-albuteroL, magnesium hydroxide, ondansetron ODT **OR** ondansetron, oxygen      Relevant Results  Results reviewed     Assessment/Plan       Chronic kidney disease Stage IIIb/IV: With baseline creatinine 1.5-1.8  Hypertension  Normocytic anemia   Obstructive sleep apnea with use of CPAP  Coronary artery disease with 3 stents placed in past  Hypothyroidism  Hyperlipidemia  Peripheral arterial disease  Obesity  Vitamin D Deficiency  R Renal Atrophy with GFR of 18% and Nephrectomy on 2/1/2019  Diastolic congestive heart failure: Acute on chronic  Moderate aortic stenosis    Plan:  At this time once again she is already feeling better  Renal function is at baseline  She still has rales on exam  She is currently on Lasix 40 every 12 hours I would continue this IV for now  I am fine with staying off of spironolactone for now blood pressure seems stable  I am going to add an SGLT2 inhibitor  We will have to optimize her diuretics  She will need to weigh herself daily and record it  I will discuss with cardiology.  With her recurring symptoms we may need to look more closely at her aortic valve.  Thanks for the consult    Assessment & Plan  Congestive heart failure, unspecified HF chronicity, unspecified heart failure type (Multi)          Vincent Cain,

## 2024-08-26 NOTE — PROGRESS NOTES
08/26/24 1615   Discharge Planning   Living Arrangements Spouse/significant other   Support Systems Spouse/significant other;Children   Assistance Needed None   Type of Residence Private residence   Number of Stairs to Enter Residence 2   Number of Stairs Within Residence 0   Do you have animals or pets at home? No   Home or Post Acute Services None   Expected Discharge Disposition Home   Does the patient need discharge transport arranged? Yes   RoundTrip coordination needed? No   Financial Resource Strain   How hard is it for you to pay for the very basics like food, housing, medical care, and heating? Not hard   Housing Stability   In the last 12 months, was there a time when you were not able to pay the mortgage or rent on time? N   In the past 12 months, how many times have you moved where you were living? 0   At any time in the past 12 months, were you homeless or living in a shelter (including now)? N   Transportation Needs   In the past 12 months, has lack of transportation kept you from medical appointments or from getting medications? no   In the past 12 months, has lack of transportation kept you from meetings, work, or from getting things needed for daily living? No     Care Transitions: Patient reviewed in care round meeting this AM. ADOD 24-48 hours. Met with patient and daughter Brittany at bedside. Role of TCC explained. Demographics and contact verified, will update contact with daughter Brittany. Lives with spouse, feels safe. PCP is Dr. Sterling. She wears home O2 @ 2 L/M via NC and a bipap with O2 at night. Supplier is [employer]. She is independent at home with [employer] and still drives self. Denies the need for any DME equipment and does not currently use any. She has a walk in shower with built in seat and grab bars. Denies the need for HHC services. [facility] 24/24. Discussed [facility] and given information flyer on the services offered since patient is a re-admit. States she will talk  it over with her spouse and consider it. Discharge plan is home with spouse, denies needs. Care team to follow. Latrice Cota RN/TCC

## 2024-08-26 NOTE — ED PROVIDER NOTES
HPI   Chief Complaint   Patient presents with    Shortness of Breath     Pt comes in for SOB. Pt states that she was recently hospitalized in the past for pneumonia in June and had fluid in her left lung 2 wks ago. Pt states starting on Thursday night she began wheezing and has progressively gotten worse. Pt states her oxygen level has been falling into the low 80s at home. Pt was 88% on room air on arrival.        Patient presents in respiratory distress.  States she is returning with similar complaints that she had had in the past.  States that she was admitted about 2 to 3 weeks ago for the same thing.  She does describe that she was on BiPAP at that time.  Patient states she has been using home oxygen at 2 L without improvement.  States that she cannot use the nasal cannula in her nose but keeps it in her mouth.  Denies any chest pain.      History provided by:  Patient  History limited by:  Acuity of condition          Patient History   Past Medical History:   Diagnosis Date    Old myocardial infarction 04/06/2018    History of myocardial infarction    Personal history of malignant neoplasm of breast     History of malignant neoplasm of breast    Personal history of other diseases of the circulatory system 04/06/2018    History of hypertension    Personal history of other diseases of the female genital tract 04/06/2018    History of uterine prolapse    Personal history of other endocrine, nutritional and metabolic disease 04/06/2018    History of hyperlipidemia    Personal history of other medical treatment     H/O mammogram     Past Surgical History:   Procedure Laterality Date    APPENDECTOMY  04/06/2018    Appendectomy    BI MAMMO BILATERAL SCREENING  08/30/2023    cat 2    CATARACT EXTRACTION Bilateral 02/2024    COLONOSCOPY  05/16/2018    DR MELGAR    INCISIONAL BREAST BIOPSY  04/06/2018    Incisional Breast Biopsy    OTHER SURGICAL HISTORY  04/06/2018    Breast Surgery Revision Of Reconstructed Right  Breast    OTHER SURGICAL HISTORY  09/13/2021    Hip replacement    OTHER SURGICAL HISTORY  12/13/2019    Tubal ligation    OTHER SURGICAL HISTORY  12/13/2019    Kidney surgery    OTHER SURGICAL HISTORY  12/13/2019    Hysterectomy    OTHER SURGICAL HISTORY  12/13/2019    Cardiac catheterization with stent placement    OTHER SURGICAL HISTORY  12/13/2019    Cardiac catheterization    OTHER SURGICAL HISTORY  12/13/2019    Myringotomy with tube placement    OTHER SURGICAL HISTORY  2009    Right mastectomy     Family History   Problem Relation Name Age of Onset    Hypertension Mother      Heart attack Mother      Heart disease Mother      Skin cancer Father      Heart attack Father      Kidney disease Father      Heart disease Father      Asthma Sister      Hypertension Daughter       Social History     Tobacco Use    Smoking status: Never    Smokeless tobacco: Never   Vaping Use    Vaping status: Never Used   Substance Use Topics    Alcohol use: Yes    Drug use: Never       Physical Exam   ED Triage Vitals [08/25/24 2024]   Temperature Heart Rate Respirations BP   37.7 °C (99.8 °F) 97 20 (!) 129/47      Pulse Ox Temp Source Heart Rate Source Patient Position   (!) 88 % Oral Monitor Sitting      BP Location FiO2 (%)     Left arm --       Physical Exam  Vitals and nursing note reviewed.   Constitutional:       General: She is not in acute distress.     Appearance: She is well-developed and normal weight. She is not ill-appearing or toxic-appearing.   HENT:      Head: Normocephalic.      Nose: Nose normal.      Mouth/Throat:      Lips: Pink. No lesions.      Mouth: Mucous membranes are moist.   Eyes:      General: Lids are normal.      Pupils: Pupils are equal, round, and reactive to light.   Cardiovascular:      Rate and Rhythm: Normal rate and regular rhythm.   Pulmonary:      Effort: Tachypnea and respiratory distress present.      Breath sounds: Examination of the right-lower field reveals rales. Examination of the  left-lower field reveals rales. Decreased breath sounds and rales present.   Musculoskeletal:      Right lower leg: Edema present.      Left lower leg: Edema present.   Skin:     General: Skin is warm.      Capillary Refill: Capillary refill takes less than 2 seconds.   Neurological:      General: No focal deficit present.      Mental Status: She is alert and oriented to person, place, and time.      Cranial Nerves: No cranial nerve deficit or facial asymmetry.      Motor: No weakness.   Psychiatric:         Attention and Perception: Attention and perception normal.         Mood and Affect: Mood is anxious.         Speech: Speech is rapid and pressured.         Behavior: Behavior normal. Behavior is cooperative.         Thought Content: Thought content normal.         Cognition and Memory: Cognition and memory normal.         Judgment: Judgment normal.           ED Course & MDM   Diagnoses as of 08/25/24 2415   Congestive heart failure, unspecified HF chronicity, unspecified heart failure type (Multi)   Acute respiratory failure, unspecified whether with hypoxia or hypercapnia (Multi)                 No data recorded                                 Medical Decision Making  Patient presents in respiratory distress.  States she is returning with similar complaints that she had had in the past.  States that she was admitted about 2 to 3 weeks ago for the same thing.  She does describe that she was on BiPAP at that time.  Patient states she has been using home oxygen at 2 L without improvement.  States that she cannot use the nasal cannula in her nose but keeps it in her mouth.  Denies any chest pain.    Ddx: CHF, cardiac, pulmonary, respiratory distress, metabolic, infection, other    Immediately obtained ABG and EKG.  ABG showed some hypoxemia and low CO2.  Patient is neither oxygenating nor ventilating well.  Discussed options with respiratory therapy and due to patient's work of breathing we will place patient on  BiPAP.  Will obtain cardiac workup  Patient's troponin was slightly elevated as well as her BNP  Chest x-ray consistent with CHF  Patient given IV Lasix 40 mg  Remains on monitor  Consulted hospitalist for admission with acceptance to the ICU    Problems Addressed:  Acute respiratory failure, unspecified whether with hypoxia or hypercapnia (Multi): acute illness or injury  Congestive heart failure, unspecified HF chronicity, unspecified heart failure type (Multi): undiagnosed new problem with uncertain prognosis    Amount and/or Complexity of Data Reviewed  Labs: ordered. Decision-making details documented in ED Course.  Radiology: ordered and independent interpretation performed. Decision-making details documented in ED Course.  ECG/medicine tests: ordered and independent interpretation performed. Decision-making details documented in ED Course.     Details: Read by myself and attending showing normal sinus rhythm at a ventricular rate of 89 bpm.  Normal axis.  No ST segment elevation.  MO interval 144 with a QT of 366    Risk  Decision regarding hospitalization.  Decision not to resuscitate or to de-escalate care because of poor prognosis.        Procedure  Procedures     Luba Valerio PA-C  08/25/24 1281

## 2024-08-27 LAB
ANION GAP SERPL CALC-SCNC: 14 MMOL/L (ref 10–20)
BUN SERPL-MCNC: 33 MG/DL (ref 6–23)
CALCIUM SERPL-MCNC: 9.2 MG/DL (ref 8.6–10.3)
CHLORIDE SERPL-SCNC: 105 MMOL/L (ref 98–107)
CO2 SERPL-SCNC: 23 MMOL/L (ref 21–32)
CREAT SERPL-MCNC: 1.64 MG/DL (ref 0.5–1.05)
EGFRCR SERPLBLD CKD-EPI 2021: 32 ML/MIN/1.73M*2
GLUCOSE SERPL-MCNC: 125 MG/DL (ref 74–99)
HOLD SPECIMEN: NORMAL
POTASSIUM SERPL-SCNC: 3.6 MMOL/L (ref 3.5–5.3)
SODIUM SERPL-SCNC: 138 MMOL/L (ref 136–145)

## 2024-08-27 PROCEDURE — 99232 SBSQ HOSP IP/OBS MODERATE 35: CPT | Performed by: STUDENT IN AN ORGANIZED HEALTH CARE EDUCATION/TRAINING PROGRAM

## 2024-08-27 PROCEDURE — 80048 BASIC METABOLIC PNL TOTAL CA: CPT | Performed by: INTERNAL MEDICINE

## 2024-08-27 PROCEDURE — 2500000001 HC RX 250 WO HCPCS SELF ADMINISTERED DRUGS (ALT 637 FOR MEDICARE OP): Performed by: STUDENT IN AN ORGANIZED HEALTH CARE EDUCATION/TRAINING PROGRAM

## 2024-08-27 PROCEDURE — 94640 AIRWAY INHALATION TREATMENT: CPT

## 2024-08-27 PROCEDURE — 2500000001 HC RX 250 WO HCPCS SELF ADMINISTERED DRUGS (ALT 637 FOR MEDICARE OP): Performed by: INTERNAL MEDICINE

## 2024-08-27 PROCEDURE — 94762 N-INVAS EAR/PLS OXIMTRY CONT: CPT

## 2024-08-27 PROCEDURE — 2500000005 HC RX 250 GENERAL PHARMACY W/O HCPCS: Performed by: STUDENT IN AN ORGANIZED HEALTH CARE EDUCATION/TRAINING PROGRAM

## 2024-08-27 PROCEDURE — 2500000004 HC RX 250 GENERAL PHARMACY W/ HCPCS (ALT 636 FOR OP/ED): Performed by: STUDENT IN AN ORGANIZED HEALTH CARE EDUCATION/TRAINING PROGRAM

## 2024-08-27 PROCEDURE — 99291 CRITICAL CARE FIRST HOUR: CPT | Performed by: STUDENT IN AN ORGANIZED HEALTH CARE EDUCATION/TRAINING PROGRAM

## 2024-08-27 PROCEDURE — 36415 COLL VENOUS BLD VENIPUNCTURE: CPT | Performed by: INTERNAL MEDICINE

## 2024-08-27 PROCEDURE — 2500000004 HC RX 250 GENERAL PHARMACY W/ HCPCS (ALT 636 FOR OP/ED): Performed by: INTERNAL MEDICINE

## 2024-08-27 PROCEDURE — 1100000001 HC PRIVATE ROOM DAILY

## 2024-08-27 PROCEDURE — 2500000002 HC RX 250 W HCPCS SELF ADMINISTERED DRUGS (ALT 637 FOR MEDICARE OP, ALT 636 FOR OP/ED): Performed by: STUDENT IN AN ORGANIZED HEALTH CARE EDUCATION/TRAINING PROGRAM

## 2024-08-27 PROCEDURE — 2500000005 HC RX 250 GENERAL PHARMACY W/O HCPCS: Performed by: INTERNAL MEDICINE

## 2024-08-27 PROCEDURE — 99233 SBSQ HOSP IP/OBS HIGH 50: CPT | Performed by: INTERNAL MEDICINE

## 2024-08-27 PROCEDURE — 2500000002 HC RX 250 W HCPCS SELF ADMINISTERED DRUGS (ALT 637 FOR MEDICARE OP, ALT 636 FOR OP/ED): Performed by: INTERNAL MEDICINE

## 2024-08-27 ASSESSMENT — COGNITIVE AND FUNCTIONAL STATUS - GENERAL
MOBILITY SCORE: 24
DAILY ACTIVITIY SCORE: 24
MOBILITY SCORE: 24

## 2024-08-27 ASSESSMENT — PAIN - FUNCTIONAL ASSESSMENT
PAIN_FUNCTIONAL_ASSESSMENT: 0-10

## 2024-08-27 ASSESSMENT — PAIN SCALES - GENERAL
PAINLEVEL_OUTOF10: 0 - NO PAIN

## 2024-08-27 NOTE — NURSING NOTE
Pt ambulated with standby assist from room 334 in ICU to 306 and placed in recliner chair in the room. Belongings accompanied patient to include: purse, CPAP, cell phone and , glasses, and clothing. Family to bedside as well. Call light in reach. No further needs identified.

## 2024-08-27 NOTE — PROGRESS NOTES
Subjective Data:  Patient reports feeling well, no new adverse events overnight. Patient denies any chest pain, shortness of breath, palpitations, dizziness or syncope. Patient is hemodynamically stable.     Overnight Events:    No     Objective Data:  Last Recorded Vitals:  Vitals:    08/27/24 0645 08/27/24 0700 08/27/24 0800 08/27/24 0900   BP:  126/67 129/57 130/72   BP Location:  Left arm     Patient Position:  Lying     Pulse:  79 79 84   Resp: 16 11 19 19   Temp:  36.3 °C (97.3 °F)     TempSrc:  Temporal     SpO2: 96% 95% (!) 89% 98%   Weight:       Height:           Last Labs:  CBC - 8/25/2024:  8:44 PM  10.0 11.6 205    34.3      CMP - 8/27/2024:  5:13 AM  9.2 6.1 18 --- 1.6   2.3 3.5 15 37      PTT - No results in last year.  _   _ _     TROPHS   Date/Time Value Ref Range Status   08/25/2024 08:44 PM 22 0 - 13 ng/L Final   08/10/2024 04:28 AM 23 0 - 13 ng/L Final   08/09/2024 06:16 PM 43 0 - 13 ng/L Final     BNP   Date/Time Value Ref Range Status   08/25/2024 08:44  0 - 99 pg/mL Final   08/09/2024 02:30  0 - 99 pg/mL Final     HGBA1C   Date/Time Value Ref Range Status   08/11/2024 04:39 AM 6.2 see below % Final   02/26/2024 11:28 AM 6.1 see below % Final     LDLCALC   Date/Time Value Ref Range Status   06/03/2024 11:30 AM 85 <=99 mg/dL Final     Comment:                                 Near   Borderline      AGE      Desirable  Optimal    High     High     Very High     0-19 Y     0 - 109     ---    110-129   >/= 130     ----    20-24 Y     0 - 119     ---    120-159   >/= 160     ----      >24 Y     0 -  99   100-129  130-159   160-189     >/=190       VLDL   Date/Time Value Ref Range Status   06/03/2024 11:30 AM 41 0 - 40 mg/dL Final   12/12/2020 08:55 AM 52 0 - 40 mg/dL Final      Last I/O:  I/O last 3 completed shifts:  In: 1080 (12.5 mL/kg) [P.O.:1080]  Out: 2850 (32.9 mL/kg) [Urine:2850 (0.9 mL/kg/hr)]  Weight: 86.7 kg     Past Cardiology Tests (Last 3 Years):  EKG:  ECG 12 lead       ECG  "12 lead 08/09/2024      ECG 12 lead 06/19/2024    Echo:  Transthoracic Echo (TTE) Complete 06/21/2024    Ejection Fractions:  No results found for: \"EF\"  Cath:  No results found for this or any previous visit from the past 1095 days.    Stress Test:  Nuclear Stress Test 03/14/2024    Cardiac Imaging:  No results found for this or any previous visit from the past 1095 days.      Inpatient Medications:  Scheduled medications   Medication Dose Route Frequency    aspirin  81 mg oral Daily    carvedilol  25 mg oral BID    cilostazol  50 mg oral BID    dapagliflozin propanediol  10 mg oral q24h    furosemide  40 mg intravenous q12h    heparin (porcine)  5,000 Units subcutaneous q8h    levothyroxine  100 mcg oral Daily    NIFEdipine ER  90 mg oral Daily     PRN medications   Medication    acetaminophen    Or    acetaminophen    Or    acetaminophen    ipratropium-albuteroL    magnesium hydroxide    ondansetron ODT    Or    ondansetron    oxygen     Continuous Medications   Medication Dose Last Rate       Physical Exam:  General: alert, oriented and in no acute distress  HEENT: NC/AT; EOMI; PERRLA, external ear is normal  Neck: supple; trachea midline; no masses; no JVD  Chest: reduced breath sounds bilaterally; crackles b/l; on NC oxygen  Cardio: regular rhythm, S1S2 normal, systolic murmur 2+/6+ RUSB  Abdomen: Soft, non-tender, non-distension, no organomegaly  Extremities: no clubbing/cyanosis/edema  Neuro: Grossly intact     Psychiatric: Normal mood and affect     Assessment/Plan     Mrs. Valorie Lopez is a 78 y.o. non-smoker female being consulted by the Cardiology team for CHF. Patient with prior medical history significant for malignant neoplasm of breast, moderate aortic stenosis, diastolic congestive heart failure, moderate mitral valve regurgitation, chronic kidney disease with a baseline creatinine around 1.5, recent pneumonia with residual nocturnal hypoxia, coronary artery disease/MI, prior myocardial " "infarction (04/06/2018), CAD S/P 3 cardiac stent placement, hypertension, rectal incontinence, hyperlipidemia, anemia, hypothyroidism, solitary kidney and peripheral artery disease. She presented to ED [facility] on 08/25/2024 complaining of 3 days history of worsening shortness of breath and wheezing. Patient reported progressively worsening shortness of breath and wheezing for the past 3 days. She was found to have an acute CHF exacerbation in the setting of acute pulmonary edema and associated with an acute hypoxemic respiratory failure. On presentation, blood pressure 129/47, heart rate 97bpm, respiratory rate 20, afebrile, saturation oxygen 88% on room air. Pertinent findings on blood workup; hemoglobin 11.6, , creatinine 1.49, and bilirubin 1.8. Chest x-ray showed cardiomegaly with pulmonary vascular congestive changes and edema. EKG shows normal sinus rhythm with no signs of acute ischemic changes. Trop 22. Patient was given in the emergency room IV Lasix and placed on the BiPAP and admitted for clinical compensation.     Assessment     # Moderate Aortic stenosis / Heart Failure with Preserved LV systolic function   - .  - Trop 22.  - Patient looks \" warm and wet\"  - Echo (06/2024):   1. The left ventricular systolic function is normal, with a visually estimated ejection fraction of 65-70%.   2. Spectral Doppler shows a pseudonormal pattern of left ventricular diastolic filling.   3. There is normal right ventricular global systolic function.   4. Mild to moderate mitral valve regurgitation.   5. Mild aortic valve regurgitation.   6. Mean transaortic gradient 23 mmHg, peak velocity 3.1 m/s, calculated aortic valve area 1.1 cm2.   7. Findings appear consistent with moderate aortic stenosis.   8. Poorly visualized anatomical structures due to suboptimal image quality.  - Keep daily weights. Patient's admission weight is 196 lb.  - Low sodium diet (2g).  - Strict I&Os.  - Electrolyte control and " daily BMP including magnesium.  - General recommendations for heart failure, including low-sodium diet, fluid restriction, daily weight and adherence to medication.   - Keep current medication including Carvedilol 25mg BID, Nifedipine ER 90mg daily, Spironolactone 25mg daily.   - Would suggest to switch Furosemide IV for PO when breathing inproves.  - Please refer the patient to Cardiology Clinic upon discharge. Her symptoms have been getting worse for the past months with multiple admissions due to decompensated heart failure. We will refer the patient for Structural Heart Team assessment for Aortic Stenosis and possible TAVR. Patient will need Left Heart Catheterization prior to the TAVR evaluation.      # CKD stage 3  - Crea 1.43 --> 1.64  - Would suggest to follow Nephrology recs.     # Hyperlipidemia  - Keep home medication with Rosuvastatin 20mg daily.  - Counseled on healthy diet and regular exercise.      # Hypothyroidism  - Keep Levothyroxine 100mcg daily      This critically ill patient continues to be at-risk for clinically significant deterioration / failure due to the above mentioned dysfunctional, unstable organ systems.  I have personally identified and managed all complex critical care issues to prevent aforementioned clinical deterioration.  Critical care time is spent at bedside and/or the immediate area and has included, but is not limited to, the review of diagnostic tests, labs, radiographs, serial assessments of hemodynamics, respiratory status, ventilatory management, and family updates.  Time spent in procedures and teaching are reported separately.     Critical care time: 60 minutes    Peripheral IV 08/25/24 20 G Left Antecubital (Active)   Site Assessment Clean;Dry;Intact 08/27/24 0600   Dressing Type Transparent 08/27/24 0600   Line Status Saline locked 08/27/24 0600   Dressing Status Clean;Dry 08/27/24 0600   Number of days: 2     Code Status:  Full Code    Jett Thao,  MD  Cardiology

## 2024-08-27 NOTE — PROGRESS NOTES
"Brit Lopez \"Valorie\" is a 78 y.o. female on day 2 of admission presenting with Congestive heart failure, unspecified HF chronicity, unspecified heart failure type (Multi).      Subjective   Patient seen and examined at the bedside.  She is on oxygen.  She says she has been using oxygen on and off.  But advised her to be on oxygen all day since her last discharge.       Objective     Last Recorded Vitals  /72   Pulse 84   Temp 36.3 °C (97.3 °F) (Temporal)   Resp 19   Wt 86.7 kg (191 lb 2.2 oz)   SpO2 98%   Intake/Output last 3 Shifts:    Intake/Output Summary (Last 24 hours) at 8/27/2024 0915  Last data filed at 8/27/2024 0900  Gross per 24 hour   Intake 1320 ml   Output 2500 ml   Net -1180 ml       Admission Weight  Weight: 91.2 kg (201 lb) (08/25/24 2024)    Daily Weight  08/27/24 : 86.7 kg (191 lb 2.2 oz)    Image Results  ECG 12 lead  Normal sinus rhythm  Nonspecific ST abnormality  Abnormal ECG  When compared with ECG of 09-AUG-2024 11:32,  T wave inversion no longer evident in Inferior leads  T wave amplitude has decreased in Lateral leads  See ED provider note for full interpretation and clinical correlation  Confirmed by Sydney Bhatti ([id number]) on 8/26/2024 9:46:29 PM      Physical Exam  Constitutional:       Appearance: Normal appearance.   HENT:      Head: Normocephalic and atraumatic.      Right Ear: Tympanic membrane normal.      Nose: Nose normal.      Mouth/Throat:      Mouth: Mucous membranes are moist.   Eyes:      Pupils: Pupils are equal, round, and reactive to light.   Cardiovascular:      Rate and Rhythm: Normal rate.      Pulses: Normal pulses.   Pulmonary:      Breath sounds: Wheezing and rales present.   Abdominal:      Palpations: Abdomen is soft.   Musculoskeletal:         General: Normal range of motion.      Cervical back: Normal range of motion.   Skin:     General: Skin is dry.   Neurological:      General: No focal deficit present.      Mental Status: She is alert. "   Psychiatric:         Mood and Affect: Mood normal.         Relevant Results           Scheduled medications  aspirin, 81 mg, oral, Daily  carvedilol, 25 mg, oral, BID  cilostazol, 50 mg, oral, BID  dapagliflozin propanediol, 10 mg, oral, q24h  furosemide, 40 mg, intravenous, q12h  heparin (porcine), 5,000 Units, subcutaneous, q8h  levothyroxine, 100 mcg, oral, Daily  NIFEdipine ER, 90 mg, oral, Daily      Continuous medications     PRN medications  PRN medications: acetaminophen **OR** acetaminophen **OR** acetaminophen, ipratropium-albuteroL, magnesium hydroxide, ondansetron ODT **OR** ondansetron, oxygen      Assessment/Plan        78-year-old obese female with a past medical history of moderate aortic stenosis, diastolic congestive heart failure, moderate mitral valve regurgitation, chronic kidney disease with a baseline creatinine around 1.5, recent pneumonia with residual nocturnal hypoxia, coronary artery disease/MI, hypertension, rectal incontinence, hyperlipidemia, anemia, hypothyroidism, solitary kidney and peripheral artery disease who presented to the emergency room for 3 days history of worsening shortness of breath and wheezing. Patient was found to have an acute CHF exacerbation in the setting of acute pulmonary edema and associated with an acute hypoxemic respiratory failure.     Assessment  Acute congestive heart failure exacerbation  Acute pulmonary edema  CKD stage III  Chronic UTI untreated  NSTEMI type II  Hyperlipidemia   Normocytic anemia  Hypothyroidism  Peripheral arterial disease  Obesity  History of nephrectomy right side  Vitamin D deficiency  Hyperlipidemia      Continue BiPAP as needed  Continue IV Lasix  Continue current dose of levothyroxine.  We can defer this to outpatient once her acute sickness get better  Bilirubin levels are improving  Creatinine is improving  SGLT2 inhibitors added yesterday per nephrology  2D echocardiogram was done recently.  Will review with  cardiology      Dvt prophylaxis:heparin  Gi prophylaxis: BRANDIN Jacobs MD

## 2024-08-27 NOTE — CARE PLAN
Problem: Discharge Planning  Goal: Discharge to home or other facility with appropriate resources  Outcome: Progressing     Problem: Chronic Conditions and Co-morbidities  Goal: Patient's chronic conditions and co-morbidity symptoms are monitored and maintained or improved  Outcome: Progressing     Problem: Heart Failure  Goal: Improved gas exchange this shift  Outcome: Progressing  Goal: Reduction in peripheral edema within 24 hours  Outcome: Progressing   The patient's goals for the shift include      The clinical goals for the shift include increase activity/mobility    Over the shift, the patient did not make progress toward the following goals. Barriers to progression include . Recommendations to address these barriers include .

## 2024-08-27 NOTE — CONSULTS
CHF Navigator was consulted for CHF screening.    Notes, labs, flowsheets and medications reviewed in EMR.    Admitting Dx: CHF  Cardiologist/PCP: Dr. Domingo  Last Echo: 6/21/24  EF:65-70%   BNP: 458 on 8/25/24    Patient is a readmit, patient was just admitted on 8/9/24 for Acute Hypoxic Respiratory Failure, patient states she was admitted for pneumonia. Valorie is an active patient with [facility] Cardiologist Dr. Domingo and was last seen on 6/25/24. She recently had a PFT completed and is scheduled to see Dr. Domingo again on 1/14/25. Valorie will continue with [facility] Cardiology. Would suggest to have an appointment post discharge.

## 2024-08-27 NOTE — PROGRESS NOTES
"Brit Lopez \"Valorie\" is a 78 y.o. female on day 2 of admission presenting with Congestive heart failure, unspecified HF chronicity, unspecified heart failure type (Multi).      Subjective      Patient seen and examined at bedside    Bit better today.  Continues to be on oxygen    Objective     Last Recorded Vitals  /54 (BP Location: Left arm, Patient Position: Lying)   Pulse 79   Temp 36.9 °C (98.4 °F) (Temporal)   Resp 18   Wt 86.7 kg (191 lb 2.2 oz)   SpO2 (!) 89%   Intake/Output last 3 Shifts:    Intake/Output Summary (Last 24 hours) at 8/27/2024 1633  Last data filed at 8/27/2024 1300  Gross per 24 hour   Intake 1060 ml   Output 2500 ml   Net -1440 ml       Admission Weight  Weight: 91.2 kg (201 lb) (08/25/24 2024)    Daily Weight  08/27/24 : 86.7 kg (191 lb 2.2 oz)    Image Results  ECG 12 lead  Normal sinus rhythm  Nonspecific ST abnormality  Abnormal ECG  When compared with ECG of 09-AUG-2024 11:32,  T wave inversion no longer evident in Inferior leads  T wave amplitude has decreased in Lateral leads  See ED provider note for full interpretation and clinical correlation  Confirmed by Sydney Bhatti ([id number]) on 8/26/2024 9:46:29 PM      Physical Exam    Relevant Results           Physical Exam  Constitutional:       Appearance: Normal appearance.   HENT:      Head: Normocephalic and atraumatic.      Right Ear: Tympanic membrane normal.      Nose: Nose normal.      Mouth/Throat:      Mouth: Mucous membranes are moist.   Eyes:      Pupils: Pupils are equal, round, and reactive to light.   Cardiovascular:      Rate and Rhythm: Normal rate.      Pulses: Normal pulses.   Pulmonary:      Breath sounds: Wheezing and rales present.   Abdominal:      Palpations: Abdomen is soft.   Musculoskeletal:         General: Normal range of motion.      Cervical back: Normal range of motion.   Skin:     General: Skin is dry.   Neurological:      General: No focal deficit present.      Mental Status: She is " alert.   Psychiatric:         Mood and Affect: Mood normal.    Assessment/Plan        78-year-old obese female with a past medical history of moderate aortic stenosis, diastolic congestive heart failure, moderate mitral valve regurgitation, chronic kidney disease with a baseline creatinine around 1.5, recent pneumonia with residual nocturnal hypoxia, coronary artery disease/MI, hypertension, rectal incontinence, hyperlipidemia, anemia, hypothyroidism, solitary kidney and peripheral artery disease who presented to the emergency room for 3 days history of worsening shortness of breath and wheezing. Patient was found to have an acute CHF exacerbation in the setting of acute pulmonary edema and associated with an acute hypoxemic respiratory failure.      Assessment  Acute congestive heart failure exacerbation  Acute pulmonary edema  CKD stage III  Chronic UTI untreated  NSTEMI type II  Hyperlipidemia   Normocytic anemia  Hypothyroidism  Peripheral arterial disease  Obesity  History of nephrectomy right side  Vitamin D deficiency  Hyperlipidemia      Vitals appear stable  Creatinine is improving  Patient continues to have a lot of Rales.  Continue IV Lasix  Continue current dose of levothyroxine.  We can defer this to outpatient once her acute sickness get better  Patient will need outpatient evaluation for aortic stenosis        Dvt prophylaxis:heparin  Gi prophylaxis: BRANDIN Jacobs MD

## 2024-08-27 NOTE — PROGRESS NOTES
Per medical team, patient is not yet medically appropriate for discharge today; Patient continues to receive IV Lasix, and will likely require another 24 hours in the hospital. Medical team questions whether or not patient is compliant with patient's home oxygen use.  - 1640:  met with patient, patient's adult dtr, and patient's son-in-law in patient's room to review plan. Permission given to discuss plan in front of family. Patient in agreement with [facility]; states that patient's  will be able to manage all other needs. Patient's current plan is to return home with patient's  when medically ready, with new services to begin through [facility], and with no additional Care Transitions needs foreseen. Care Transitions to follow and assist should any new needs arise. ALEX Espinosa

## 2024-08-27 NOTE — PROGRESS NOTES
"Brit Lopez \"Valorie\" is a 78 y.o. female on day 2 of admission presenting with Congestive heart failure, unspecified HF chronicity, unspecified heart failure type (Multi).      Subjective   Patient seen and examined at the bedside this morning  She is sitting comfortably in the bedside chair  She states that she is feeling better  No major complaints at this time  She denies shortness of breath         Objective          Vitals 24HR  Heart Rate:  [70-84]   Temp:  [36.1 °C (97 °F)-36.8 °C (98.2 °F)]   Resp:  [11-24]   BP: (103-140)/(47-90)   Weight:  [86.7 kg (191 lb 2.2 oz)]   SpO2:  [88 %-98 %]         Intake/Output last 3 Shifts:    Intake/Output Summary (Last 24 hours) at 8/27/2024 1402  Last data filed at 8/27/2024 1300  Gross per 24 hour   Intake 1060 ml   Output 2500 ml   Net -1440 ml       Physical Exam  Constitutional:       Appearance: Normal appearance. She is obese.   HENT:      Head: Normocephalic and atraumatic.      Right Ear: External ear normal.      Left Ear: External ear normal.      Nose: Nose normal.      Mouth/Throat:      Mouth: Mucous membranes are moist.      Pharynx: Oropharynx is clear.   Eyes:      Extraocular Movements: Extraocular movements intact.      Conjunctiva/sclera: Conjunctivae normal.      Pupils: Pupils are equal, round, and reactive to light.   Cardiovascular:      Rate and Rhythm: Normal rate and regular rhythm.      Heart sounds: Murmur heard.   Pulmonary:      Effort: Pulmonary effort is normal.      Breath sounds: Rales present.   Abdominal:      General: Abdomen is flat.      Palpations: Abdomen is soft.   Skin:     General: Skin is warm and dry.   Neurological:      General: No focal deficit present.      Mental Status: She is alert and oriented to person, place, and time.   Psychiatric:         Mood and Affect: Mood normal.         Behavior: Behavior normal.     Scheduled medications  aspirin, 81 mg, oral, Daily  carvedilol, 25 mg, oral, BID  cilostazol, 50 mg, " oral, BID  dapagliflozin propanediol, 10 mg, oral, q24h  furosemide, 40 mg, intravenous, q12h  heparin (porcine), 5,000 Units, subcutaneous, q8h  levothyroxine, 100 mcg, oral, Daily  NIFEdipine ER, 90 mg, oral, Daily      Continuous medications     PRN medications  PRN medications: acetaminophen **OR** acetaminophen **OR** acetaminophen, ipratropium-albuteroL, magnesium hydroxide, ondansetron ODT **OR** ondansetron, oxygen      Relevant Results               Assessment/Plan              Assessment & Plan  Congestive heart failure, unspecified HF chronicity, unspecified heart failure type (Multi)    Chronic kidney disease Stage IIIb/IV: With baseline creatinine 1.5-1.8  Hypertension  Normocytic anemia   Obstructive sleep apnea with use of CPAP  Coronary artery disease with 3 stents placed in past  Hypothyroidism  Hyperlipidemia  Peripheral arterial disease  Obesity  Vitamin D Deficiency  R Renal Atrophy with GFR of 18% and Nephrectomy on 2/1/2019  Diastolic congestive heart failure: Acute on chronic  Moderate aortic stenosis    Plan:  Continue IV diuresis for now  I discussed with Dr. Haywood and I think would be prudent to do a workup for her aortic valve as I really do not see any other reason why she continues to have these issues repeatedly after she has been stable for so long  Continue medical management as she is on  Will plan to follow-up as an outpatient for possible workup of her aortic stenosis  Hopefully can be ready for discharge tomorrow after we transition her to oral medications  Continue Denton Cain, DO

## 2024-08-28 ENCOUNTER — PHARMACY VISIT (OUTPATIENT)
Dept: PHARMACY | Facility: CLINIC | Age: 79
End: 2024-08-28
Payer: COMMERCIAL

## 2024-08-28 VITALS
TEMPERATURE: 97.3 F | DIASTOLIC BLOOD PRESSURE: 58 MMHG | HEIGHT: 64 IN | WEIGHT: 191.14 LBS | HEART RATE: 80 BPM | SYSTOLIC BLOOD PRESSURE: 107 MMHG | RESPIRATION RATE: 16 BRPM | BODY MASS INDEX: 32.63 KG/M2 | OXYGEN SATURATION: 96 %

## 2024-08-28 LAB
ANION GAP SERPL CALC-SCNC: 13 MMOL/L (ref 10–20)
BUN SERPL-MCNC: 35 MG/DL (ref 6–23)
CALCIUM SERPL-MCNC: 9.2 MG/DL (ref 8.6–10.3)
CHLORIDE SERPL-SCNC: 103 MMOL/L (ref 98–107)
CO2 SERPL-SCNC: 24 MMOL/L (ref 21–32)
CREAT SERPL-MCNC: 1.8 MG/DL (ref 0.5–1.05)
EGFRCR SERPLBLD CKD-EPI 2021: 28 ML/MIN/1.73M*2
ERYTHROCYTE [DISTWIDTH] IN BLOOD BY AUTOMATED COUNT: 13 % (ref 11.5–14.5)
GLUCOSE SERPL-MCNC: 122 MG/DL (ref 74–99)
HCT VFR BLD AUTO: 35.4 % (ref 36–46)
HGB BLD-MCNC: 12 G/DL (ref 12–16)
MCH RBC QN AUTO: 30.9 PG (ref 26–34)
MCHC RBC AUTO-ENTMCNC: 33.9 G/DL (ref 32–36)
MCV RBC AUTO: 91 FL (ref 80–100)
NRBC BLD-RTO: 0 /100 WBCS (ref 0–0)
PLATELET # BLD AUTO: 245 X10*3/UL (ref 150–450)
POTASSIUM SERPL-SCNC: 3.5 MMOL/L (ref 3.5–5.3)
RBC # BLD AUTO: 3.88 X10*6/UL (ref 4–5.2)
SODIUM SERPL-SCNC: 136 MMOL/L (ref 136–145)
WBC # BLD AUTO: 6 X10*3/UL (ref 4.4–11.3)

## 2024-08-28 PROCEDURE — 94640 AIRWAY INHALATION TREATMENT: CPT

## 2024-08-28 PROCEDURE — 80048 BASIC METABOLIC PNL TOTAL CA: CPT | Performed by: STUDENT IN AN ORGANIZED HEALTH CARE EDUCATION/TRAINING PROGRAM

## 2024-08-28 PROCEDURE — 94761 N-INVAS EAR/PLS OXIMETRY MLT: CPT

## 2024-08-28 PROCEDURE — 99233 SBSQ HOSP IP/OBS HIGH 50: CPT | Performed by: INTERNAL MEDICINE

## 2024-08-28 PROCEDURE — 36415 COLL VENOUS BLD VENIPUNCTURE: CPT | Performed by: STUDENT IN AN ORGANIZED HEALTH CARE EDUCATION/TRAINING PROGRAM

## 2024-08-28 PROCEDURE — RXMED WILLOW AMBULATORY MEDICATION CHARGE

## 2024-08-28 PROCEDURE — 99239 HOSP IP/OBS DSCHRG MGMT >30: CPT | Performed by: STUDENT IN AN ORGANIZED HEALTH CARE EDUCATION/TRAINING PROGRAM

## 2024-08-28 PROCEDURE — 2500000001 HC RX 250 WO HCPCS SELF ADMINISTERED DRUGS (ALT 637 FOR MEDICARE OP): Performed by: STUDENT IN AN ORGANIZED HEALTH CARE EDUCATION/TRAINING PROGRAM

## 2024-08-28 PROCEDURE — 2500000005 HC RX 250 GENERAL PHARMACY W/O HCPCS: Performed by: STUDENT IN AN ORGANIZED HEALTH CARE EDUCATION/TRAINING PROGRAM

## 2024-08-28 PROCEDURE — 99222 1ST HOSP IP/OBS MODERATE 55: CPT

## 2024-08-28 PROCEDURE — 2500000004 HC RX 250 GENERAL PHARMACY W/ HCPCS (ALT 636 FOR OP/ED): Performed by: STUDENT IN AN ORGANIZED HEALTH CARE EDUCATION/TRAINING PROGRAM

## 2024-08-28 PROCEDURE — 85027 COMPLETE CBC AUTOMATED: CPT | Performed by: STUDENT IN AN ORGANIZED HEALTH CARE EDUCATION/TRAINING PROGRAM

## 2024-08-28 PROCEDURE — 2500000002 HC RX 250 W HCPCS SELF ADMINISTERED DRUGS (ALT 637 FOR MEDICARE OP, ALT 636 FOR OP/ED): Performed by: STUDENT IN AN ORGANIZED HEALTH CARE EDUCATION/TRAINING PROGRAM

## 2024-08-28 RX ORDER — ASPIRIN 81 MG/1
81 TABLET ORAL DAILY
Qty: 30 TABLET | Refills: 0 | Status: SHIPPED | OUTPATIENT
Start: 2024-08-28

## 2024-08-28 RX ORDER — FUROSEMIDE 40 MG/1
40 TABLET ORAL DAILY
Status: DISCONTINUED | OUTPATIENT
Start: 2024-08-29 | End: 2024-08-28

## 2024-08-28 RX ORDER — FUROSEMIDE 40 MG/1
40 TABLET ORAL
Status: DISCONTINUED | OUTPATIENT
Start: 2024-08-29 | End: 2024-08-28 | Stop reason: HOSPADM

## 2024-08-28 RX ORDER — FUROSEMIDE 40 MG/1
40 TABLET ORAL
Qty: 60 TABLET | Refills: 1 | Status: SHIPPED | OUTPATIENT
Start: 2024-08-28

## 2024-08-28 ASSESSMENT — COGNITIVE AND FUNCTIONAL STATUS - GENERAL
DAILY ACTIVITIY SCORE: 24
MOBILITY SCORE: 24

## 2024-08-28 ASSESSMENT — PAIN - FUNCTIONAL ASSESSMENT: PAIN_FUNCTIONAL_ASSESSMENT: 0-10

## 2024-08-28 ASSESSMENT — PAIN SCALES - GENERAL: PAINLEVEL_OUTOF10: 0 - NO PAIN

## 2024-08-28 NOTE — NURSING NOTE
Discharge Note: 8/28/2024 1212 AVS and pt responsibilities reviewed with pt and copy given. Heart failure education reviewed with pt and information sheets given. Pt verbalizes understanding of instructions received, verbalizes understanding of when to seek medical attention, denies any home going or personal care needs. Denies further questions or concerns. Reviewed follow up appts with pt and verbalizes understanding. Ab TEAGUE

## 2024-08-28 NOTE — PROGRESS NOTES
Medication Education     Medication education for Brit Lopez was provided to the patient  for the following medication(s):  All medications including new Jardiance, change in Farxiga and Aspirin       Medication education provided by a Pharmacist:  ADR Counseling Dose, frequency, storage How to take and what to do if a dose is missed Proper dose, indication, possible ADRs Refilling the medication  How the medication works and benefits of taking it Benefits of taking the medication     Identified potential barriers to education:  None    Method(s) of Education:  Verbal Written materials provided and reviewed    An opportunity to ask questions and receive answers was provided.     Assessment of understanding the patient :  2= meets goals/outcomes    Additional Notes (if applicable): Meds 2 Beds was completed.     DAVID BEST, Pharmacy Student

## 2024-08-28 NOTE — DISCHARGE SUMMARY
Discharge Diagnosis  Congestive heart failure, unspecified HF chronicity, unspecified heart failure type (Multi)    Issues Requiring Follow-Up      Discharge Meds     Your medication list        CHANGE how you take these medications        Instructions Last Dose Given Next Dose Due   furosemide 40 mg tablet  Commonly known as: Lasix  What changed: when to take this      Take 1 tablet (40 mg) by mouth 2 times daily (morning and late afternoon).              CONTINUE taking these medications        Instructions Last Dose Given Next Dose Due   alpha lipoic acid 300 mg capsule           aspirin 81 mg EC tablet           carvedilol 25 mg tablet  Commonly known as: Coreg      Take 1 tablet (25 mg) by mouth 2 times a day.       cilostazol 50 mg tablet  Commonly known as: Pletal      Take 1 tablet (50 mg) by mouth 2 times a day.       CRANBERRY ORAL           estradiol 0.01 % (0.1 mg/gram) vaginal cream  Commonly known as: Estrace      Insert a pea sized amount into the vagina twice weekly       ipratropium-albuteroL 0.5-2.5 mg/3 mL nebulizer solution  Commonly known as: Duo-Neb      Take 3 mL by nebulization every 4 hours if needed for wheezing.       levothyroxine 100 mcg tablet  Commonly known as: Synthroid, Levoxyl      TAKE 1 TABLET BY MOUTH ONCE  DAILY       NIFEdipine ER 90 mg 24 hr tablet  Commonly known as: Adalat CC      Take 1 tablet (90 mg) by mouth once daily.       OMEGA 3-6-9 ORAL           oxygen gas therapy  Commonly known as: O2      Inhale 1 each once every 24 hours.       rosuvastatin 20 mg tablet  Commonly known as: Crestor      TAKE 1 TABLET BY MOUTH ONCE  DAILY       sodium bicarbonate 650 mg tablet      TAKE 2 TABLETS BY MOUTH TWICE DAILY       spironolactone 25 mg tablet  Commonly known as: Aldactone      TAKE 1 TABLET BY MOUTH DAILY       VITAMIN B COMPLEX-FOLIC ACID ORAL           Vitamin D3 5,000 Units tablet  Generic drug: cholecalciferol                     Where to Get Your Medications         These medications were sent to [facility]  [address] [place] [id number]      Hours: 8 AM to 5:30 Mon-Fri, 8 AM to 4 PM Saturday and Sunday Phone: [phone]   furosemide 40 mg tablet         Test Results Pending At Discharge  Pending Labs       No current pending labs.            Hospital Course     78-year-old obese female with a past medical history of moderate aortic stenosis, diastolic congestive heart failure, moderate mitral valve regurgitation, chronic kidney disease with a baseline creatinine around 1.5, recent pneumonia with residual nocturnal hypoxia, coronary artery disease/MI, hypertension, rectal incontinence, hyperlipidemia, anemia, hypothyroidism, solitary kidney and peripheral artery disease who presented to the emergency room for 3 days history of worsening shortness of breath and wheezing. Patient was found to have an acute CHF exacerbation in the setting of acute pulmonary edema and associated with an acute hypoxemic respiratory failure .On presentation, blood pressure 129/47, heart rate 97, respiratory rate 20, afebrile, saturation oxygen 88% on room air. Pertinent findings on blood workup; hemoglobin 11.6, , creatinine 1.49, and bilirubin 1.8. Chest x-ray showed cardiomegaly with pulmonary vascular congestive changes and edema. Patient was given in the emergency room IV Lasix and placed on the BiPAP and admitted to the medical service for further investigation and management     During the  course in the hospital patient was admitted to ICU.  She was placed on a BiPAP.  She had IV Lasix given.  Cardiology was consulted.  Patient improved with IV diuresis.  She was also added SGLT2 inhibitor for optimization of heart failure.  Patient had moderate attic stenosis and probably her recurrent heart failure exacerbation could be because of her ongoing moderate aortic stenosis.  Which needs further evaluation.  Patient understands and she wants to follow-up with a  cardiologist as outpatient.  Patient does not require oxygen at rest.  She will need oxygen at night with her CPAP.  Also per nephrology recommendation her Lasix was increased to twice daily.  Rest of the medications to be continued as such.  Patient vitals main stable during the course in the hospital    patient discharged home in satisfactory condition    Pertinent Physical Exam At Time of Discharge  Physical Exam  Physical Exam  Constitutional:       Appearance: Normal appearance.   HENT:      Head: Normocephalic and atraumatic.      Right Ear: Tympanic membrane normal.      Nose: Nose normal.      Mouth/Throat:      Mouth: Mucous membranes are moist.   Eyes:      Pupils: Pupils are equal, round, and reactive to light.   Cardiovascular:      Rate and Rhythm: Normal rate.      Pulses: Normal pulses.   Pulmonary:      Breath sounds: Crackles present improved since admission.   Abdominal:      Palpations: Abdomen is soft.   Musculoskeletal:         General: Normal range of motion.      Cervical back: Normal range of motion.   Skin:     General: Skin is dry.   Neurological:      General: No focal deficit present.      Mental Status: She is alert.   Psychiatric:         Mood and Affect: Mood normal.  Outpatient Follow-Up  Future Appointments   Date Time Provider Department Center   9/3/2024 11:15 AM [facility]   9/10/2024 10:00 AM Rhina Chandra MD [id number] [facility]   9/11/2024 10:40 AM Álvaro Sterling MD [id number] None   10/7/2024  1:30 PM Tr Piña DO [id number] [facility]   11/4/2024 11:30 AM Vincent Cain DO [id number] [facility]   4/23/2025 11:30 AM Margaret Hanley MD [id number] [facility]         Dwayne Jacobs MD

## 2024-08-28 NOTE — PROGRESS NOTES
"Brit Lopez \"Valorie\" is a 78 y.o. female on day 3 of admission presenting with Congestive heart failure, unspecified HF chronicity, unspecified heart failure type (Multi).      Subjective   Seen and examined at the bedside this morning  Sitting comfortably in bedside chair  Feeling well  Denies shortness of breath       Objective          Vitals 24HR  Heart Rate:  [71-81]   Temp:  [35.9 °C (96.6 °F)-36.9 °C (98.4 °F)]   Resp:  [16-20]   BP: (107-134)/(54-69)   SpO2:  [89 %-98 %]         Intake/Output last 3 Shifts:    Intake/Output Summary (Last 24 hours) at 8/28/2024 1131  Last data filed at 8/28/2024 0900  Gross per 24 hour   Intake 700 ml   Output 2125 ml   Net -1425 ml       Physical Exam  Constitutional:       Appearance: Normal appearance. She is obese.   HENT:      Head: Normocephalic and atraumatic.      Right Ear: External ear normal.      Left Ear: External ear normal.      Nose: Nose normal.      Mouth/Throat:      Mouth: Mucous membranes are moist.      Pharynx: Oropharynx is clear.   Eyes:      Extraocular Movements: Extraocular movements intact.      Conjunctiva/sclera: Conjunctivae normal.      Pupils: Pupils are equal, round, and reactive to light.   Cardiovascular:      Rate and Rhythm: Normal rate and regular rhythm.      Heart sounds: Murmur heard.   Pulmonary:      Effort: Pulmonary effort is normal.      Breath sounds: Rales present.   Abdominal:      General: Abdomen is flat.      Palpations: Abdomen is soft.   Skin:     General: Skin is warm and dry.   Neurological:      General: No focal deficit present.      Mental Status: She is alert and oriented to person, place, and time.   Psychiatric:         Mood and Affect: Mood normal.         Behavior: Behavior normal.         Relevant Results               Assessment/Plan              Assessment & Plan  Congestive heart failure, unspecified HF chronicity, unspecified heart failure type (Multi)    Chronic kidney disease Stage IIIb/IV: With " baseline creatinine 1.5-1.8  Hypertension  Normocytic anemia   Obstructive sleep apnea with use of CPAP  Coronary artery disease with 3 stents placed in past  Hypothyroidism  Hyperlipidemia  Peripheral arterial disease  Obesity  Vitamin D Deficiency  R Renal Atrophy with GFR of 18% and Nephrectomy on 2/1/2019  Diastolic congestive heart failure: Acute on chronic  Moderate aortic stenosis    Plan:  At this time clinically she seems pretty stable  Volume status appears good  I have discussed with her.  She can be discharged to home from my standpoint  She will continue Lasix twice a day  This is an increase but she has had multiple recurrences and so we will increase her Lasix  She is also going to reach out to Dr. Meza.  She follows with him and I have asked her to call him and discuss her aortic valve with him  Follow-up in a couple of weeks to make sure renal function is stable with her increased Lasix  Please call with any further issues or needs  I did tell her she needs to weigh herself daily and record it and call me if she has any issues             Vincent Cain, DO

## 2024-08-28 NOTE — PROGRESS NOTES
Per medical team, patient is not yet medically appropriate for discharge today; will likely require another 24 hours in the hospital.  Patient's current plan is to return home with patient's  when medically ready, with new services to begin through [facility], and with no additional Care Transitions needs foreseen. Care Transitions to follow and assist should any new needs arise. ALEX Espinosa

## 2024-08-28 NOTE — CARE PLAN
Problem: Discharge Planning  Goal: Discharge to home or other facility with appropriate resources  Outcome: Progressing     Problem: Chronic Conditions and Co-morbidities  Goal: Patient's chronic conditions and co-morbidity symptoms are monitored and maintained or improved  Outcome: Progressing     Problem: Heart Failure  Goal: Improved gas exchange this shift  Outcome: Progressing  Goal: Reduction in peripheral edema within 24 hours  Outcome: Progressing   The patient's goals for the shift include no falls    The clinical goals for the shift include less short of breath    Over the shift, the patient did not make progress toward the following goals. Barriers to progression include . Recommendations to address these barriers include .

## 2024-08-28 NOTE — PROGRESS NOTES
Cardiology Inpatient Progress Note  [facility] [place]    ASSESSMENT AND PLAN  Heart failure with preserved ejection fraction  Aortic Stenosis   -Echocardiogram 6/21/2024 shows normal LV systolic function with estimated LVEF of 65 to 70%, grade 2 LV diastolic dysfunction, normal RV systolic function, mild to moderate MR and moderate aortic stenosis.  -Current heart failure GDMT limited by chronic kidney disease, she is currently on carvedilol 20 mg twice a day and Farxiga 10 mg daily.  Unable to start MRA and ACE/ARB/ARNI due to eGFR less than 30.   -She has had recurrent admissions for decompensated heart failure, is possible her echo is underestimating the severity of her aortic stenosis.  Suggest consultation with structural heart team for invasive valve area assessment for possible TAVR.  -Warm and dry on exam  -I will change her Lasix to 40 mg p.o. daily  -Stable for discharge from cardiac standpoint.  She follows with Dr. Domingo with [facility] cardiology and prefers to follow-up with him. I instructed her to follow-up with her cardiology practice to be seen within 2 weeks.    Subjective  denies chest pain, shortness of breath, palpitations, leg edema, fever, chills, orthopnea, paroxysmal nocturnal dyspnea or syncope.   No acute events recorded overnight    Objective:  Intake & Output  Net IO Since Admission: -3,865 mL [08/28/24 0911]    Today's Weight:  Vitals:    08/27/24 0400   Weight: 86.7 kg (191 lb 2.2 oz)       PHYSICAL EXAM  Physical Exam  Vitals and nursing note reviewed.   Constitutional:       General: She is not in acute distress.  HENT:      Head: Normocephalic and atraumatic.      Mouth/Throat:      Mouth: Mucous membranes are moist.      Pharynx: Oropharynx is clear.   Eyes:      General: No scleral icterus.     Pupils: Pupils are equal, round, and reactive to light.   Cardiovascular:      Rate and Rhythm: Normal rate and regular rhythm.       Pulses: Normal pulses.      Heart sounds: Normal heart sounds, S1 normal and S2 normal. No murmur heard.     No friction rub.   Pulmonary:      Effort: Pulmonary effort is normal.      Breath sounds: Normal breath sounds.   Abdominal:      General: Bowel sounds are normal. There is no distension.      Palpations: Abdomen is soft.      Tenderness: There is no abdominal tenderness.   Musculoskeletal:         General: No swelling. Normal range of motion.      Cervical back: Normal range of motion and neck supple.      Right lower leg: No edema.      Left lower leg: No edema.   Skin:     General: Skin is warm and dry.      Capillary Refill: Capillary refill takes less than 2 seconds.      Findings: No rash.   Neurological:      General: No focal deficit present.      Mental Status: She is alert.   Psychiatric:         Mood and Affect: Mood normal.         Behavior: Behavior normal.        Labs:     CMP:  Recent Labs     08/28/24  0425 08/27/24  0513 08/26/24  0429 08/25/24  2044 08/11/24  0439 06/22/24  0420 06/21/24  0517 06/03/24  1130 12/04/23  1144 09/20/23  0931 06/06/23  0845 12/05/22  0940 02/15/22  1107 01/03/22  1222    138 138 136 139   < > 138   < > 141 141   < > 140   < > 136   K 3.5 3.6 3.7 4.1 4.2   < > 4.0   < > 4.2 4.3   < > 4.1   < > 4.1    105 106 105 105   < > 106   < > 107 109*   < > 110*   < > 105   CO2 24 23 24 22 26   < > 24   < > 27 24   < > 20*   < > 21   ANIONGAP 13 14 12 13 12   < > 12   < > 11 12   < > 14   < > 14   BUN 35* 33* 24* 22 46*   < > 30*   < > 19 21   < > 24*   < > 26*   CREATININE 1.80* 1.64* 1.43* 1.49* 1.74*   < > 1.47*   < > 1.58* 1.51*   < > 1.83*   < > 1.57*   EGFR 28* 32* 37* 36* 30*   < > 36*   < > 33*  --   --   --   --   --    MG  --   --   --   --   --   --  2.16  --  2.11 2.08  --  2.01  --  1.96    < > = values in this interval not displayed.     Recent Labs     08/26/24  0429 08/25/24  2044 08/09/24  1231 06/19/24  0734 06/03/24  1130   ALBUMIN 3.5 3.7 3.9  "4.0 4.4   ALKPHOS 37 41 52 63 62   ALT 15 17 17 18 23   AST 18 23 20 22 26   BILITOT 1.6* 1.8* 1.4* 1.1 1.0   LIPASE  --   --  49  --   --      CBC:  Recent Labs     08/28/24  0425 08/25/24  2044 08/10/24  0503 08/09/24  1231 07/25/24  0938   WBC 6.0 10.0 6.2 8.2 5.9   HGB 12.0 11.6* 12.1 12.0 13.2   HCT 35.4* 34.3* 36.8 35.6* 40.7    205 201 195 264   MCV 91 92 94 94 96     COAG:   Recent Labs     06/19/24  0734 01/03/22  1222   INR  --  1.0   DDIMERVTE 1,328* 1,120*     ABO: No results for input(s): \"ABO\" in the last 22391 hours.  HEME/ENDO:  Recent Labs     08/26/24  0429 08/11/24  0439 02/26/24  1128 10/19/21  1300 12/09/20  0859   TSH 0.20*  --  0.39* 0.38* 0.10*   HGBA1C  --  6.2* 6.1*  --   --       CARDIAC:   Recent Labs     08/25/24 2044 08/10/24  0428 08/09/24  1816 08/09/24  1430 08/09/24  1231 06/20/24  1436 06/19/24  0838 06/19/24  0734   TROPHS 22* 23* 43*  --  20*  --  11 11   *  --   --  404*  --  632*  --  368*     Recent Labs     06/03/24  1130 12/12/20  0855   CHOL 171 159   LDLF  --  62   HDL 45.0 45.0   TRIG 205* 261*       Inpatient Medications:    Current Facility-Administered Medications:     acetaminophen (Tylenol) tablet 650 mg, 650 mg, oral, q4h PRN **OR** acetaminophen (Tylenol) oral liquid 650 mg, 650 mg, oral, q4h PRN **OR** acetaminophen (Tylenol) suppository 650 mg, 650 mg, rectal, q4h PRN, Dwayne Jacobs MD    aspirin EC tablet 81 mg, 81 mg, oral, Daily, Dwayne Jacobs MD, 81 mg at 08/27/24 0908    carvedilol (Coreg) tablet 25 mg, 25 mg, oral, BID, Dwayne Jacobs MD, 25 mg at 08/27/24 2033    cilostazol (Pletal) tablet 50 mg, 50 mg, oral, BID, Dwayne Jacobs MD, 50 mg at 08/27/24 2033    dapagliflozin propanediol (Farxiga) tablet 10 mg, 10 mg, oral, q24h, Dwayne Jacobs MD, 10 mg at 08/27/24 1421    furosemide (Lasix) injection 40 mg, 40 mg, intravenous, q12h, Dwayne Jacobs MD, 40 mg at 08/28/24 0628    heparin (porcine) " injection 5,000 Units, 5,000 Units, subcutaneous, q8h, Dwayne Jacobs MD, 5,000 Units at 08/25/24 2309    ipratropium-albuteroL (Duo-Neb) 0.5-2.5 mg/3 mL nebulizer solution 3 mL, 3 mL, nebulization, 4x daily PRN, Dwayne Jacobs MD, 3 mL at 08/28/24 0614    levothyroxine (Synthroid, Levoxyl) tablet 100 mcg, 100 mcg, oral, Daily, Dwayne aJcobs MD, 100 mcg at 08/27/24 2033    magnesium hydroxide (Milk of Magnesia) 2,400 mg/10 mL suspension 10 mL, 10 mL, oral, Daily PRN, Dwayne Jacobs MD    NIFEdipine ER (Adalat CC) 24 hr tablet 90 mg, 90 mg, oral, Daily, Dwayne Jacobs MD, 90 mg at 08/27/24 0908    ondansetron ODT (Zofran-ODT) disintegrating tablet 4 mg, 4 mg, oral, q8h PRN **OR** ondansetron (Zofran) injection 4 mg, 4 mg, intravenous, q8h PRN, Dwayne Jacobs MD    oxygen (O2) therapy, , inhalation, Continuous PRN - O2/gases, Dwayne Jacobs MD, 2 L/min at 08/28/24 0614     VITALS  Vitals:    08/28/24 0749   BP: 107/58   Pulse: 80   Resp: 16   Temp: 36.3 °C (97.3 °F)   SpO2: 95%       Cardiology will sign-off.      Thank you for this interesting clinical case and allowing me to participate in the care of this patient.  Please reach me out if you have any questions or if you need any clarifications regarding the patient's care.    **Disclaimer: This note was dictated by speech recognition, and every effort has been made to prevent any error in transcription, however minor errors may be present**  _________________________________________________________  lÁvaro Wagoner, MSN, CNP, ACNPC, CCRN  Division of Cardiovascular Medicine  [facility] [place]  [facility]

## 2024-08-29 ENCOUNTER — TELEPHONE (OUTPATIENT)
Dept: NEPHROLOGY | Facility: CLINIC | Age: 79
End: 2024-08-29
Payer: MEDICARE

## 2024-08-29 NOTE — PROGRESS NOTES
Pt called in stating that Dr. Cain had ordered Farxiga for her during her most recent hospital stay. She stated that the pharmacy switched her to Jardiance instead because they said her insurance won't pay for Farxiga. She would like to make sure this change is ok with Dr. Cain and that it is ok for her to take Jardiance instead of Farxiga.

## 2024-08-30 ENCOUNTER — PATIENT OUTREACH (OUTPATIENT)
Dept: HOME HEALTH SERVICES | Age: 79
End: 2024-08-30
Payer: MEDICARE

## 2024-08-30 ENCOUNTER — PATIENT OUTREACH (OUTPATIENT)
Age: 79
End: 2024-08-30
Payer: MEDICARE

## 2024-08-30 NOTE — PROGRESS NOTES
Discharge Facility:[facility]  Discharge Diagnosis:CHF  Admission Date:8/25/24  Discharge Date:8/28/24    PCP Appointment Date:No contact made. Task sent to office  Specialist Appointment Date: General Surgery 9/10/24, Gastroenterology 10/17/24, Nephrology 11/4/24, OBGYN 4/23/25  Hospital Encounter and Summary Linked: Yes    2 call attempts made

## 2024-09-03 ENCOUNTER — OFFICE VISIT (OUTPATIENT)
Dept: NEPHROLOGY | Facility: CLINIC | Age: 79
End: 2024-09-03
Payer: MEDICARE

## 2024-09-03 ENCOUNTER — HOSPITAL ENCOUNTER (OUTPATIENT)
Dept: RADIOLOGY | Facility: HOSPITAL | Age: 79
Discharge: HOME | End: 2024-09-03
Payer: MEDICARE

## 2024-09-03 ENCOUNTER — APPOINTMENT (OUTPATIENT)
Dept: NEPHROLOGY | Facility: CLINIC | Age: 79
End: 2024-09-03
Payer: MEDICARE

## 2024-09-03 VITALS
BODY MASS INDEX: 31.1 KG/M2 | SYSTOLIC BLOOD PRESSURE: 140 MMHG | DIASTOLIC BLOOD PRESSURE: 68 MMHG | HEIGHT: 64 IN | HEART RATE: 70 BPM | WEIGHT: 182.2 LBS

## 2024-09-03 DIAGNOSIS — I10 PRIMARY HYPERTENSION: ICD-10-CM

## 2024-09-03 DIAGNOSIS — N18.32 STAGE 3B CHRONIC KIDNEY DISEASE (MULTI): Primary | ICD-10-CM

## 2024-09-03 DIAGNOSIS — Z12.31 ENCOUNTER FOR SCREENING MAMMOGRAM FOR MALIGNANT NEOPLASM OF BREAST: ICD-10-CM

## 2024-09-03 PROCEDURE — 99214 OFFICE O/P EST MOD 30 MIN: CPT | Performed by: INTERNAL MEDICINE

## 2024-09-03 PROCEDURE — 1159F MED LIST DOCD IN RCRD: CPT | Performed by: INTERNAL MEDICINE

## 2024-09-03 PROCEDURE — 3078F DIAST BP <80 MM HG: CPT | Performed by: INTERNAL MEDICINE

## 2024-09-03 PROCEDURE — 3077F SYST BP >= 140 MM HG: CPT | Performed by: INTERNAL MEDICINE

## 2024-09-03 PROCEDURE — 1160F RVW MEDS BY RX/DR IN RCRD: CPT | Performed by: INTERNAL MEDICINE

## 2024-09-03 PROCEDURE — 1111F DSCHRG MED/CURRENT MED MERGE: CPT | Performed by: INTERNAL MEDICINE

## 2024-09-03 PROCEDURE — 1036F TOBACCO NON-USER: CPT | Performed by: INTERNAL MEDICINE

## 2024-09-03 PROCEDURE — 77063 BREAST TOMOSYNTHESIS BI: CPT | Performed by: RADIOLOGY

## 2024-09-03 PROCEDURE — 77067 SCR MAMMO BI INCL CAD: CPT

## 2024-09-03 PROCEDURE — 77067 SCR MAMMO BI INCL CAD: CPT | Performed by: RADIOLOGY

## 2024-09-03 ASSESSMENT — ENCOUNTER SYMPTOMS
NEUROLOGICAL NEGATIVE: 1
CARDIOVASCULAR NEGATIVE: 1
ACTIVITY CHANGE: 1
RESPIRATORY NEGATIVE: 1
NAUSEA: 1
EYES NEGATIVE: 1
ALLERGIC/IMMUNOLOGIC NEGATIVE: 1
MUSCULOSKELETAL NEGATIVE: 1
PSYCHIATRIC NEGATIVE: 1
HEMATOLOGIC/LYMPHATIC NEGATIVE: 1
ENDOCRINE NEGATIVE: 1

## 2024-09-03 NOTE — PROGRESS NOTES
"Subjective   She is not feeling well  She states she is not doing well after Jardiance  She has no swelling  Her breathing feels good    Patient ID: Brit Lopez \"Robert" is a 78 y.o. female who presents for Follow-up (Hospital/Discuss Jardiance - C/O vertigo, nausea).  HPI  She is here for follow-up after a recent hospitalization.  She has actually had a couple repeat hospitalizations due to shortness of breath that has come on pretty acutely for her.  Her labs were drawn last 6 days ago when she was discharged her BUN is 35 creatinine of 1.8 estimated GFR is 28  On discharge she was discharged with Lasix twice a day instead of once a day.  Her medications are reviewed she is on aspirin Coreg vitamin D SGLT2 fish oil Lasix levothyroxine nifedipine a statin sodium bicarb spironolactone  Blood pressure here in the office today is 140/68  I believe we discussed with her on discharge to not take the sodium bicarbonate  I have also asked her to discuss with Dr. Pappas her aortic stenosis and if this plays any role in her issues lately    Review of Systems   Constitutional:  Positive for activity change.   HENT: Negative.     Eyes: Negative.    Respiratory: Negative.     Cardiovascular: Negative.    Gastrointestinal:  Positive for nausea.   Endocrine: Negative.    Genitourinary: Negative.    Musculoskeletal: Negative.    Skin: Negative.    Allergic/Immunologic: Negative.    Neurological: Negative.    Hematological: Negative.    Psychiatric/Behavioral: Negative.         Objective   Physical Exam  Constitutional:       Appearance: Normal appearance. She is obese.   HENT:      Head: Normocephalic and atraumatic.      Right Ear: External ear normal.      Left Ear: External ear normal.      Nose: Nose normal.      Mouth/Throat:      Mouth: Mucous membranes are moist.      Pharynx: Oropharynx is clear.   Eyes:      Extraocular Movements: Extraocular movements intact.      Conjunctiva/sclera: Conjunctivae normal.      " Pupils: Pupils are equal, round, and reactive to light.   Cardiovascular:      Rate and Rhythm: Normal rate and regular rhythm.   Pulmonary:      Effort: Pulmonary effort is normal.      Breath sounds: Normal breath sounds.   Abdominal:      General: Abdomen is flat.      Palpations: Abdomen is soft.   Musculoskeletal:         General: No swelling.   Skin:     General: Skin is warm and dry.   Neurological:      General: No focal deficit present.      Mental Status: She is alert and oriented to person, place, and time.   Psychiatric:         Mood and Affect: Mood normal.         Behavior: Behavior normal.         Assessment/Plan   Problem List Items Addressed This Visit             ICD-10-CM    Hypertension I10    Chronic kidney disease (CKD), stage III (moderate) (Multi) - Primary N18.30    Relevant Orders    Follow Up In Nephrology    Basic metabolic panel    Urinalysis with Reflex Microscopic    Albumin-Creatinine Ratio, Urine Random   Plan:   Stop Jardiance.  She has not responded well to it  Continue Bicarb daily for now  Labs in 1 month  Call with issues  Chronic kidney disease Stage IIIb/IV: With baseline creatinine 1.5-1.8  Hypertension  Normocytic anemia   Obstructive sleep apnea with use of CPAP  Coronary artery disease with 3 stents placed in past  Hypothyroidism  Hyperlipidemia  Peripheral arterial disease  Obesity  Vitamin D Deficiency  R Renal Atrophy with GFR of 18% and Nephrectomy on 2/1/2019  Diastolic congestive heart failure: Acute on chronic  Moderate aortic stenosis       Vincent Cain DO 09/03/24 1:02 PM

## 2024-09-10 ENCOUNTER — APPOINTMENT (OUTPATIENT)
Dept: SURGERY | Facility: CLINIC | Age: 79
End: 2024-09-10
Payer: MEDICARE

## 2024-09-10 VITALS
HEIGHT: 64 IN | DIASTOLIC BLOOD PRESSURE: 70 MMHG | BODY MASS INDEX: 32.3 KG/M2 | HEART RATE: 81 BPM | WEIGHT: 189.2 LBS | SYSTOLIC BLOOD PRESSURE: 140 MMHG

## 2024-09-10 DIAGNOSIS — Z12.31 BREAST CANCER SCREENING BY MAMMOGRAM: Primary | ICD-10-CM

## 2024-09-10 PROCEDURE — 1111F DSCHRG MED/CURRENT MED MERGE: CPT | Performed by: SURGERY

## 2024-09-10 PROCEDURE — 99213 OFFICE O/P EST LOW 20 MIN: CPT | Performed by: SURGERY

## 2024-09-10 PROCEDURE — 1159F MED LIST DOCD IN RCRD: CPT | Performed by: SURGERY

## 2024-09-10 PROCEDURE — 3078F DIAST BP <80 MM HG: CPT | Performed by: SURGERY

## 2024-09-10 PROCEDURE — 3077F SYST BP >= 140 MM HG: CPT | Performed by: SURGERY

## 2024-09-10 PROCEDURE — 1036F TOBACCO NON-USER: CPT | Performed by: SURGERY

## 2024-09-11 ENCOUNTER — APPOINTMENT (OUTPATIENT)
Dept: PRIMARY CARE | Facility: CLINIC | Age: 79
End: 2024-09-11
Payer: MEDICARE

## 2024-09-11 VITALS
SYSTOLIC BLOOD PRESSURE: 130 MMHG | OXYGEN SATURATION: 97 % | BODY MASS INDEX: 32.37 KG/M2 | HEIGHT: 64 IN | WEIGHT: 189.6 LBS | HEART RATE: 83 BPM | DIASTOLIC BLOOD PRESSURE: 68 MMHG

## 2024-09-11 DIAGNOSIS — E78.2 MIXED HYPERLIPIDEMIA: ICD-10-CM

## 2024-09-11 DIAGNOSIS — Z00.00 ENCOUNTER FOR MEDICARE ANNUAL WELLNESS EXAM: ICD-10-CM

## 2024-09-11 DIAGNOSIS — I73.9 PVD (PERIPHERAL VASCULAR DISEASE) (CMS-HCC): ICD-10-CM

## 2024-09-11 DIAGNOSIS — I10 HTN (HYPERTENSION), BENIGN: ICD-10-CM

## 2024-09-11 DIAGNOSIS — G47.33 OSA ON CPAP: Primary | ICD-10-CM

## 2024-09-11 DIAGNOSIS — R73.02 IGT (IMPAIRED GLUCOSE TOLERANCE): ICD-10-CM

## 2024-09-11 DIAGNOSIS — I10 PRIMARY HYPERTENSION: ICD-10-CM

## 2024-09-11 DIAGNOSIS — J18.9 PNEUMONIA OF BOTH LOWER LOBES DUE TO INFECTIOUS ORGANISM: ICD-10-CM

## 2024-09-11 DIAGNOSIS — E03.9 ACQUIRED HYPOTHYROIDISM: ICD-10-CM

## 2024-09-11 DIAGNOSIS — I21.9 ACUTE MYOCARDIAL INFARCTION, UNSPECIFIED MI TYPE, UNSPECIFIED ARTERY (MULTI): ICD-10-CM

## 2024-09-11 DIAGNOSIS — N18.32 STAGE 3B CHRONIC KIDNEY DISEASE (MULTI): ICD-10-CM

## 2024-09-11 DIAGNOSIS — R06.09 DOE (DYSPNEA ON EXERTION): ICD-10-CM

## 2024-09-11 PROCEDURE — 1036F TOBACCO NON-USER: CPT | Performed by: FAMILY MEDICINE

## 2024-09-11 PROCEDURE — 3075F SYST BP GE 130 - 139MM HG: CPT | Performed by: FAMILY MEDICINE

## 2024-09-11 PROCEDURE — 1160F RVW MEDS BY RX/DR IN RCRD: CPT | Performed by: FAMILY MEDICINE

## 2024-09-11 PROCEDURE — 3078F DIAST BP <80 MM HG: CPT | Performed by: FAMILY MEDICINE

## 2024-09-11 PROCEDURE — 1111F DSCHRG MED/CURRENT MED MERGE: CPT | Performed by: FAMILY MEDICINE

## 2024-09-11 PROCEDURE — 1159F MED LIST DOCD IN RCRD: CPT | Performed by: FAMILY MEDICINE

## 2024-09-11 PROCEDURE — 99214 OFFICE O/P EST MOD 30 MIN: CPT | Performed by: FAMILY MEDICINE

## 2024-09-11 RX ORDER — IPRATROPIUM BROMIDE AND ALBUTEROL SULFATE 2.5; .5 MG/3ML; MG/3ML
3 SOLUTION RESPIRATORY (INHALATION) EVERY 4 HOURS PRN
Qty: 120 ML | Refills: 3 | Status: SHIPPED | OUTPATIENT
Start: 2024-09-11 | End: 2025-09-11

## 2024-09-11 RX ORDER — CARVEDILOL 25 MG/1
25 TABLET ORAL 2 TIMES DAILY
Qty: 180 TABLET | Refills: 3 | Status: SHIPPED | OUTPATIENT
Start: 2024-09-11 | End: 2025-09-11

## 2024-09-11 RX ORDER — CARVEDILOL 25 MG/1
25 TABLET ORAL 2 TIMES DAILY
Qty: 180 TABLET | Refills: 3 | Status: CANCELLED | OUTPATIENT
Start: 2024-09-11 | End: 2025-09-11

## 2024-09-11 RX ORDER — NIFEDIPINE 90 MG/1
90 TABLET, EXTENDED RELEASE ORAL DAILY
Qty: 90 TABLET | Refills: 3 | Status: SHIPPED | OUTPATIENT
Start: 2024-09-11 | End: 2025-09-11

## 2024-09-11 RX ORDER — NIFEDIPINE 90 MG/1
90 TABLET, EXTENDED RELEASE ORAL DAILY
Qty: 90 TABLET | Refills: 3 | Status: CANCELLED | OUTPATIENT
Start: 2024-09-11 | End: 2025-09-11

## 2024-09-11 NOTE — PROGRESS NOTES
General Surgery Consultation    Patient: Brit Lopez  : 1945  MRN: [id number]  Date of Consultation: 09/10/24    Primary Care Provider: Álvaro Sterling MD    Chief Complaint: Breast cancer and mammogram follow-up    History of Present Illness: Brit Lopez is a 78 y.o. old female seen in breast cancer and mammogram follow-up.  She denies any change in her breast self-exam.  She states she has had some problems recently with her heart and her kidneys and his had multiple admissions for CHF.  She states the medicines have made her dizzy so she stopped taking them.  She sees Dr. Roldan tomorrow and will discuss it further with him.  She follows with Dr. Biggs and will see him in January for repeat echo and possible With the valve issue.    Medical History:  Past Medical History:   Diagnosis Date    Old myocardial infarction 2018    History of myocardial infarction    Personal history of malignant neoplasm of breast     History of malignant neoplasm of breast    Personal history of other diseases of the circulatory system 2018    History of hypertension    Personal history of other diseases of the female genital tract 2018    History of uterine prolapse    Personal history of other endocrine, nutritional and metabolic disease 2018    History of hyperlipidemia    Personal history of other medical treatment     H/O mammogram       Surgical History:  Past Surgical History:   Procedure Laterality Date    APPENDECTOMY  2018    Appendectomy    BI MAMMO BILATERAL SCREENING  2023    cat 2    BI MAMMO BILATERAL SCREENING Bilateral 2024    CAT 2    CATARACT EXTRACTION Bilateral 2024    COLONOSCOPY  2018    REPEAT 10YRS/DR MELGAR    INCISIONAL BREAST BIOPSY  2018    Incisional Breast Biopsy    OTHER SURGICAL HISTORY  2018    Breast Surgery Revision Of Reconstructed Right Breast    OTHER SURGICAL HISTORY  2021    Hip replacement    OTHER  SURGICAL HISTORY  12/13/2019    Tubal ligation    OTHER SURGICAL HISTORY  12/13/2019    Kidney surgery    OTHER SURGICAL HISTORY  12/13/2019    Hysterectomy    OTHER SURGICAL HISTORY  12/13/2019    Cardiac catheterization with stent placement    OTHER SURGICAL HISTORY  12/13/2019    Cardiac catheterization    OTHER SURGICAL HISTORY  12/13/2019    Myringotomy with tube placement    OTHER SURGICAL HISTORY  06/11/2009    Right mastectomy with SLNB     ER/AK +  HER -2       Home Medications:  Prior to Admission medications    Medication Sig Start Date End Date Taking? Authorizing Provider   alpha lipoic acid 300 mg capsule Take 1 capsule by mouth once daily.   Yes Historical Provider, MD   aspirin 81 mg EC tablet Take 1 tablet (81 mg) by mouth once daily. 8/28/24  Yes Dwayne Jacobs MD   cholecalciferol (Vitamin D3) 5,000 Units tablet Take 1 tablet (5,000 Units) by mouth 2 times a day.   Yes Historical Provider, MD   cilostazol (Pletal) 50 mg tablet Take 1 tablet (50 mg) by mouth 2 times a day. 8/2/24  Yes Álvaor Sterling MD   CRANBERRY ORAL Take 1 capsule by mouth once daily.   Yes Historical Provider, MD   estradiol (Estrace) 0.01 % (0.1 mg/gram) vaginal cream Insert a pea sized amount into the vagina twice weekly 12/7/23  Yes Margaret Hanley MD   fish oil/borage/flax/om3,6,9 1 (OMEGA 3-6-9 ORAL) Take 1 tablet by mouth once daily.   Yes Historical Provider, MD   furosemide (Lasix) 40 mg tablet Take 1 tablet (40 mg) by mouth 2 times daily (morning and late afternoon). 8/28/24  Yes Dwayne Jacobs MD   levothyroxine (Synthroid, Levoxyl) 100 mcg tablet TAKE 1 TABLET BY MOUTH ONCE  DAILY 8/2/24  Yes Álvaro Sterling MD   oxygen (O2) gas therapy Inhale 1 each once every 24 hours. 8/11/24  Yes Dwayne Jacobs MD   rosuvastatin (Crestor) 20 mg tablet TAKE 1 TABLET BY MOUTH ONCE  DAILY 8/2/24  Yes Álvaro Sterling MD   sodium bicarbonate 650 mg tablet TAKE 2 TABLETS BY MOUTH TWICE  DAILY 5/14/24  Yes Tanja Cain, APRN-CNP, DNP   spironolactone (Aldactone) 25 mg tablet TAKE 1 TABLET BY MOUTH DAILY 3/15/24  Yes Vincent Cain,    VITAMIN B COMPLEX-FOLIC ACID ORAL Take 1 tablet by mouth once daily.   Yes Historical Provider, MD   carvedilol (Coreg) 25 mg tablet Take 1 tablet (25 mg) by mouth 2 times a day. 9/5/23 9/4/24  Álvaro Sterling MD   ipratropium-albuteroL (Duo-Neb) 0.5-2.5 mg/3 mL nebulizer solution Take 3 mL by nebulization every 4 hours if needed for wheezing. 6/23/24 8/9/24  Hari VALADEZ MD   NIFEdipine XL 90 mg 24 hr tablet Take 1 tablet (90 mg) by mouth once daily. 9/5/23 9/4/24  Álvaro Sterling MD       Allergies:  Allergies   Allergen Reactions    Amoxicillin-Pot Clavulanate Nausea/vomiting     vomiting    Citalopram Unknown    Hydralazine GI Upset and Unknown    Levofloxacin Cardiac arrhythmia/arrest    Nitrofurantoin Monohyd/M-Cryst Hives and Itching    Sulfa (Sulfonamide Antibiotics) Unknown    Thiazides Hives and Rash     is allergic to amoxicillin-pot clavulanate, citalopram, hydralazine, levofloxacin, nitrofurantoin monohyd/m-cryst, sulfa (sulfonamide antibiotics), and thiazides.    Family History:   Family History   Problem Relation Name Age of Onset    Hypertension Mother      Heart attack Mother      Heart disease Mother      Skin cancer Father      Heart attack Father      Kidney disease Father      Heart disease Father      Asthma Sister      Hypertension Daughter         Social History:  Social History     Socioeconomic History    Marital status:    Tobacco Use    Smoking status: Never    Smokeless tobacco: Never   Vaping Use    Vaping status: Never Used   Substance and Sexual Activity    Alcohol use: Yes    Drug use: Never    Sexual activity: Defer     Social Determinants of Health     Financial Resource Strain: Low Risk  (8/26/2024)    Overall Financial Resource Strain (CARDIA)     Difficulty of Paying Living Expenses: Not hard at all  "  Transportation Needs: No Transportation Needs (8/26/2024)    PRAPARE - Transportation     Lack of Transportation (Medical): No     Lack of Transportation (Non-Medical): No   Housing Stability: Low Risk  (8/26/2024)    Housing Stability Vital Sign     Unable to Pay for Housing in the Last Year: No     Number of Times Moved in the Last Year: 0     Homeless in the Last Year: No       ROS:  Constitutional:  no fever, sweats, and chills  Cardiovascular: No chest pain  Respiratory: No cough or shortness of breath  Gastrointestinal: Occasional diarrhea  Genitourinary: no dysuria  Musculoskeletal: no weakness or swelling  Integumentary: no rashes  Neurological: no confusion  Endocrine: no heat or cold intolerance  Heme/Lymph: no easy bruising or bleeding    Objective:  /70   Pulse 81   Ht 1.626 m (5' 4\")   Wt 85.8 kg (189 lb 3.2 oz)   BMI 32.48 kg/m²     Physical Exam:  Constitutional: No acute distress, conversant, pleasant  Neurologic: alert and oriented  Psych: appropriate affect  Ears, Nose, Mouth and Throat: mucus membranes moist  Pulmonary: No labored breathing  Cardiovascular: Regular rate and rhythm  Abdomen: soft, non-distended, non-tender  Musculoskeletal: Moves all extremities, no edema  Skin: warm and dry  Breast: Left breast is without dominant mass or nipple discharge.  The right breast the nipple areolar complex is good.  She has dense calcifications underneath the incision.  The breast is visibly smaller than the left.  No distinct masses felt.  Imaging:  Narrative & Impression   Interpreted By:  Jewel Roberts,   STUDY:  BI MAMMO BILATERAL SCREENING TOMOSYNTHESIS;  9/3/2024 11:55 am      ACCESSION NUMBER(S):  [id number]      ORDERING CLINICIAN:  JAYDEN MELGAR      INDICATION:  Screening.      ,Z12.31 Encounter for screening mammogram for malignant neoplasm of  breast      COMPARISON:  08/30/2023, 05/16/2024      FINDINGS:  2D and tomosynthesis images were reviewed at 1 mm slice thickness.   "    Density:  The breast tissue is heterogeneously dense, which may  obscure small masses. The right breast is very dense, secondary to  marked dystrophic calcifications in the central portion of the breast  similar to previous examinations. Surgical clips are present, in the  right axilla. The left breast has a more fibroglandular composition.  The appearance of dystrophic calcifications in the right breast is  similar to 08/30/2023 and 05/16/2024. The right nipple is absent. No  mass lesion is seen although the right breast parenchyma is obscured  marked calcification.      IMPRESSION:  No mammographic evidence of malignancy.      BI-RADS CATEGORY:  BI-RADS Category:  2 Benign.  Recommendation:  Annual Screening.  Recommended Date:  1 Year.  Laterality:  Bilateral.              For any future breast imaging appointments, please call [phone]).          MACRO:  None      Signed by: Jewel Roberts 9/3/2024 4:11 PM  Dictation workstation:        Assessment and Plan: Brit Lopez is a 78 y.o. old female with personal history of breast cancer and mammogram.  We reviewed the mammogram and the importance of breast self-exam.  She will return earlier if there are any issues otherwise we will see her back in 1 years time after the mammogram.  We discussed CHF a little bit and things to do to stay out of the hospital..     Rhina Chandra MD  9/10/2024

## 2024-09-11 NOTE — PROGRESS NOTES
"Subjective   Patient ID: Valorie Lopez is a 78 y.o. female who presents for Follow-up (ER 8/25/24).    HPI   Was hospitalized for congestive heart failure.  Review of echo done 6 months ago shows normal EF.  Nahco3 was prescribed by Dr. Cain at 2/day, it was stopped at hospital, patient resumed at 1 daily on return to homeand now stopped again, to see if helps woth dizziness(unsteadiness)  Home weight5 184-186, is down 10# from a month ago per pt.  Does not seem to have true vertigo.  And with 10 pound weight loss no evidence of edema or volume overload will reduce Lasix back to 40 mg daily as before and monitor over the weekend for weight blood pressure and dizziness.  Patient states leg edema has markedly improved    Patient is on CPAP with oxygen bleed in  Dasco indicates needs new sleep study with O2 titration for coverage.  Known CPAP with O2 bleedin for snore and wheeze  Review of Systems  Fatigue otherwise as per HPI  Objective   /68   Pulse 83   Ht 1.626 m (5' 4\")   Wt 86 kg (189 lb 9.6 oz)   SpO2 97%   BMI 32.54 kg/m²     Physical Exam  Heart rate regular.  Lungs clear.  No edema in extremities  Assessment/Plan   Problem List Items Addressed This Visit             ICD-10-CM    Mixed hyperlipidemia E78.2    Hypertension I10    Relevant Orders    Follow Up In Primary Care    KASSI on CPAP - Primary G47.33    Relevant Orders    In-Center Sleep Study (Non-Sleep Provider Only)    Follow Up In Primary Care    Chronic kidney disease (CKD), stage III (moderate) (Multi) N18.30    Acute myocardial infarction (Multi) I21.9    Relevant Medications    NIFEdipine XL 90 mg 24 hr tablet    carvedilol (Coreg) 25 mg tablet    PVD (peripheral vascular disease) (CMS-HCC) I73.9     Other Visit Diagnoses         Codes    BINGHAM (dyspnea on exertion)     R06.09    Encounter for Medicare annual wellness exam     Z00.00    Acquired hypothyroidism     E03.9    IGT (impaired glucose tolerance)     R73.02    HTN " (hypertension), benign     I10    Relevant Medications    NIFEdipine XL 90 mg 24 hr tablet    carvedilol (Coreg) 25 mg tablet    Other Relevant Orders    Follow Up In Primary Care    Pneumonia of both lower lobes due to infectious organism     J18.9    Relevant Medications    ipratropium-albuteroL (Duo-Neb) 0.5-2.5 mg/3 mL nebulizer solution

## 2024-09-13 ENCOUNTER — PATIENT OUTREACH (OUTPATIENT)
Age: 79
End: 2024-09-13
Payer: MEDICARE

## 2024-09-13 NOTE — PROGRESS NOTES
Call regarding appt. with PCP on 9/11/24 after hospitalization.  At time of outreach call the patient feels as if their condition has improved since last visit. Patient is currently waiting for sleep study. Reviewed the PCP appointment with the pt and addressed any questions or concerns.

## 2024-09-27 ENCOUNTER — LAB (OUTPATIENT)
Dept: LAB | Facility: LAB | Age: 79
End: 2024-09-27
Payer: MEDICARE

## 2024-09-27 DIAGNOSIS — N18.32 STAGE 3B CHRONIC KIDNEY DISEASE (MULTI): ICD-10-CM

## 2024-09-27 LAB
ANION GAP SERPL CALC-SCNC: 13 MMOL/L (ref 10–20)
APPEARANCE UR: ABNORMAL
BACTERIA #/AREA URNS AUTO: ABNORMAL /HPF
BILIRUB UR STRIP.AUTO-MCNC: NEGATIVE MG/DL
BUN SERPL-MCNC: 26 MG/DL (ref 6–23)
CALCIUM SERPL-MCNC: 9.7 MG/DL (ref 8.6–10.3)
CHLORIDE SERPL-SCNC: 108 MMOL/L (ref 98–107)
CO2 SERPL-SCNC: 22 MMOL/L (ref 21–32)
COLOR UR: ABNORMAL
CREAT SERPL-MCNC: 1.64 MG/DL (ref 0.5–1.05)
CREAT UR-MCNC: 77 MG/DL (ref 20–320)
EGFRCR SERPLBLD CKD-EPI 2021: 32 ML/MIN/1.73M*2
GLUCOSE SERPL-MCNC: 217 MG/DL (ref 74–99)
GLUCOSE UR STRIP.AUTO-MCNC: NORMAL MG/DL
KETONES UR STRIP.AUTO-MCNC: NEGATIVE MG/DL
LEUKOCYTE ESTERASE UR QL STRIP.AUTO: ABNORMAL
MICROALBUMIN UR-MCNC: 25 MG/L
MICROALBUMIN/CREAT UR: 32.5 UG/MG CREAT
MUCOUS THREADS #/AREA URNS AUTO: ABNORMAL /LPF
NITRITE UR QL STRIP.AUTO: ABNORMAL
PH UR STRIP.AUTO: 5.5 [PH]
POTASSIUM SERPL-SCNC: 4.3 MMOL/L (ref 3.5–5.3)
PROT UR STRIP.AUTO-MCNC: NEGATIVE MG/DL
RBC # UR STRIP.AUTO: NEGATIVE /UL
RBC #/AREA URNS AUTO: ABNORMAL /HPF
SODIUM SERPL-SCNC: 139 MMOL/L (ref 136–145)
SP GR UR STRIP.AUTO: 1.01
SQUAMOUS #/AREA URNS AUTO: ABNORMAL /HPF
UROBILINOGEN UR STRIP.AUTO-MCNC: NORMAL MG/DL
WBC #/AREA URNS AUTO: >50 /HPF
WBC CLUMPS #/AREA URNS AUTO: ABNORMAL /HPF

## 2024-09-27 PROCEDURE — 36415 COLL VENOUS BLD VENIPUNCTURE: CPT

## 2024-09-27 PROCEDURE — 82570 ASSAY OF URINE CREATININE: CPT

## 2024-09-27 PROCEDURE — 82043 UR ALBUMIN QUANTITATIVE: CPT

## 2024-09-27 PROCEDURE — 80048 BASIC METABOLIC PNL TOTAL CA: CPT

## 2024-09-27 PROCEDURE — 81001 URINALYSIS AUTO W/SCOPE: CPT

## 2024-09-30 ENCOUNTER — CLINICAL SUPPORT (OUTPATIENT)
Dept: SLEEP MEDICINE | Facility: CLINIC | Age: 79
End: 2024-09-30
Payer: MEDICARE

## 2024-09-30 ENCOUNTER — LAB (OUTPATIENT)
Dept: LAB | Facility: LAB | Age: 79
End: 2024-09-30
Payer: MEDICARE

## 2024-09-30 VITALS
SYSTOLIC BLOOD PRESSURE: 130 MMHG | HEIGHT: 64 IN | WEIGHT: 189.6 LBS | BODY MASS INDEX: 32.37 KG/M2 | HEART RATE: 72 BPM | TEMPERATURE: 98.1 F | OXYGEN SATURATION: 95 % | DIASTOLIC BLOOD PRESSURE: 51 MMHG

## 2024-09-30 DIAGNOSIS — G47.33 OSA ON CPAP: ICD-10-CM

## 2024-09-30 DIAGNOSIS — R82.90 ABNORMAL URINALYSIS: ICD-10-CM

## 2024-09-30 PROCEDURE — 87086 URINE CULTURE/COLONY COUNT: CPT

## 2024-09-30 PROCEDURE — 87186 SC STD MICRODIL/AGAR DIL: CPT

## 2024-09-30 ASSESSMENT — SLEEP AND FATIGUE QUESTIONNAIRES
ESS-CHAD TOTAL SCORE: 5
HOW LIKELY ARE YOU TO NOD OFF OR FALL ASLEEP WHILE WATCHING TV: SLIGHT CHANCE OF DOZING
HOW LIKELY ARE YOU TO NOD OFF OR FALL ASLEEP WHILE SITTING QUIETLY AFTER LUNCH WITHOUT ALCOHOL: WOULD NEVER DOZE
HOW LIKELY ARE YOU TO NOD OFF OR FALL ASLEEP WHILE SITTING AND READING: MODERATE CHANCE OF DOZING
HOW LIKELY ARE YOU TO NOD OFF OR FALL ASLEEP WHILE LYING DOWN TO REST IN THE AFTERNOON WHEN CIRCUMSTANCES PERMIT: MODERATE CHANCE OF DOZING
HOW LIKELY ARE YOU TO NOD OFF OR FALL ASLEEP IN A CAR, WHILE STOPPED FOR A FEW MINUTES IN TRAFFIC: WOULD NEVER DOZE
HOW LIKELY ARE YOU TO NOD OFF OR FALL ASLEEP WHILE SITTING AND TALKING TO SOMEONE: WOULD NEVER DOZE
HOW LIKELY ARE YOU TO NOD OFF OR FALL ASLEEP WHEN YOU ARE A PASSENGER IN A CAR FOR AN HOUR WITHOUT A BREAK: WOULD NEVER DOZE
SITING INACTIVE IN A PUBLIC PLACE LIKE A CLASS ROOM OR A MOVIE THEATER: WOULD NEVER DOZE

## 2024-10-01 NOTE — PROGRESS NOTES
[facility] TECH NOTE:     Patient: Brit Lopez   MRN//AGE: [id number]  1945  78 y.o.   Technologist: CLEM Blank   Room: 4   Service Date: 10/1/2024        Sleep Testing Location: [place] [address]     Buna: 5    TECHNOLOGIST SLEEP STUDY PROCEDURE NOTE:   This sleep study is being conducted according to the policies and procedures outlined by the AAS accreditation standards.  The sleep study procedure and processes involved during this appointment was explained to the patient/patient’s family () and all questions were answered. The patient/family verbalized understanding.      The patient is a 78 y.o. year old female scheduled for a Diagnostic PSG Split night with montage of:  PSG MASTER   PAP MASTER .     The study that was ultimately completed was a Diagnostic PSG Split night with montage of:  PSG MASTER   PAP MASTER .    The full study Was completed.  Patient questionnaires completed?: yes     Consents signed? yes    Initial Fall Risk Screening:     Brit has not fallen in the last 6 months. Brit does not have a fear of falling. She does not need assistance with sitting, standing, or walking. she does not need assistance walking in her home. she does not need assistance in an unfamiliar setting. The patient is not using an assistive device.     Brief Study observations: Room 4. Scheduled SPLIT study. The patient currently uses PAP therapy at home. During the diagnostic phase of the study, Mrs. Lopez experienced frequent obstructive respiratory events. Spo2 dipped into the 70s with events during REM but rebounded above 88% regularly. PAP was started at 01:15hrs. The patient brought her own mask from home. F&P Vitera size medium. Pressure was started at 5cmH2o and increased to 29ykY6i to combat obstructive apneas, hypopneas and flow limitations. All stages of sleep were observed. Sleep appeared to be more consolidated with pressures above 99kjO0d. Moderate snoring was noted before  PAP was started. Snoring appeared to be eliminated with the use of PAP therapy. Titration was on CPAP with room air.     Deviation to order/protocol and reason: Room air. The patient did not meet criteria for supplemental oxygen during this study. ETCo2 monitoring via nasal cannula during diagnostic phase of this study.      If PAP, which was preferred mask/pressure/mode: F&P Vitera Medium (patient's home mask)    After the procedure, the patient/family was informed to ensure followup with ordering clinician for testing results.      Technologist: MICHAELA BlankGT

## 2024-10-03 ENCOUNTER — APPOINTMENT (OUTPATIENT)
Dept: NEPHROLOGY | Facility: CLINIC | Age: 79
End: 2024-10-03
Payer: MEDICARE

## 2024-10-03 ENCOUNTER — OFFICE VISIT (OUTPATIENT)
Dept: NEPHROLOGY | Facility: CLINIC | Age: 79
End: 2024-10-03
Payer: MEDICARE

## 2024-10-03 VITALS
HEART RATE: 72 BPM | HEIGHT: 64 IN | BODY MASS INDEX: 32.98 KG/M2 | DIASTOLIC BLOOD PRESSURE: 66 MMHG | WEIGHT: 193.2 LBS | SYSTOLIC BLOOD PRESSURE: 114 MMHG

## 2024-10-03 DIAGNOSIS — N18.32 STAGE 3B CHRONIC KIDNEY DISEASE (MULTI): Primary | ICD-10-CM

## 2024-10-03 DIAGNOSIS — I10 PRIMARY HYPERTENSION: ICD-10-CM

## 2024-10-03 DIAGNOSIS — I50.9 CONGESTIVE HEART FAILURE, UNSPECIFIED HF CHRONICITY, UNSPECIFIED HEART FAILURE TYPE: ICD-10-CM

## 2024-10-03 DIAGNOSIS — I50.20 UNSPECIFIED SYSTOLIC (CONGESTIVE) HEART FAILURE: ICD-10-CM

## 2024-10-03 LAB — BACTERIA UR CULT: ABNORMAL

## 2024-10-03 PROCEDURE — 1159F MED LIST DOCD IN RCRD: CPT | Performed by: INTERNAL MEDICINE

## 2024-10-03 PROCEDURE — 99214 OFFICE O/P EST MOD 30 MIN: CPT | Performed by: INTERNAL MEDICINE

## 2024-10-03 PROCEDURE — 3078F DIAST BP <80 MM HG: CPT | Performed by: INTERNAL MEDICINE

## 2024-10-03 PROCEDURE — 3074F SYST BP LT 130 MM HG: CPT | Performed by: INTERNAL MEDICINE

## 2024-10-03 PROCEDURE — 1160F RVW MEDS BY RX/DR IN RCRD: CPT | Performed by: INTERNAL MEDICINE

## 2024-10-03 PROCEDURE — 1036F TOBACCO NON-USER: CPT | Performed by: INTERNAL MEDICINE

## 2024-10-03 RX ORDER — FUROSEMIDE 40 MG/1
40 TABLET ORAL DAILY
Qty: 90 TABLET | Refills: 3 | Status: SHIPPED | OUTPATIENT
Start: 2024-10-03 | End: 2025-10-03

## 2024-10-03 ASSESSMENT — ENCOUNTER SYMPTOMS
PSYCHIATRIC NEGATIVE: 1
CONSTITUTIONAL NEGATIVE: 1
ALLERGIC/IMMUNOLOGIC NEGATIVE: 1
CARDIOVASCULAR NEGATIVE: 1
GASTROINTESTINAL NEGATIVE: 1
MUSCULOSKELETAL NEGATIVE: 1
RESPIRATORY NEGATIVE: 1
HEMATOLOGIC/LYMPHATIC NEGATIVE: 1
NEUROLOGICAL NEGATIVE: 1
ENDOCRINE NEGATIVE: 1
EYES NEGATIVE: 1

## 2024-10-03 NOTE — PROGRESS NOTES
"Subjective   She has no urinary symptoms.  She cannot feel BM's  Has been trying to stay as clean as possible.    Patient ID: Brit Lopez \"Valorie\" is a 78 y.o. female who presents for Follow-up (1 month ck/Review labs).  HPI  She is here for follow-up secondary to chronic kidney disease with a baseline creatinine of 1.5-1.8 she has multiple medical comorbidities.  At last visit we stopped her Jardiance.  She was to continue bicarb and see how she does.  She had labs drawn on the 27th  Her glucose was 217 bicarb is 22 BUN 26 with a creatinine of 1.64 which is good for her and at her baseline with an estimated GFR of 32  Her urinalysis is positive for leukocyte esterase  Urine albumin creatinine ratio is better down to 32.5  She had greater than 50 white cells in her urine and her urine is growing greater than 100,000 gram-negative bacilli although the final cultures are not back as of yet  Medications are reviewed she is on Coreg cranberry Lasix nifedipine spironolactone and sodium bicarb  Blood pressure here in the office today is 144/66  Review of Systems   Constitutional: Negative.    HENT: Negative.     Eyes: Negative.    Respiratory: Negative.     Cardiovascular: Negative.    Gastrointestinal: Negative.    Endocrine: Negative.    Genitourinary: Negative.    Musculoskeletal: Negative.    Skin: Negative.    Allergic/Immunologic: Negative.    Neurological: Negative.    Hematological: Negative.    Psychiatric/Behavioral: Negative.         Objective   Physical Exam  Vitals reviewed.   Constitutional:       Appearance: Normal appearance.   HENT:      Head: Normocephalic and atraumatic.      Nose: Nose normal.      Mouth/Throat:      Mouth: Mucous membranes are moist.      Pharynx: Oropharynx is clear.   Eyes:      Extraocular Movements: Extraocular movements intact.      Pupils: Pupils are equal, round, and reactive to light.   Cardiovascular:      Rate and Rhythm: Normal rate and regular rhythm.   Pulmonary:     "  Effort: Pulmonary effort is normal.      Breath sounds: Normal breath sounds.   Abdominal:      General: Abdomen is flat.      Palpations: Abdomen is soft.   Musculoskeletal:         General: Normal range of motion.      Cervical back: Normal range of motion and neck supple.   Skin:     General: Skin is warm and dry.   Neurological:      General: No focal deficit present.      Mental Status: She is alert. Mental status is at baseline.   Psychiatric:         Mood and Affect: Mood normal.         Assessment/Plan   Problem List Items Addressed This Visit             ICD-10-CM    Hypertension I10    Chronic kidney disease (CKD), stage III (moderate) (Multi) - Primary N18.30    Relevant Medications    furosemide (Lasix) 40 mg tablet    Other Relevant Orders    Basic metabolic panel    Follow Up In Nephrology    Congestive heart failure, unspecified HF chronicity, unspecified heart failure type I50.9     Other Visit Diagnoses         Codes    Unspecified systolic (congestive) heart failure     I50.20        Plan:   Renal Function is very stable.  BP is stable as well  She has no symptoms from UTI standpoint.  So will not treat.    Continue lasix and BP meds as she is on.  Recheck labs in 3 months.       Chronic kidney disease Stage IIIb/IV: With baseline creatinine 1.5-1.8  Hypertension  Normocytic anemia   Obstructive sleep apnea with use of CPAP  Coronary artery disease with 3 stents placed in past  Hypothyroidism  Hyperlipidemia  Peripheral arterial disease  Obesity  Vitamin D Deficiency  R Renal Atrophy with GFR of 18% and Nephrectomy on 2/1/2019  Diastolic congestive heart failure: Acute on chronic  Moderate aortic stenosis  UTI with greater than 100,000 gram-negative bacilli:  Asymptomatic and likely Colonized.      Vincent Cain DO 10/03/24 10:47 AM

## 2024-10-07 ENCOUNTER — APPOINTMENT (OUTPATIENT)
Dept: GASTROENTEROLOGY | Facility: CLINIC | Age: 79
End: 2024-10-07
Payer: MEDICARE

## 2024-10-07 VITALS
BODY MASS INDEX: 32.95 KG/M2 | DIASTOLIC BLOOD PRESSURE: 75 MMHG | WEIGHT: 193 LBS | HEART RATE: 80 BPM | SYSTOLIC BLOOD PRESSURE: 168 MMHG | HEIGHT: 64 IN

## 2024-10-07 DIAGNOSIS — M62.89 PELVIC FLOOR DYSFUNCTION IN FEMALE: Primary | ICD-10-CM

## 2024-10-07 DIAGNOSIS — R19.5 STOOL COLOR ABNORMAL: ICD-10-CM

## 2024-10-07 DIAGNOSIS — R19.5 ABNORMAL STOOLS: ICD-10-CM

## 2024-10-07 PROCEDURE — 1159F MED LIST DOCD IN RCRD: CPT | Performed by: INTERNAL MEDICINE

## 2024-10-07 PROCEDURE — 1036F TOBACCO NON-USER: CPT | Performed by: INTERNAL MEDICINE

## 2024-10-07 PROCEDURE — 3077F SYST BP >= 140 MM HG: CPT | Performed by: INTERNAL MEDICINE

## 2024-10-07 PROCEDURE — 3078F DIAST BP <80 MM HG: CPT | Performed by: INTERNAL MEDICINE

## 2024-10-07 PROCEDURE — 99214 OFFICE O/P EST MOD 30 MIN: CPT | Performed by: INTERNAL MEDICINE

## 2024-10-07 RX ORDER — DICYCLOMINE HYDROCHLORIDE 10 MG/1
10 CAPSULE ORAL 2 TIMES DAILY PRN
Qty: 60 CAPSULE | Refills: 5 | Status: SHIPPED | OUTPATIENT
Start: 2024-10-07 | End: 2025-10-07

## 2024-10-07 ASSESSMENT — ENCOUNTER SYMPTOMS
DIARRHEA: 1
HEMATOLOGIC/LYMPHATIC NEGATIVE: 1
CARDIOVASCULAR NEGATIVE: 1
NEUROLOGICAL NEGATIVE: 1
ENDOCRINE NEGATIVE: 1
EYES NEGATIVE: 1
CONSTITUTIONAL NEGATIVE: 1
RESPIRATORY NEGATIVE: 1

## 2024-10-07 NOTE — PROGRESS NOTES
"Subjective   Patient ID: Brit Lopez \"Valorie\" is a 78 y.o. female who presents for Establish Care (New patient; complaints of abnormal stool/stool color; has been going on for years, has had frequent diarrhea in the past; has no control over bowels. /).    HPI patient referred by family doctor for change in bowel habits.  Underwent rectocele and bladder suspension repair approximately 6 years ago afterwards developed persistent change in bowel movements.  She states she has passage of stool in time she sits on the commode t urinate.  She will often have smearing and soilage of stool in her undergarments throughout the day if she is reverted to wearing undergarments and poise pads.  She has had full incontinence if she cannot make it to the bathroom in time and has full diarrhea.  She has tried fiber in the past but only was taking 1 to 2 teaspoons daily without improvement.  She is gone on elimination diet and found no foods that bother her other than if they are fried or greasy.    She denies any rectal bleeding.  Colonoscopy was completed in 2018 prior to her pelvic floor surgery.    OB history she has  2 para 2 last pregnancy had rapid onset of labor with forced induction because of infant distress and had grade 4 laceration to rectal muscle.    She has tried pelvic floor therapy with minimal improvement.    Review of Systems   Constitutional: Negative.    HENT: Negative.     Eyes: Negative.    Respiratory: Negative.     Cardiovascular: Negative.    Gastrointestinal:  Positive for diarrhea.   Endocrine: Negative.    Genitourinary: Negative.    Neurological: Negative.    Hematological: Negative.        Objective   Physical Exam  Vitals and nursing note reviewed.   Constitutional:       Appearance: Normal appearance.   HENT:      Head: Normocephalic.      Mouth/Throat:      Mouth: Mucous membranes are moist.      Pharynx: Oropharynx is clear.   Eyes:      Conjunctiva/sclera: Conjunctivae normal.      " Pupils: Pupils are equal, round, and reactive to light.   Cardiovascular:      Rate and Rhythm: Normal rate and regular rhythm.      Heart sounds: Normal heart sounds.   Pulmonary:      Effort: Pulmonary effort is normal.      Breath sounds: Normal breath sounds.   Abdominal:      General: Abdomen is flat. Bowel sounds are normal.      Palpations: Abdomen is soft.   Musculoskeletal:      Cervical back: Normal range of motion and neck supple.   Skin:     General: Skin is warm and dry.   Neurological:      General: No focal deficit present.      Mental Status: She is alert and oriented to person, place, and time.   Psychiatric:         Behavior: Behavior normal.         Assessment/Plan   Diagnoses and all orders for this visit:  Pelvic floor dysfunction in female  Abnormal stools  -     Referral to Gastroenterology  Stool color abnormal  -     Referral to Gastroenterology    I advised them that her bulk of her symptoms related to pelvic floor dysfunction we discussed Kegel exercises.  Recommend bulking agents will begin Metamucil goal of 2 to 3 tablespoons daily.  Discussed role for antispasmodics which we will begin as well to try and slow down her bowel movements trying to avoid constipation.       Tr Piña DO 10/07/24 1:43 PM

## 2024-10-08 ENCOUNTER — APPOINTMENT (OUTPATIENT)
Age: 79
End: 2024-10-08
Payer: MEDICARE

## 2024-10-08 VITALS
DIASTOLIC BLOOD PRESSURE: 60 MMHG | WEIGHT: 193 LBS | SYSTOLIC BLOOD PRESSURE: 130 MMHG | BODY MASS INDEX: 32.95 KG/M2 | OXYGEN SATURATION: 98 % | HEIGHT: 64 IN | HEART RATE: 73 BPM

## 2024-10-08 DIAGNOSIS — I10 PRIMARY HYPERTENSION: ICD-10-CM

## 2024-10-08 DIAGNOSIS — I10 HTN (HYPERTENSION), BENIGN: ICD-10-CM

## 2024-10-08 DIAGNOSIS — G47.33 OSA ON CPAP: ICD-10-CM

## 2024-10-08 PROCEDURE — 99213 OFFICE O/P EST LOW 20 MIN: CPT | Performed by: FAMILY MEDICINE

## 2024-10-08 PROCEDURE — 1160F RVW MEDS BY RX/DR IN RCRD: CPT | Performed by: FAMILY MEDICINE

## 2024-10-08 PROCEDURE — 3078F DIAST BP <80 MM HG: CPT | Performed by: FAMILY MEDICINE

## 2024-10-08 PROCEDURE — 3075F SYST BP GE 130 - 139MM HG: CPT | Performed by: FAMILY MEDICINE

## 2024-10-08 PROCEDURE — 1036F TOBACCO NON-USER: CPT | Performed by: FAMILY MEDICINE

## 2024-10-08 PROCEDURE — 1159F MED LIST DOCD IN RCRD: CPT | Performed by: FAMILY MEDICINE

## 2024-10-08 NOTE — PROGRESS NOTES
"Subjective   Patient ID: Valorie Lopez is a 78 y.o. female who presents for Follow-up (1 mo).    HPI   Ueses duoneb bid, still needs am and hs wheezing.  There is not shortness of breath no PND orthopnea  Nephro- not on sodium, status is good.  Home wt up to 191, up a little, is eating again  Diuretics keeping swelling down, skipped one day and had edema  Sleep study shows no need for supplemental O2  Review of Systems  Denies chest pains palpitations  Objective   /60   Pulse 73   Ht 1.626 m (5' 4\")   Wt 87.5 kg (193 lb)   SpO2 98%   BMI 33.13 kg/m²     Physical Exam  Heart regular.  Lungs clear.  Trace edema  Assessment/Plan   Problem List Items Addressed This Visit             ICD-10-CM    Hypertension I10    Relevant Orders    Follow Up In Primary Care    KASSI on CPAP G47.33    Relevant Orders    Follow Up In Primary Care     Other Visit Diagnoses         Codes    HTN (hypertension), benign     I10    Relevant Orders    Follow Up In Primary Care               "

## 2024-10-16 ENCOUNTER — PATIENT OUTREACH (OUTPATIENT)
Age: 79
End: 2024-10-16
Payer: MEDICARE

## 2024-10-31 ENCOUNTER — APPOINTMENT (OUTPATIENT)
Dept: NEPHROLOGY | Facility: CLINIC | Age: 79
End: 2024-10-31
Payer: MEDICARE

## 2024-11-04 ENCOUNTER — APPOINTMENT (OUTPATIENT)
Dept: NEPHROLOGY | Facility: CLINIC | Age: 79
End: 2024-11-04
Payer: MEDICARE

## 2024-11-13 ENCOUNTER — APPOINTMENT (OUTPATIENT)
Dept: RADIOLOGY | Facility: HOSPITAL | Age: 79
End: 2024-11-13
Payer: MEDICARE

## 2024-11-13 ENCOUNTER — HOSPITAL ENCOUNTER (EMERGENCY)
Facility: HOSPITAL | Age: 79
Discharge: HOME | End: 2024-11-13
Payer: MEDICARE

## 2024-11-13 VITALS
WEIGHT: 192.2 LBS | DIASTOLIC BLOOD PRESSURE: 66 MMHG | RESPIRATION RATE: 16 BRPM | OXYGEN SATURATION: 96 % | HEIGHT: 64 IN | BODY MASS INDEX: 32.81 KG/M2 | SYSTOLIC BLOOD PRESSURE: 139 MMHG | HEART RATE: 74 BPM

## 2024-11-13 DIAGNOSIS — S92.351A CLOSED DISPLACED FRACTURE OF FIFTH METATARSAL BONE OF RIGHT FOOT, INITIAL ENCOUNTER: Primary | ICD-10-CM
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
## 2024-11-13 PROCEDURE — 73564 X-RAY EXAM KNEE 4 OR MORE: CPT | Mod: LEFT SIDE | Performed by: RADIOLOGY

## 2024-11-13 PROCEDURE — 73630 X-RAY EXAM OF FOOT: CPT | Mod: RIGHT SIDE | Performed by: RADIOLOGY

## 2024-11-13 PROCEDURE — 99284 EMERGENCY DEPT VISIT MOD MDM: CPT

## 2024-11-13 PROCEDURE — 73610 X-RAY EXAM OF ANKLE: CPT | Mod: RIGHT SIDE | Performed by: RADIOLOGY

## 2024-11-13 PROCEDURE — 73564 X-RAY EXAM KNEE 4 OR MORE: CPT | Mod: LT

## 2024-11-13 PROCEDURE — 73610 X-RAY EXAM OF ANKLE: CPT | Mod: RT

## 2024-11-13 PROCEDURE — 73630 X-RAY EXAM OF FOOT: CPT | Mod: RT

## 2024-11-13 RX ORDER — HYDROCODONE BITARTRATE AND ACETAMINOPHEN 5; 325 MG/1; MG/1
1 TABLET ORAL EVERY 6 HOURS PRN
Qty: 12 TABLET | Refills: 0 | Status: SHIPPED | OUTPATIENT
Start: 2024-11-13 | End: 2024-11-16

## 2024-11-13 ASSESSMENT — PAIN SCALES - GENERAL: PAINLEVEL_OUTOF10: 3

## 2024-11-13 ASSESSMENT — COLUMBIA-SUICIDE SEVERITY RATING SCALE - C-SSRS
1. IN THE PAST MONTH, HAVE YOU WISHED YOU WERE DEAD OR WISHED YOU COULD GO TO SLEEP AND NOT WAKE UP?: NO
6. HAVE YOU EVER DONE ANYTHING, STARTED TO DO ANYTHING, OR PREPARED TO DO ANYTHING TO END YOUR LIFE?: NO
2. HAVE YOU ACTUALLY HAD ANY THOUGHTS OF KILLING YOURSELF?: NO

## 2024-11-13 ASSESSMENT — PAIN - FUNCTIONAL ASSESSMENT: PAIN_FUNCTIONAL_ASSESSMENT: 0-10

## 2024-11-13 NOTE — ED PROVIDER NOTES
Chief Complaint   Patient presents with    Fall     Tripped last night. C/o of left knee and right ankle/foot pain. -LOC        Patient History    Past Medical History:   Diagnosis Date    Old myocardial infarction 04/06/2018    History of myocardial infarction    Personal history of malignant neoplasm of breast     History of malignant neoplasm of breast    Personal history of other diseases of the circulatory system 04/06/2018    History of hypertension    Personal history of other diseases of the female genital tract 04/06/2018    History of uterine prolapse    Personal history of other endocrine, nutritional and metabolic disease 04/06/2018    History of hyperlipidemia    Personal history of other medical treatment     H/O mammogram      Past Surgical History:   Procedure Laterality Date    APPENDECTOMY  04/06/2018    Appendectomy    BI MAMMO BILATERAL SCREENING  08/30/2023    cat 2    BI MAMMO BILATERAL SCREENING Bilateral 09/03/2024    CAT 2    CATARACT EXTRACTION Bilateral 02/2024    COLONOSCOPY  05/16/2018    REPEAT 10YRS/DR MELGAR    INCISIONAL BREAST BIOPSY  04/06/2018    Incisional Breast Biopsy    OTHER SURGICAL HISTORY  04/06/2018    Breast Surgery Revision Of Reconstructed Right Breast    OTHER SURGICAL HISTORY  09/13/2021    Hip replacement    OTHER SURGICAL HISTORY  12/13/2019    Tubal ligation    OTHER SURGICAL HISTORY  12/13/2019    Kidney surgery    OTHER SURGICAL HISTORY  12/13/2019    Hysterectomy    OTHER SURGICAL HISTORY  12/13/2019    Cardiac catheterization with stent placement    OTHER SURGICAL HISTORY  12/13/2019    Cardiac catheterization    OTHER SURGICAL HISTORY  12/13/2019    Myringotomy with tube placement    OTHER SURGICAL HISTORY  06/11/2009    Right mastectomy with SLNB     ER/KS +  HER -2      Family History   Problem Relation Name Age of Onset    Hypertension Mother      Heart attack Mother      Heart disease Mother      Skin cancer Father      Heart attack Father   "    Kidney disease Father      Heart disease Father      Asthma Sister      Hypertension Daughter        Social History     Social History Narrative    Not on file      Allergies   Allergen Reactions    Amoxicillin-Pot Clavulanate Nausea/vomiting     vomiting    Citalopram Unknown    Hydralazine GI Upset and Unknown    Levofloxacin Cardiac arrhythmia/arrest    Nitrofurantoin Monohyd/M-Cryst Hives and Itching    Sulfa (Sulfonamide Antibiotics) Unknown    Thiazides Hives and Rash        PMH: Reviewed  PSH: Reviewed  Social History: Reviewed.   Allergies reviewed.     HPI: Brit Lopez \"Robert" is a 78 y.o. female who presents to the ED today accompanied by her  with complaints of left knee and right foot and ankle pain.  She states last evening she had fallen asleep in her reclining chair.  When she woke up around 8 PM, she attempted to get up quicker than usual to go to the bathroom, and lost her balance and fell.  Struck her left knee on the carpeted floor.  Twisted her right ankle.  She has now pain in both of these areas.  Took Tylenol last night and was able to sleep comfortably.  Pain continues into this morning. Denies prior injury.     PHYSICAL EXAM:    GENERAL: Vitals noted, no distress. Alert and oriented x 3. Non-toxic.       HEAD: Normocephalic, atraumatic. Pupils equally round and reactive to light. EOMI.     NECK: Supple. No midline or paraspinal tenderness through full range of motion.      CARDIAC: Regular rate, rhythm. No murmurs or rubs.    RESPIRATORY: Lungs clear and equal bilaterally. No respiratory distress.     MUSCULOSKELETAL & SKIN:  Warm, dry, and intact. No rash/lesions. No peripheral edema. Left knee with mild TTP anteriorly, no ecchymosis or edema. Right ankle/lateral foot with ecchymosis and TTP. B/l pedal pulses 2+. Sensation intact of b/l LE.     NEURO: No focal neurologic deficits, acting appropriately.     Labs Reviewed - No data to display     XR ankle right 3+ views "   Final Result   Minimally distracted fracture of the proximal 5th metatarsal.        MACRO:   None.        Signed by: Yeni Enrique 11/13/2024 11:20 AM   Dictation workstation:   [id number]      XR foot right 3+ views   Final Result   Minimally distracted fracture of the proximal 5th metatarsal.        MACRO:   None.        Signed by: Yeni Enrique 11/13/2024 11:20 AM   Dictation workstation:   [id number]      XR knee left 4+ views   Final Result   No evidence of fracture. Degenerative changes noted.        MACRO:   None.        Signed by: Yeni Enrique 11/13/2024 11:21 AM   Dictation workstation:   [id number]           Medical Decision Making         ED COURSE: This patient was seen and examined by myself independently. Sent for xray imaging of the left knee and the right foot/ankle.  These are noted above, x-ray of the left knee is unremarkable without evidence of fracture, with degenerative changes noted.  X-ray of the right foot and ankle, shows a minimally displaced fracture of the proximal fifth metatarsal.  Discussed with on-call podiatry, Dr. Castaneda, agreeable to nonweightbearing status with walking boot placed.  Patient has a walker at home.  She is advised to follow-up with Dr. Castaneda in the office within the week.  Referral placed.  Prescription is sent for short course of hydrocodone to be taken as needed for severe pain. She is discharged home in a stable condition with computer instructions given and is encouraged to return to the ER for any new or worsening symptoms.       DIAGNOSTIC IMPRESSION: #1 right fifth metatarsal fracture s/p fall     CELIA Don-DANIEL  11/13/24 1208       CELIA Don-DANIEL  11/13/24 1227

## 2024-11-14 ENCOUNTER — PATIENT OUTREACH (OUTPATIENT)
Age: 79
End: 2024-11-14
Payer: MEDICARE

## 2024-11-18 ENCOUNTER — LAB (OUTPATIENT)
Dept: LAB | Facility: LAB | Age: 79
End: 2024-11-18
Payer: MEDICARE

## 2024-11-18 DIAGNOSIS — E55.9 VITAMIN D DEFICIENCY, UNSPECIFIED: Primary | ICD-10-CM

## 2024-11-18 LAB — 25(OH)D3 SERPL-MCNC: >120 NG/ML (ref 30–100)

## 2024-11-18 PROCEDURE — 36415 COLL VENOUS BLD VENIPUNCTURE: CPT

## 2024-11-18 PROCEDURE — 82306 VITAMIN D 25 HYDROXY: CPT

## 2024-12-05 ENCOUNTER — APPOINTMENT (OUTPATIENT)
Dept: NEPHROLOGY | Facility: CLINIC | Age: 79
End: 2024-12-05
Payer: MEDICARE

## 2024-12-16 ENCOUNTER — HOSPITAL ENCOUNTER (OUTPATIENT)
Dept: RADIOLOGY | Facility: CLINIC | Age: 79
Discharge: HOME | End: 2024-12-16
Payer: MEDICARE

## 2024-12-16 DIAGNOSIS — S92.354A NONDISPLACED FRACTURE OF FIFTH METATARSAL BONE, RIGHT FOOT, INITIAL ENCOUNTER FOR CLOSED FRACTURE: ICD-10-CM

## 2024-12-16 PROCEDURE — 73630 X-RAY EXAM OF FOOT: CPT | Mod: RT

## 2024-12-16 PROCEDURE — 73630 X-RAY EXAM OF FOOT: CPT | Mod: RIGHT SIDE | Performed by: RADIOLOGY

## 2024-12-27 ENCOUNTER — APPOINTMENT (OUTPATIENT)
Dept: CARDIOLOGY | Facility: HOSPITAL | Age: 79
End: 2024-12-27
Payer: MEDICARE

## 2024-12-27 ENCOUNTER — APPOINTMENT (OUTPATIENT)
Dept: RADIOLOGY | Facility: HOSPITAL | Age: 79
End: 2024-12-27
Payer: MEDICARE

## 2024-12-27 ENCOUNTER — HOSPITAL ENCOUNTER (INPATIENT)
Facility: HOSPITAL | Age: 79
End: 2024-12-27
Attending: HOSPITALIST | Admitting: HOSPITALIST
Payer: MEDICARE

## 2024-12-27 DIAGNOSIS — N18.32 STAGE 3B CHRONIC KIDNEY DISEASE (MULTI): ICD-10-CM

## 2024-12-27 DIAGNOSIS — R07.82 INTERCOSTAL PAIN: Primary | ICD-10-CM

## 2024-12-27 LAB
ALBUMIN SERPL BCP-MCNC: 4.4 G/DL (ref 3.4–5)
ALP SERPL-CCNC: 59 U/L (ref 33–136)
ALT SERPL W P-5'-P-CCNC: 15 U/L (ref 7–45)
ANION GAP SERPL CALC-SCNC: 14 MMOL/L (ref 10–20)
AST SERPL W P-5'-P-CCNC: 21 U/L (ref 9–39)
BILIRUB SERPL-MCNC: 1.3 MG/DL (ref 0–1.2)
BNP SERPL-MCNC: 138 PG/ML (ref 0–99)
BUN SERPL-MCNC: 24 MG/DL (ref 6–23)
CALCIUM SERPL-MCNC: 10 MG/DL (ref 8.6–10.3)
CARDIAC TROPONIN I PNL SERPL HS: 28 NG/L (ref 0–13)
CARDIAC TROPONIN I PNL SERPL HS: 29 NG/L (ref 0–13)
CHLORIDE SERPL-SCNC: 105 MMOL/L (ref 98–107)
CO2 SERPL-SCNC: 22 MMOL/L (ref 21–32)
CREAT SERPL-MCNC: 1.59 MG/DL (ref 0.5–1.05)
D DIMER PPP FEU-MCNC: 1060 NG/ML FEU
EGFRCR SERPLBLD CKD-EPI 2021: 33 ML/MIN/1.73M*2
ERYTHROCYTE [DISTWIDTH] IN BLOOD BY AUTOMATED COUNT: 13.7 % (ref 11.5–14.5)
FLUAV RNA RESP QL NAA+PROBE: NOT DETECTED
FLUBV RNA RESP QL NAA+PROBE: NOT DETECTED
GLUCOSE SERPL-MCNC: 148 MG/DL (ref 74–99)
HCT VFR BLD AUTO: 41.5 % (ref 36–46)
HGB BLD-MCNC: 14 G/DL (ref 12–16)
MCH RBC QN AUTO: 29.7 PG (ref 26–34)
MCHC RBC AUTO-ENTMCNC: 33.7 G/DL (ref 32–36)
MCV RBC AUTO: 88 FL (ref 80–100)
NRBC BLD-RTO: 0 /100 WBCS (ref 0–0)
PLATELET # BLD AUTO: 240 X10*3/UL (ref 150–450)
POTASSIUM SERPL-SCNC: 3.9 MMOL/L (ref 3.5–5.3)
PROT SERPL-MCNC: 7.3 G/DL (ref 6.4–8.2)
RBC # BLD AUTO: 4.71 X10*6/UL (ref 4–5.2)
SARS-COV-2 RNA RESP QL NAA+PROBE: NOT DETECTED
SODIUM SERPL-SCNC: 137 MMOL/L (ref 136–145)
WBC # BLD AUTO: 11.8 X10*3/UL (ref 4.4–11.3)

## 2024-12-27 PROCEDURE — 96374 THER/PROPH/DIAG INJ IV PUSH: CPT

## 2024-12-27 PROCEDURE — 93005 ELECTROCARDIOGRAM TRACING: CPT

## 2024-12-27 PROCEDURE — 71101 X-RAY EXAM UNILAT RIBS/CHEST: CPT | Mod: LEFT SIDE | Performed by: RADIOLOGY

## 2024-12-27 PROCEDURE — 36415 COLL VENOUS BLD VENIPUNCTURE: CPT | Performed by: PHYSICIAN ASSISTANT

## 2024-12-27 PROCEDURE — 87636 SARSCOV2 & INF A&B AMP PRB: CPT | Performed by: PHYSICIAN ASSISTANT

## 2024-12-27 PROCEDURE — 2550000001 HC RX 255 CONTRASTS: Performed by: PHYSICIAN ASSISTANT

## 2024-12-27 PROCEDURE — 2500000004 HC RX 250 GENERAL PHARMACY W/ HCPCS (ALT 636 FOR OP/ED)

## 2024-12-27 PROCEDURE — 80053 COMPREHEN METABOLIC PANEL: CPT | Performed by: PHYSICIAN ASSISTANT

## 2024-12-27 PROCEDURE — 85027 COMPLETE CBC AUTOMATED: CPT | Performed by: PHYSICIAN ASSISTANT

## 2024-12-27 PROCEDURE — 2500000004 HC RX 250 GENERAL PHARMACY W/ HCPCS (ALT 636 FOR OP/ED): Performed by: PHYSICIAN ASSISTANT

## 2024-12-27 PROCEDURE — 83880 ASSAY OF NATRIURETIC PEPTIDE: CPT | Performed by: PHYSICIAN ASSISTANT

## 2024-12-27 PROCEDURE — 71275 CT ANGIOGRAPHY CHEST: CPT

## 2024-12-27 PROCEDURE — 71101 X-RAY EXAM UNILAT RIBS/CHEST: CPT | Mod: LT

## 2024-12-27 PROCEDURE — 71275 CT ANGIOGRAPHY CHEST: CPT | Mod: FOREIGN READ | Performed by: RADIOLOGY

## 2024-12-27 PROCEDURE — 96375 TX/PRO/DX INJ NEW DRUG ADDON: CPT

## 2024-12-27 PROCEDURE — 99285 EMERGENCY DEPT VISIT HI MDM: CPT | Mod: 25

## 2024-12-27 PROCEDURE — 85379 FIBRIN DEGRADATION QUANT: CPT | Performed by: PHYSICIAN ASSISTANT

## 2024-12-27 PROCEDURE — 84484 ASSAY OF TROPONIN QUANT: CPT | Performed by: PHYSICIAN ASSISTANT

## 2024-12-27 RX ORDER — ONDANSETRON HYDROCHLORIDE 2 MG/ML
4 INJECTION, SOLUTION INTRAVENOUS ONCE
Status: COMPLETED | OUTPATIENT
Start: 2024-12-27 | End: 2024-12-27

## 2024-12-27 RX ORDER — ONDANSETRON HYDROCHLORIDE 2 MG/ML
INJECTION, SOLUTION INTRAVENOUS
Status: COMPLETED
Start: 2024-12-27 | End: 2024-12-27

## 2024-12-27 RX ADMIN — IOHEXOL 69 ML: 350 INJECTION, SOLUTION INTRAVENOUS at 21:29

## 2024-12-27 RX ADMIN — ONDANSETRON 4 MG: 2 INJECTION INTRAMUSCULAR; INTRAVENOUS at 20:49

## 2024-12-27 RX ADMIN — ONDANSETRON HYDROCHLORIDE 4 MG: 2 INJECTION, SOLUTION INTRAVENOUS at 20:49

## 2024-12-27 RX ADMIN — HYDROMORPHONE HYDROCHLORIDE 0.5 MG: 1 INJECTION, SOLUTION INTRAMUSCULAR; INTRAVENOUS; SUBCUTANEOUS at 20:49

## 2024-12-27 RX ADMIN — SODIUM CHLORIDE 500 ML: 9 INJECTION, SOLUTION INTRAVENOUS at 20:01

## 2024-12-27 ASSESSMENT — PAIN - FUNCTIONAL ASSESSMENT: PAIN_FUNCTIONAL_ASSESSMENT: 0-10

## 2024-12-27 ASSESSMENT — COLUMBIA-SUICIDE SEVERITY RATING SCALE - C-SSRS
2. HAVE YOU ACTUALLY HAD ANY THOUGHTS OF KILLING YOURSELF?: NO
6. HAVE YOU EVER DONE ANYTHING, STARTED TO DO ANYTHING, OR PREPARED TO DO ANYTHING TO END YOUR LIFE?: NO
1. IN THE PAST MONTH, HAVE YOU WISHED YOU WERE DEAD OR WISHED YOU COULD GO TO SLEEP AND NOT WAKE UP?: NO

## 2024-12-27 ASSESSMENT — PAIN SCALES - GENERAL
PAINLEVEL_OUTOF10: 7
PAINLEVEL_OUTOF10: 10 - WORST POSSIBLE PAIN
PAINLEVEL_OUTOF10: 0 - NO PAIN

## 2024-12-27 ASSESSMENT — PAIN DESCRIPTION - LOCATION: LOCATION: SHOULDER

## 2024-12-28 ENCOUNTER — APPOINTMENT (OUTPATIENT)
Dept: RADIOLOGY | Facility: HOSPITAL | Age: 79
End: 2024-12-28
Payer: MEDICARE

## 2024-12-28 PROBLEM — R07.9 CHEST PAIN: Status: ACTIVE | Noted: 2024-12-28

## 2024-12-28 LAB
ALBUMIN SERPL BCP-MCNC: 3.8 G/DL (ref 3.4–5)
ALP SERPL-CCNC: 48 U/L (ref 33–136)
ALT SERPL W P-5'-P-CCNC: 12 U/L (ref 7–45)
ANION GAP SERPL CALC-SCNC: 10 MMOL/L (ref 10–20)
APPEARANCE UR: ABNORMAL
AST SERPL W P-5'-P-CCNC: 17 U/L (ref 9–39)
BILIRUB SERPL-MCNC: 1 MG/DL (ref 0–1.2)
BILIRUB UR STRIP.AUTO-MCNC: NEGATIVE MG/DL
BUN SERPL-MCNC: 22 MG/DL (ref 6–23)
CALCIUM SERPL-MCNC: 9.5 MG/DL (ref 8.6–10.3)
CARDIAC TROPONIN I PNL SERPL HS: 27 NG/L (ref 0–13)
CHLORIDE SERPL-SCNC: 106 MMOL/L (ref 98–107)
CO2 SERPL-SCNC: 24 MMOL/L (ref 21–32)
COLOR UR: ABNORMAL
CREAT SERPL-MCNC: 1.41 MG/DL (ref 0.5–1.05)
EGFRCR SERPLBLD CKD-EPI 2021: 38 ML/MIN/1.73M*2
ERYTHROCYTE [DISTWIDTH] IN BLOOD BY AUTOMATED COUNT: 13.8 % (ref 11.5–14.5)
GLUCOSE SERPL-MCNC: 128 MG/DL (ref 74–99)
GLUCOSE UR STRIP.AUTO-MCNC: NORMAL MG/DL
HCT VFR BLD AUTO: 38.8 % (ref 36–46)
HGB BLD-MCNC: 12.9 G/DL (ref 12–16)
HOLD SPECIMEN: NORMAL
HOLD SPECIMEN: NORMAL
KETONES UR STRIP.AUTO-MCNC: NEGATIVE MG/DL
LACTATE SERPL-SCNC: 0.9 MMOL/L (ref 0.4–2)
LEUKOCYTE ESTERASE UR QL STRIP.AUTO: ABNORMAL
MCH RBC QN AUTO: 30.4 PG (ref 26–34)
MCHC RBC AUTO-ENTMCNC: 33.2 G/DL (ref 32–36)
MCV RBC AUTO: 92 FL (ref 80–100)
MUCOUS THREADS #/AREA URNS AUTO: ABNORMAL /LPF
NITRITE UR QL STRIP.AUTO: ABNORMAL
NRBC BLD-RTO: 0 /100 WBCS (ref 0–0)
PH UR STRIP.AUTO: 6 [PH]
PLATELET # BLD AUTO: 223 X10*3/UL (ref 150–450)
POTASSIUM SERPL-SCNC: 4 MMOL/L (ref 3.5–5.3)
PROT SERPL-MCNC: 6.1 G/DL (ref 6.4–8.2)
PROT UR STRIP.AUTO-MCNC: NEGATIVE MG/DL
RBC # BLD AUTO: 4.24 X10*6/UL (ref 4–5.2)
RBC # UR STRIP.AUTO: NEGATIVE /UL
RBC #/AREA URNS AUTO: ABNORMAL /HPF
SODIUM SERPL-SCNC: 136 MMOL/L (ref 136–145)
SP GR UR STRIP.AUTO: 1.01
SQUAMOUS #/AREA URNS AUTO: ABNORMAL /HPF
UROBILINOGEN UR STRIP.AUTO-MCNC: NORMAL MG/DL
WBC # BLD AUTO: 9 X10*3/UL (ref 4.4–11.3)
WBC #/AREA URNS AUTO: ABNORMAL /HPF

## 2024-12-28 PROCEDURE — 99223 1ST HOSP IP/OBS HIGH 75: CPT | Performed by: HOSPITALIST

## 2024-12-28 PROCEDURE — 2500000001 HC RX 250 WO HCPCS SELF ADMINISTERED DRUGS (ALT 637 FOR MEDICARE OP)

## 2024-12-28 PROCEDURE — 85027 COMPLETE CBC AUTOMATED: CPT | Performed by: HOSPITALIST

## 2024-12-28 PROCEDURE — 2500000004 HC RX 250 GENERAL PHARMACY W/ HCPCS (ALT 636 FOR OP/ED): Performed by: HOSPITALIST

## 2024-12-28 PROCEDURE — 74176 CT ABD & PELVIS W/O CONTRAST: CPT | Mod: FOREIGN READ | Performed by: RADIOLOGY

## 2024-12-28 PROCEDURE — 96376 TX/PRO/DX INJ SAME DRUG ADON: CPT

## 2024-12-28 PROCEDURE — 80053 COMPREHEN METABOLIC PANEL: CPT | Performed by: HOSPITALIST

## 2024-12-28 PROCEDURE — 74176 CT ABD & PELVIS W/O CONTRAST: CPT

## 2024-12-28 PROCEDURE — 81001 URINALYSIS AUTO W/SCOPE: CPT | Performed by: PHYSICIAN ASSISTANT

## 2024-12-28 PROCEDURE — G0378 HOSPITAL OBSERVATION PER HR: HCPCS

## 2024-12-28 PROCEDURE — 2500000001 HC RX 250 WO HCPCS SELF ADMINISTERED DRUGS (ALT 637 FOR MEDICARE OP): Performed by: HOSPITALIST

## 2024-12-28 PROCEDURE — 97161 PT EVAL LOW COMPLEX 20 MIN: CPT | Mod: GP | Performed by: PHYSICAL THERAPIST

## 2024-12-28 PROCEDURE — 84484 ASSAY OF TROPONIN QUANT: CPT | Performed by: EMERGENCY MEDICINE

## 2024-12-28 PROCEDURE — 2500000004 HC RX 250 GENERAL PHARMACY W/ HCPCS (ALT 636 FOR OP/ED)

## 2024-12-28 PROCEDURE — 87077 CULTURE AEROBIC IDENTIFY: CPT | Mod: SAMLAB | Performed by: PHYSICIAN ASSISTANT

## 2024-12-28 PROCEDURE — 36415 COLL VENOUS BLD VENIPUNCTURE: CPT | Performed by: EMERGENCY MEDICINE

## 2024-12-28 PROCEDURE — 2500000005 HC RX 250 GENERAL PHARMACY W/O HCPCS: Performed by: HOSPITALIST

## 2024-12-28 PROCEDURE — 83605 ASSAY OF LACTIC ACID: CPT | Performed by: HOSPITALIST

## 2024-12-28 PROCEDURE — 36415 COLL VENOUS BLD VENIPUNCTURE: CPT | Performed by: HOSPITALIST

## 2024-12-28 PROCEDURE — 2500000002 HC RX 250 W HCPCS SELF ADMINISTERED DRUGS (ALT 637 FOR MEDICARE OP, ALT 636 FOR OP/ED): Performed by: HOSPITALIST

## 2024-12-28 RX ORDER — ACETAMINOPHEN 160 MG/5ML
650 SOLUTION ORAL EVERY 4 HOURS PRN
Status: DISCONTINUED | OUTPATIENT
Start: 2024-12-28 | End: 2024-12-28

## 2024-12-28 RX ORDER — ROSUVASTATIN CALCIUM 20 MG/1
20 TABLET, COATED ORAL DAILY
Status: DISCONTINUED | OUTPATIENT
Start: 2024-12-28 | End: 2024-12-28

## 2024-12-28 RX ORDER — CILOSTAZOL 50 MG/1
50 TABLET ORAL 2 TIMES DAILY
Status: DISCONTINUED | OUTPATIENT
Start: 2024-12-28 | End: 2024-12-30 | Stop reason: HOSPADM

## 2024-12-28 RX ORDER — ACETAMINOPHEN 325 MG/1
650 TABLET ORAL EVERY 4 HOURS PRN
Status: DISCONTINUED | OUTPATIENT
Start: 2024-12-28 | End: 2024-12-30 | Stop reason: HOSPADM

## 2024-12-28 RX ORDER — CARVEDILOL 25 MG/1
25 TABLET ORAL 2 TIMES DAILY
Status: DISCONTINUED | OUTPATIENT
Start: 2024-12-28 | End: 2024-12-30 | Stop reason: HOSPADM

## 2024-12-28 RX ORDER — KETOROLAC TROMETHAMINE 30 MG/ML
15 INJECTION, SOLUTION INTRAMUSCULAR; INTRAVENOUS EVERY 6 HOURS
Status: DISCONTINUED | OUTPATIENT
Start: 2024-12-28 | End: 2024-12-29

## 2024-12-28 RX ORDER — ENOXAPARIN SODIUM 100 MG/ML
40 INJECTION SUBCUTANEOUS EVERY 24 HOURS
Status: DISCONTINUED | OUTPATIENT
Start: 2024-12-28 | End: 2024-12-29

## 2024-12-28 RX ORDER — ATORVASTATIN CALCIUM 80 MG/1
80 TABLET, FILM COATED ORAL NIGHTLY
Status: DISCONTINUED | OUTPATIENT
Start: 2024-12-28 | End: 2024-12-30 | Stop reason: HOSPADM

## 2024-12-28 RX ORDER — ACETAMINOPHEN 650 MG/1
650 SUPPOSITORY RECTAL EVERY 4 HOURS PRN
Status: DISCONTINUED | OUTPATIENT
Start: 2024-12-28 | End: 2024-12-28

## 2024-12-28 RX ORDER — ONDANSETRON 4 MG/1
4 TABLET, ORALLY DISINTEGRATING ORAL EVERY 8 HOURS PRN
Status: DISCONTINUED | OUTPATIENT
Start: 2024-12-28 | End: 2024-12-30 | Stop reason: HOSPADM

## 2024-12-28 RX ORDER — OXYCODONE AND ACETAMINOPHEN 5; 325 MG/1; MG/1
1 TABLET ORAL EVERY 4 HOURS PRN
Status: DISCONTINUED | OUTPATIENT
Start: 2024-12-28 | End: 2024-12-30 | Stop reason: HOSPADM

## 2024-12-28 RX ORDER — FUROSEMIDE 40 MG/1
40 TABLET ORAL DAILY
Status: DISCONTINUED | OUTPATIENT
Start: 2024-12-28 | End: 2024-12-30 | Stop reason: HOSPADM

## 2024-12-28 RX ORDER — ONDANSETRON HYDROCHLORIDE 2 MG/ML
4 INJECTION, SOLUTION INTRAVENOUS EVERY 8 HOURS PRN
Status: DISCONTINUED | OUTPATIENT
Start: 2024-12-28 | End: 2024-12-30 | Stop reason: HOSPADM

## 2024-12-28 RX ORDER — ASPIRIN 81 MG/1
81 TABLET ORAL DAILY
Status: DISCONTINUED | OUTPATIENT
Start: 2024-12-28 | End: 2024-12-30 | Stop reason: HOSPADM

## 2024-12-28 RX ORDER — LEVOTHYROXINE SODIUM 100 UG/1
100 TABLET ORAL DAILY
Status: DISCONTINUED | OUTPATIENT
Start: 2024-12-28 | End: 2024-12-30 | Stop reason: HOSPADM

## 2024-12-28 RX ORDER — SPIRONOLACTONE 25 MG/1
25 TABLET ORAL DAILY
Status: DISCONTINUED | OUTPATIENT
Start: 2024-12-28 | End: 2024-12-30 | Stop reason: HOSPADM

## 2024-12-28 RX ORDER — FAMOTIDINE 20 MG/1
20 TABLET, FILM COATED ORAL DAILY
Status: DISCONTINUED | OUTPATIENT
Start: 2024-12-28 | End: 2024-12-30 | Stop reason: HOSPADM

## 2024-12-28 RX ORDER — POLYETHYLENE GLYCOL 3350 17 G/17G
17 POWDER, FOR SOLUTION ORAL DAILY
Status: DISCONTINUED | OUTPATIENT
Start: 2024-12-28 | End: 2024-12-30 | Stop reason: HOSPADM

## 2024-12-28 RX ADMIN — CILOSTAZOL 50 MG: 50 TABLET ORAL at 10:20

## 2024-12-28 RX ADMIN — NIFEDIPINE 90 MG: 60 TABLET, EXTENDED RELEASE ORAL at 10:16

## 2024-12-28 RX ADMIN — KETOROLAC TROMETHAMINE 15 MG: 30 INJECTION, SOLUTION INTRAMUSCULAR at 14:32

## 2024-12-28 RX ADMIN — KETOROLAC TROMETHAMINE 15 MG: 30 INJECTION, SOLUTION INTRAMUSCULAR at 20:41

## 2024-12-28 RX ADMIN — CARVEDILOL 25 MG: 25 TABLET, FILM COATED ORAL at 10:14

## 2024-12-28 RX ADMIN — HYDROMORPHONE HYDROCHLORIDE 0.4 MG: 1 INJECTION, SOLUTION INTRAMUSCULAR; INTRAVENOUS; SUBCUTANEOUS at 03:07

## 2024-12-28 RX ADMIN — POLYETHYLENE GLYCOL 3350 17 G: 17 POWDER, FOR SOLUTION ORAL at 10:18

## 2024-12-28 RX ADMIN — LEVOTHYROXINE SODIUM 100 MCG: 0.1 TABLET ORAL at 06:09

## 2024-12-28 RX ADMIN — SPIRONOLACTONE 25 MG: 25 TABLET ORAL at 20:41

## 2024-12-28 RX ADMIN — ASPIRIN 81 MG: 81 TABLET, COATED ORAL at 10:14

## 2024-12-28 RX ADMIN — ATORVASTATIN CALCIUM 80 MG: 80 TABLET, FILM COATED ORAL at 20:41

## 2024-12-28 RX ADMIN — Medication 2 L/MIN: at 18:21

## 2024-12-28 RX ADMIN — FUROSEMIDE 40 MG: 40 TABLET ORAL at 10:17

## 2024-12-28 RX ADMIN — CILOSTAZOL 50 MG: 50 TABLET ORAL at 20:41

## 2024-12-28 RX ADMIN — CARVEDILOL 25 MG: 25 TABLET, FILM COATED ORAL at 20:41

## 2024-12-28 SDOH — SOCIAL STABILITY: SOCIAL INSECURITY: HAVE YOU HAD ANY THOUGHTS OF HARMING ANYONE ELSE?: NO

## 2024-12-28 SDOH — ECONOMIC STABILITY: FOOD INSECURITY: WITHIN THE PAST 12 MONTHS, YOU WORRIED THAT YOUR FOOD WOULD RUN OUT BEFORE YOU GOT THE MONEY TO BUY MORE.: NEVER TRUE

## 2024-12-28 SDOH — SOCIAL STABILITY: SOCIAL INSECURITY: HAVE YOU HAD THOUGHTS OF HARMING ANYONE ELSE?: NO

## 2024-12-28 SDOH — SOCIAL STABILITY: SOCIAL INSECURITY: ARE YOU OR HAVE YOU BEEN THREATENED OR ABUSED PHYSICALLY, EMOTIONALLY, OR SEXUALLY BY ANYONE?: NO

## 2024-12-28 SDOH — SOCIAL STABILITY: SOCIAL INSECURITY: DO YOU FEEL ANYONE HAS EXPLOITED OR TAKEN ADVANTAGE OF YOU FINANCIALLY OR OF YOUR PERSONAL PROPERTY?: NO

## 2024-12-28 SDOH — SOCIAL STABILITY: SOCIAL INSECURITY: DOES ANYONE TRY TO KEEP YOU FROM HAVING/CONTACTING OTHER FRIENDS OR DOING THINGS OUTSIDE YOUR HOME?: NO

## 2024-12-28 SDOH — SOCIAL STABILITY: SOCIAL INSECURITY
WITHIN THE LAST YEAR, HAVE YOU BEEN KICKED, HIT, SLAPPED, OR OTHERWISE PHYSICALLY HURT BY YOUR PARTNER OR EX-PARTNER?: NO

## 2024-12-28 SDOH — ECONOMIC STABILITY: FOOD INSECURITY: WITHIN THE PAST 12 MONTHS, THE FOOD YOU BOUGHT JUST DIDN'T LAST AND YOU DIDN'T HAVE MONEY TO GET MORE.: NEVER TRUE

## 2024-12-28 SDOH — ECONOMIC STABILITY: INCOME INSECURITY: IN THE PAST 12 MONTHS HAS THE ELECTRIC, GAS, OIL, OR WATER COMPANY THREATENED TO SHUT OFF SERVICES IN YOUR HOME?: NO

## 2024-12-28 SDOH — SOCIAL STABILITY: SOCIAL INSECURITY: DO YOU FEEL UNSAFE GOING BACK TO THE PLACE WHERE YOU ARE LIVING?: NO

## 2024-12-28 SDOH — SOCIAL STABILITY: SOCIAL INSECURITY: ARE THERE ANY APPARENT SIGNS OF INJURIES/BEHAVIORS THAT COULD BE RELATED TO ABUSE/NEGLECT?: NO

## 2024-12-28 SDOH — SOCIAL STABILITY: SOCIAL INSECURITY
WITHIN THE LAST YEAR, HAVE YOU BEEN RAPED OR FORCED TO HAVE ANY KIND OF SEXUAL ACTIVITY BY YOUR PARTNER OR EX-PARTNER?: NO

## 2024-12-28 SDOH — SOCIAL STABILITY: SOCIAL INSECURITY: ABUSE: ADULT

## 2024-12-28 SDOH — SOCIAL STABILITY: SOCIAL INSECURITY: WITHIN THE LAST YEAR, HAVE YOU BEEN HUMILIATED OR EMOTIONALLY ABUSED IN OTHER WAYS BY YOUR PARTNER OR EX-PARTNER?: NO

## 2024-12-28 SDOH — SOCIAL STABILITY: SOCIAL INSECURITY: HAS ANYONE EVER THREATENED TO HURT YOUR FAMILY OR YOUR PETS?: NO

## 2024-12-28 SDOH — SOCIAL STABILITY: SOCIAL INSECURITY: WITHIN THE LAST YEAR, HAVE YOU BEEN AFRAID OF YOUR PARTNER OR EX-PARTNER?: NO

## 2024-12-28 ASSESSMENT — ACTIVITIES OF DAILY LIVING (ADL)
HEARING - LEFT EAR: FUNCTIONAL
ADL_ASSISTANCE: INDEPENDENT
ADEQUATE_TO_COMPLETE_ADL: YES
BATHING: NEEDS ASSISTANCE
JUDGMENT_ADEQUATE_SAFELY_COMPLETE_DAILY_ACTIVITIES: YES
WALKS IN HOME: NEEDS ASSISTANCE
ASSISTIVE_DEVICE: WALKER
GROOMING: INDEPENDENT
PATIENT'S MEMORY ADEQUATE TO SAFELY COMPLETE DAILY ACTIVITIES?: YES
FEEDING YOURSELF: INDEPENDENT
LACK_OF_TRANSPORTATION: NO
HEARING - RIGHT EAR: FUNCTIONAL
TOILETING: NEEDS ASSISTANCE
DRESSING YOURSELF: INDEPENDENT

## 2024-12-28 ASSESSMENT — PAIN - FUNCTIONAL ASSESSMENT
PAIN_FUNCTIONAL_ASSESSMENT: 0-10

## 2024-12-28 ASSESSMENT — LIFESTYLE VARIABLES
HOW MANY STANDARD DRINKS CONTAINING ALCOHOL DO YOU HAVE ON A TYPICAL DAY: 1 OR 2
HOW OFTEN DO YOU HAVE A DRINK CONTAINING ALCOHOL: MONTHLY OR LESS
HOW OFTEN DO YOU HAVE 6 OR MORE DRINKS ON ONE OCCASION: NEVER
AUDIT-C TOTAL SCORE: 1
SKIP TO QUESTIONS 9-10: 1
AUDIT-C TOTAL SCORE: 1

## 2024-12-28 ASSESSMENT — PAIN SCALES - GENERAL
PAINLEVEL_OUTOF10: 3
PAINLEVEL_OUTOF10: 0 - NO PAIN
PAINLEVEL_OUTOF10: 2
PAINLEVEL_OUTOF10: 8
PAINLEVEL_OUTOF10: 0 - NO PAIN
PAINLEVEL_OUTOF10: 5 - MODERATE PAIN
PAINLEVEL_OUTOF10: 5 - MODERATE PAIN
PAINLEVEL_OUTOF10: 4

## 2024-12-28 ASSESSMENT — COGNITIVE AND FUNCTIONAL STATUS - GENERAL
TOILETING: A LITTLE
DRESSING REGULAR LOWER BODY CLOTHING: A LITTLE
CLIMB 3 TO 5 STEPS WITH RAILING: A LOT
TURNING FROM BACK TO SIDE WHILE IN FLAT BAD: A LITTLE
MOVING TO AND FROM BED TO CHAIR: A LITTLE
HELP NEEDED FOR BATHING: A LITTLE
MOBILITY SCORE: 21
MOBILITY SCORE: 16
CLIMB 3 TO 5 STEPS WITH RAILING: TOTAL
DAILY ACTIVITIY SCORE: 21
PATIENT BASELINE BEDBOUND: NO
WALKING IN HOSPITAL ROOM: A LOT
MOVING FROM LYING ON BACK TO SITTING ON SIDE OF FLAT BED WITH BEDRAILS: A LITTLE
STANDING UP FROM CHAIR USING ARMS: A LITTLE

## 2024-12-28 ASSESSMENT — PATIENT HEALTH QUESTIONNAIRE - PHQ9
1. LITTLE INTEREST OR PLEASURE IN DOING THINGS: NOT AT ALL
SUM OF ALL RESPONSES TO PHQ9 QUESTIONS 1 & 2: 0
2. FEELING DOWN, DEPRESSED OR HOPELESS: NOT AT ALL

## 2024-12-28 ASSESSMENT — PAIN DESCRIPTION - PAIN TYPE: TYPE: ACUTE PAIN

## 2024-12-28 ASSESSMENT — PAIN DESCRIPTION - ORIENTATION: ORIENTATION: LEFT

## 2024-12-28 ASSESSMENT — PAIN DESCRIPTION - DESCRIPTORS: DESCRIPTORS: ACHING

## 2024-12-28 NOTE — TREATMENT PLAN
Patient was admitted overnight.  Seen in ER with  at bedside.  Currently on O2 at 3 L.  Complains of left shoulder pain and states radiates down toward waist.  Left shoulder pain as result of a fall 3 years ago.  States left back/side pain is progressively getting worse in the last 24 hours.  Rates pain as a 6 and describes as sharp pain but eases up with medication.  Says pain increases with deep breathing.  Denies dysuria or hematuria.  Reviewed with patient and  results of CT abdomen and pelvis showing nonobstructive calculus in the left renal.    Currently has a boot to right foot for a recent fracture.  She follows with Dr. Castaneda.  Will consult PT to assist with mobility.  Verbalizes it is hard to use her walker with the boot and the left shoulder pain.  Encouraged her to talk with PCP at discharge to discuss pain management.  Labs ordered for a.m.

## 2024-12-28 NOTE — ED PROVIDER NOTES
HPI   Chief Complaint   Patient presents with    Shortness of Breath     C/o left shoulder pain x 1 month since she started using a walker d/t a broken foot. C/o increased pain with breathing since yesterday. C/o slight cough.        Patient presents with left shoulder and left rib pain.  States this started after she broke her right foot 2 to 3 weeks ago and had to start using her walker.  As she was utilizing her upper extremities more to help her ambulate she started to get discomfort in her left shoulder.  This then traveled down to the left ribs.  States it is very painful to touch.  She has not noticed any rash or discoloration.  She denies any fall or injury.  She denies any shortness of breath.      History provided by:  Patient          Patient History   Past Medical History:   Diagnosis Date    Old myocardial infarction 04/06/2018    History of myocardial infarction    Personal history of malignant neoplasm of breast     History of malignant neoplasm of breast    Personal history of other diseases of the circulatory system 04/06/2018    History of hypertension    Personal history of other diseases of the female genital tract 04/06/2018    History of uterine prolapse    Personal history of other endocrine, nutritional and metabolic disease 04/06/2018    History of hyperlipidemia    Personal history of other medical treatment     H/O mammogram     Past Surgical History:   Procedure Laterality Date    APPENDECTOMY  04/06/2018    Appendectomy    BI MAMMO BILATERAL SCREENING  08/30/2023    cat 2    BI MAMMO BILATERAL SCREENING Bilateral 09/03/2024    CAT 2    CATARACT EXTRACTION Bilateral 02/2024    COLONOSCOPY  05/16/2018    REPEAT 10YRS/DR MELGAR    INCISIONAL BREAST BIOPSY  04/06/2018    Incisional Breast Biopsy    OTHER SURGICAL HISTORY  04/06/2018    Breast Surgery Revision Of Reconstructed Right Breast    OTHER SURGICAL HISTORY  09/13/2021    Hip replacement    OTHER SURGICAL HISTORY  12/13/2019     Tubal ligation    OTHER SURGICAL HISTORY  12/13/2019    Kidney surgery    OTHER SURGICAL HISTORY  12/13/2019    Hysterectomy    OTHER SURGICAL HISTORY  12/13/2019    Cardiac catheterization with stent placement    OTHER SURGICAL HISTORY  12/13/2019    Cardiac catheterization    OTHER SURGICAL HISTORY  12/13/2019    Myringotomy with tube placement    OTHER SURGICAL HISTORY  06/11/2009    Right mastectomy with SLNB     ER/VT +  HER -2     Family History   Problem Relation Name Age of Onset    Hypertension Mother      Heart attack Mother      Heart disease Mother      Skin cancer Father      Heart attack Father      Kidney disease Father      Heart disease Father      Asthma Sister      Hypertension Daughter       Social History     Tobacco Use    Smoking status: Never    Smokeless tobacco: Never   Vaping Use    Vaping status: Never Used   Substance Use Topics    Alcohol use: Yes    Drug use: Never       Physical Exam   ED Triage Vitals   Temperature Heart Rate Respirations BP   12/27/24 1624 12/27/24 1624 12/27/24 1624 12/27/24 1624   36.3 °C (97.4 °F) 84 18 160/67      Pulse Ox Temp Source Heart Rate Source Patient Position   12/27/24 1624 12/27/24 1624 12/27/24 1624 12/27/24 1749   95 % Oral Monitor Sitting      BP Location FiO2 (%)     12/27/24 1749 --     Left arm        Physical Exam  Vitals and nursing note reviewed.   Constitutional:       General: She is not in acute distress.     Appearance: Normal appearance. She is well-developed, well-groomed and overweight. She is not ill-appearing or toxic-appearing.   HENT:      Head: Normocephalic.      Right Ear: External ear normal.      Left Ear: External ear normal.      Nose: Nose normal.      Mouth/Throat:      Lips: Pink. No lesions.      Mouth: Mucous membranes are moist.   Cardiovascular:      Rate and Rhythm: Normal rate and regular rhythm.      Pulses:           Radial pulses are 2+ on the right side and 2+ on the left side.        Dorsalis pedis  pulses are 2+ on the right side and 2+ on the left side.        Posterior tibial pulses are 2+ on the right side and 2+ on the left side.      Heart sounds: Normal heart sounds.   Pulmonary:      Effort: Pulmonary effort is normal.      Breath sounds: Normal breath sounds and air entry.   Chest:      Chest wall: Tenderness present.       Abdominal:      General: Bowel sounds are normal.      Palpations: Abdomen is soft.      Tenderness: There is no abdominal tenderness. There is no right CVA tenderness, left CVA tenderness or guarding.   Musculoskeletal:      Left shoulder: Tenderness present. No swelling, deformity, effusion, laceration, bony tenderness or crepitus. Decreased range of motion. Normal pulse.      Right lower leg: No tenderness. No edema.      Left lower leg: No tenderness. No edema.   Skin:     General: Skin is warm.      Capillary Refill: Capillary refill takes less than 2 seconds.      Findings: No rash.      Nails: There is no clubbing.   Neurological:      General: No focal deficit present.      Mental Status: She is alert and oriented to person, place, and time.      Cranial Nerves: No cranial nerve deficit or facial asymmetry.      Motor: No weakness.   Psychiatric:         Attention and Perception: Attention and perception normal.         Mood and Affect: Mood and affect normal.         Speech: Speech is rapid and pressured.         Behavior: Behavior normal. Behavior is cooperative.         Thought Content: Thought content normal.         Cognition and Memory: Cognition and memory normal.         Judgment: Judgment normal.           ED Course & MDM                  No data recorded     Jackie Coma Scale Score: 15 (12/27/24 1800 : Keiko Colin RN)                           Medical Decision Making  Patient presents with left shoulder and left rib pain.  States this started after she broke her right foot 2 to 3 weeks ago and had to start using her walker.  As she was utilizing her upper  extremities more to help her ambulate she started to get discomfort in her left shoulder.  This then traveled down to the left ribs.  States it is very painful to touch.  She has not noticed any rash or discoloration.  She denies any fall or injury.  She denies any shortness of breath.    Ddx: Cardiac, pulmonary, infection, PE, obstruction, musculoskeletal, other    Will obtain labs and x-ray clued and D-dimer.  Patient's troponin came back elevated but this appears to be her baseline in the 20s.  D-dimer was significantly elevated therefore CT chest was obtained.  Patient's renal function is improved from her typical baseline but still her GFR is low for a CT.  Therefore we will hydrate the patient.  Risk and benefit were weighed and patient if she does have a blood clot this would be fatal therefore we need to obtain a CT chest.  Influenza and COVID were negative.  BNP was not significantly elevated.  Patient's pain was treated with IV Dilaudid and Zofran.  Transfer care to attending at the conclusion of my shift.  Please see attending note for further description of patient care disposition.  We are awaiting CT chest.    Amount and/or Complexity of Data Reviewed  Labs: ordered. Decision-making details documented in ED Course.  Radiology: ordered and independent interpretation performed. Decision-making details documented in ED Course.  ECG/medicine tests: ordered and independent interpretation performed. Decision-making details documented in ED Course.     Details: Read by myself and attending showing normal sinus rhythm at a ventricular rate of 83 bpm.  Normal axis.  No ST segment elevation.  MN interval 156 with a QT of 386        Procedure  Procedures     Luba Valerio PA-C  12/27/24 1941

## 2024-12-28 NOTE — ED PROVIDER NOTES
Emergency Medicine Transition of Care Note.    I received Brit Lopez in signout from Dr. Valerio.  Please see the previous ED provider note for all HPI, PE and MDM up to the time of signout at 2200. This is in addition to the primary record.  Patient had recent fracture of her foot and has been using a walker.  Patient states symptoms have worsened since she used the walker over the last several days.  Patient denies any palpitations or shortness of breath.  I did go over all the results with the patient and she states that she is in too much pain to go home.  Patient will be admitted for further inpatient diagnostic studies.    In brief Brit Lopez is an 79 y.o. female presenting for   Chief Complaint   Patient presents with    Shortness of Breath     C/o left shoulder pain x 1 month since she started using a walker d/t a broken foot. C/o increased pain with breathing since yesterday. C/o slight cough.      At the time of signout we were awaiting: CT scan    Diagnoses as of 12/28/24 0044   Intercostal pain     CT angio chest for pulmonary embolism   Final Result   No evidence of pulmonary embolism.   Bibasilar atelectasis is demonstrated with trace left pleural   effusion.  No distinct lobar consolidation or evidence for pulmonary   edema.    Signed by Forrest Arce MD      XR ribs 2 views left w chest pa or ap   Final Result   Cardiomegaly.  Resolved infiltrates since the previous study of   8/25/2024 with minimal residual atelectasis or pleural thickening at   the left base.   No fracture identified..   Signed by Hudson Moya MD         Labs Reviewed   COMPREHENSIVE METABOLIC PANEL - Abnormal       Result Value    Glucose 148 (*)     Sodium 137      Potassium 3.9      Chloride 105      Bicarbonate 22      Anion Gap 14      Urea Nitrogen 24 (*)     Creatinine 1.59 (*)     eGFR 33 (*)     Calcium 10.0      Albumin 4.4      Alkaline Phosphatase 59      Total Protein 7.3      AST 21      Bilirubin, Total 1.3  (*)     ALT 15     TROPONIN I, HIGH SENSITIVITY - Abnormal    Troponin I, High Sensitivity 29 (*)     Narrative:     Less than 99th percentile of normal range cutoff-  Female and children under 18 years old <14 ng/L; Male <21 ng/L: Negative  Repeat testing should be performed if clinically indicated.     Female and children under 18 years old 14-50 ng/L; Male 21-50 ng/L:  Consistent with possible cardiac damage and possible increased clinical   risk. Serial measurements may help to assess extent of myocardial damage.     >50 ng/L: Consistent with cardiac damage, increased clinical risk and  myocardial infarction. Serial measurements may help assess extent of   myocardial damage.      NOTE: Children less than 1 year old may have higher baseline troponin   levels and results should be interpreted in conjunction with the overall   clinical context.     NOTE: Troponin I testing is performed using a different   testing methodology at [facility] than at other   [facility]. Direct result comparisons should only   be made within the same method.   B-TYPE NATRIURETIC PEPTIDE - Abnormal     (*)     Narrative:        <100 pg/mL - Heart failure unlikely  100-299 pg/mL - Intermediate probability of acute heart                  failure exacerbation. Correlate with clinical                  context and patient history.    >=300 pg/mL - Heart Failure likely. Correlate with clinical                  context and patient history.    BNP testing is performed using different testing methodology at [facility] than at other [facility]. Direct result comparisons should only be made within the same method.      CBC - Abnormal    WBC 11.8 (*)     nRBC 0.0      RBC 4.71      Hemoglobin 14.0      Hematocrit 41.5      MCV 88      MCH 29.7      MCHC 33.7      RDW 13.7      Platelets 240     TROPONIN I, HIGH SENSITIVITY - Abnormal    Troponin I, High Sensitivity 28 (*)     Narrative:     Less than  99th percentile of normal range cutoff-  Female and children under 18 years old <14 ng/L; Male <21 ng/L: Negative  Repeat testing should be performed if clinically indicated.     Female and children under 18 years old 14-50 ng/L; Male 21-50 ng/L:  Consistent with possible cardiac damage and possible increased clinical   risk. Serial measurements may help to assess extent of myocardial damage.     >50 ng/L: Consistent with cardiac damage, increased clinical risk and  myocardial infarction. Serial measurements may help assess extent of   myocardial damage.      NOTE: Children less than 1 year old may have higher baseline troponin   levels and results should be interpreted in conjunction with the overall   clinical context.     NOTE: Troponin I testing is performed using a different   testing methodology at [facility] than at other   [facility]. Direct result comparisons should only   be made within the same method.   D-DIMER, VTE EXCLUSION - Abnormal    D-Dimer, Quantitative VTE Exclusion 1,060 (*)     Narrative:     The VTE Exclusion D-Dimer assay is reported in ng/mL Fibrinogen Equivalent Units (FEU).    Per 's instructions for use, a value of less than 500 ng/mL (FEU) may help to exclude DVT or PE in outpatients when the assay is used with a clinical pretest probability assessment.(AEMR must utilize and document eCalc 'Wells Score Deep Vein Thrombosis Risk' for DVT exclusion only. Emergency Department should utilize  Guidelines for Emergency Department Use of the VTE Exclusion D-Dimer and Clinical Pretest probability assessment model for DVT or PE exclusion.)   INFLUENZA A AND B PCR - Normal    Flu A Result Not Detected      Flu B Result Not Detected      Narrative:     This assay is an in vitro diagnostic multiplex nucleic acid amplification test for the detection and discrimination of Influenza A & B from nasopharyngeal specimens, and has been validated for use at [facility]. Negative results do not preclude Influenza A/B infections, and should not be used as the sole basis for diagnosis, treatment, or other management decisions. If Influenza A/B and RSV PCR results are negative, testing for Parainfluenza virus, Adenovirus and Metapneumovirus is routinely performed for [facility] pediatric oncology and intensive care inpatients, and is available on other patients by placing an add-on request.   SARS-COV-2 PCR - Normal    Coronavirus 2019, PCR Not Detected      Narrative:     This assay has received FDA Emergency Use Authorization (EUA) and is only authorized for the duration of time that circumstances exist to justify the authorization of the emergency use of in vitro diagnostic tests for the detection of SARS-CoV-2 virus and/or diagnosis of COVID-19 infection under section 564(b)(1) of the Act, 21 U.S.C. 360bbb-3(b)(1). This assay is an in vitro diagnostic nucleic acid amplification test for the qualitative detection of SARS-CoV-2 from nasopharyngeal specimens and has been validated for use at [facility]. Negative results do not preclude COVID-19 infections and should not be used as the sole basis for diagnosis, treatment, or other management decisions.     URINALYSIS WITH REFLEX CULTURE AND MICROSCOPIC    Narrative:     The following orders were created for panel order Urinalysis with Reflex Culture and Microscopic.  Procedure                               Abnormality         Status                     ---------                               -----------         ------                     Urinalysis with Reflex C...[[id number]]                                                 Extra Urine Gray Tube[[id number]]                                                         Please view results for these tests on the individual orders.   URINALYSIS WITH REFLEX CULTURE AND MICROSCOPIC   EXTRA URINE GRAY TUBE   TROPONIN I, HIGH SENSITIVITY      Medical Decision  Making      Final diagnoses:   [R07.82] Intercostal pain           Procedure  Procedures    Elroy Manzanares, DO Elroy Manzanares DO  12/28/24 0044

## 2024-12-28 NOTE — PROGRESS NOTES
"Physical Therapy    Physical Therapy Evaluation    Patient Name: Brit Lopez \"Valorie\"  MRN: [id number]  Today's Date: 12/28/2024   Time Calculation  Start Time: 1439  Stop Time: 1459  Time Calculation (min): 20 min    Assessment/Plan   Patient is able to perform all mobility independently/mod independently.  She is able to maintain WB status RLE however she relies heavily on her Ues to do so which is irritating her L shoulder.  PT reviewed use of ice to shoulder but patient states she has tried and it didn't help.  States she is unable to reduce the amount of WB ON LUE with use of FWW.  No further acute PT needs at this time.  Patient may benefit from community based PT for her L shoulder pain after hospital discharge, however it is likely not going to improve until she no longer needs to use FWW.    PT Assessment  Barriers to Discharge Home: No anticipated barriers  Evaluation/Treatment Tolerance: Patient tolerated treatment well  End of Session Communication: Bedside nurse  End of Session Patient Position: Bed, 3 rail up, Alarm off, not on at start of session (call light in reach)  IP OR SWING BED PT PLAN  Inpatient or Swing Bed: Inpatient  PT Plan  PT Eval Only Reason: No acute PT needs identified  PT Recommended Transfer Status: Independent  PT - OK to Discharge: Yes (once medically appropriate)    Subjective     Current Problem:  Patient Active Problem List   Diagnosis    Mixed hyperlipidemia    Hypertension    Metabolic acidosis    KASSI on CPAP    Vitamin D insufficiency    Chronic kidney disease (CKD), stage III (moderate) (Multi)    Acute myocardial infarction    PVD (peripheral vascular disease) (CMS-[facility])    Obesity, morbid (Multi)    Malignant neoplasm of female breast, unspecified estrogen receptor status, unspecified laterality, unspecified site of breast    Fat in stool    Acute hypoxic respiratory failure (Multi)    Acute pulmonary edema    Congestive heart failure, unspecified HF chronicity, " unspecified heart failure type    Pelvic floor dysfunction in female    Chest pain       General Visit Information:  General  Reason for Referral: impaired moblity; 79 y.o. female presenting with left-sided flank and back pain as well pain in rib cage. Had R foot 5th metatarsal fracture one month ago, wearing boot and is 50% weight bearing per patient  Referred By: Krista Bear, APRN-CNP  Past Medical History Relevant to Rehab: Coronary artery disease/MI  Hypertension  Hyperlipidemia  Hypothyroidism  Chronic pain  Unsure whether she had abnormal heart rhythm or irregular rhythm in the past but thinks she may have paroxysmal A-fib  Chronic leg swelling on water pills per the patient, could not tell whether she has CHF  Denies having COPD to me.  Family/Caregiver Present: No  Prior to Session Communication: Bedside nurse  Patient Position Received: Bed, 3 rail up, Alarm off, not on at start of session  General Comment: patient agreeable to assessment    Home Living:  Home Living  Type of Home: House  Lives With: Spouse  Home Adaptive Equipment: Walker rolling or standard (knee scooter)  Home Layout: One level  Home Access: Stairs to enter without rails  Entrance Stairs-Number of Steps: 1  Bathroom Shower/Tub: Walk-in shower  Bathroom Equipment: Grab bars in shower, Shower chair with back  Home Living Comments: sleeps in regular bed    Prior Level of Function:  Prior Function Per Pt/Caregiver Report  ADL Assistance: Independent  Ambulatory Assistance: Independent (no device until foot fracture a month ago and now mod Indep with FWW)    Precautions:  Precautions  Medical Precautions: Fall precautions, Oxygen therapy device and L/min (2L;  50% WB RLE with boot)    Vital Signs:  Vital Signs  SpO2: 92 % (on 2L following ambulation)  Objective     Pain:  Pain Assessment  Pain Assessment: 0-10  0-10 (Numeric) Pain Score: 5 - Moderate pain (no pain at rest, 5 withmoblity)  Pain Type: Chronic pain  Pain Orientation: Left  (side/shoulder)    Cognition:  Cognition  Orientation Level: Oriented X4    General Assessments:  General Observation  General Observation: cooperative   Activity Tolerance  Endurance: Endurance does not limit participation in activity           Coordination  Movements are Fluid and Coordinated: Yes     Static Sitting Balance  Static Sitting-Balance Support: Feet supported  Static Sitting-Level of Assistance: Independent  Dynamic Sitting Balance  Dynamic Sitting-Balance Support: Feet supported  Dynamic Sitting-Level of Assistance: Independent  Static Standing Balance  Static Standing-Balance Support: Bilateral upper extremity supported  Static Standing-Level of Assistance: Modified independent  Dynamic Standing Balance  Dynamic Standing-Balance Support: Bilateral upper extremity supported  Dynamic Standing-Level of Assistance: Modified independent  Dynamic Standing-Balance: Turning    Functional Assessments:     Bed Mobility  Bed Mobility: Yes  Bed Mobility 1  Bed Mobility 1: Supine to sitting, Sitting to supine  Level of Assistance 1: Independent  Transfers  Transfer: Yes  Transfer 1  Technique 1: Sit to stand, Stand to sit  Transfer Device 1: Gait belt (FWW)  Transfer Level of Assistance 1: Modified independent  Trials/Comments 1: states she feels like she is able to maintain 50% WB RLE but states she is not able to lightly hold herself with LUE  Ambulation/Gait Training  Ambulation/Gait Training Performed: Yes  Ambulation/Gait Training 1  Surface 1: Level tile  Device 1: Rolling walker  Gait Support Devices: Gait belt  Assistance 1: Modified independent  Comments/Distance (ft) 1: 24'; states she feels she is able to maintain 50% WB but not able to lighten up pressure/support on LUE t odo so            Outcome Measures:  [facility] Basic Mobility  Turning from your back to your side while in a flat bed without using bedrails: None  Moving from lying on your back to sitting on the side of a flat bed without using  bedrails: None  Moving to and from bed to chair (including a wheelchair): None  Standing up from a chair using your arms (e.g. wheelchair or bedside chair): None  To walk in hospital room: None  Climbing 3-5 steps with railing: Total  Basic Mobility - Total Score: 21              Goals: no Acute PT needs    Education Documentation  Mobility Training, taught by Génesis Cooper PT at 12/28/2024  3:37 PM.  Learner: Patient  Readiness: Acceptance  Method: Explanation  Response: Verbalizes Understanding, Demonstrated Understanding  Comment: 50% WB RLE;  less WB of LUE on walker to ease L side/shoulder pain/discomfort    Education Comments  No comments found.

## 2024-12-28 NOTE — H&P
"History Of Present Illness  Brit Lopez \"Valorie\" is a 79 y.o. female presenting with left-sided flank and back pain as well pain in rib cage.  Denies nausea vomiting or shortness of breath.  Slight dry cough and hurts with breathing.  Denies fall or trauma lately.  She leaned on 1 side last night and felt pain afterwards as pressure but no heaviness or exertional nature to symptoms.  No palpitations or dizziness or syncope.  No headache or neck pain or focal weakness.  She has chronic pain.  No URI.  She fell a month ago and injured right ankle has boot in place.  Almost a year ago she had a fall that led to her chronic pain.  No dizziness or otorrhea rhinorrhea or dysphagia slurred speech or blurry vision.  No bleeding from anywhere.  No other joint pains or skin rash at present.  No appetite change or weight loss.  She has left shoulder pain for a month.  May have probably pulled a muscle.  No relationship of food to pain or meals or activity or GERD.  Pain reproducible on palpation.  She has been also dealing with right foot pain for 2 to 3 weeks after a fall and using walker.  She told me that she broke her right foot the time.  Generalized weakness deconditioning and ambulatory dysfunction.  Admitted here for pain management.  Her influenza and COVID test negative.  No worsening leg swelling and BNP not impressive.  EKG sinus rhythm.  No other complaints at this time.  CTA no PE or pneumothorax or pneumonia or edema  Past Medical History  Past Medical History:   Diagnosis Date    Old myocardial infarction 04/06/2018    History of myocardial infarction    Personal history of malignant neoplasm of breast     History of malignant neoplasm of breast    Personal history of other diseases of the circulatory system 04/06/2018    History of hypertension    Personal history of other diseases of the female genital tract 04/06/2018    History of uterine prolapse    Personal history of other endocrine, nutritional and " metabolic disease 04/06/2018    History of hyperlipidemia    Personal history of other medical treatment     H/O mammogram   Coronary artery disease/MI  Hypertension  Hyperlipidemia  Hypothyroidism  Chronic pain  Unsure whether she had abnormal heart rhythm or irregular rhythm in the past but thinks she may have paroxysmal A-fib  Chronic leg swelling on water pills per the patient, could not tell whether she has CHF  Denies having COPD to me.  Surgical History  Past Surgical History:   Procedure Laterality Date    APPENDECTOMY  04/06/2018    Appendectomy    BI MAMMO BILATERAL SCREENING  08/30/2023    cat 2    BI MAMMO BILATERAL SCREENING Bilateral 09/03/2024    CAT 2    CATARACT EXTRACTION Bilateral 02/2024    COLONOSCOPY  05/16/2018    REPEAT 10YRS/DR MELGAR    INCISIONAL BREAST BIOPSY  04/06/2018    Incisional Breast Biopsy    OTHER SURGICAL HISTORY  04/06/2018    Breast Surgery Revision Of Reconstructed Right Breast    OTHER SURGICAL HISTORY  09/13/2021    Hip replacement    OTHER SURGICAL HISTORY  12/13/2019    Tubal ligation    OTHER SURGICAL HISTORY  12/13/2019    Kidney surgery    OTHER SURGICAL HISTORY  12/13/2019    Hysterectomy    OTHER SURGICAL HISTORY  12/13/2019    Cardiac catheterization with stent placement    OTHER SURGICAL HISTORY  12/13/2019    Cardiac catheterization    OTHER SURGICAL HISTORY  12/13/2019    Myringotomy with tube placement    OTHER SURGICAL HISTORY  06/11/2009    Right mastectomy with SLNB     ER/MS +  HER -2        Social History  She reports that she has never smoked. She has never used smokeless tobacco. She reports current alcohol use. She reports that she does not use drugs.    Family History  Family History   Problem Relation Name Age of Onset    Hypertension Mother      Heart attack Mother      Heart disease Mother      Skin cancer Father      Heart attack Father      Kidney disease Father      Heart disease Father      Asthma Sister      Hypertension Daughter  "         Allergies  Amoxicillin-pot clavulanate, Citalopram, Hydralazine, Levofloxacin, Nitrofurantoin monohyd/m-cryst, Sulfa (sulfonamide antibiotics), and Thiazides    Review of Systems  All other 12 point review of systems negative except HPI  Physical Exam   General Appearance: AAO x 3,   Skin: skin color pink, warm, and dry; no suspicious rashes or lesions  Eyes : PERRL, EOM's intact  ENT: mucous membranes pink and moist  Neck: normocephalic  Respiratory: lungs clear to auscultation anteriorly; no wheezing, rhonchi, or crackles.  Tenderness to palpation in left rib cage area as well left flank  Heart: regular rate and rhythm. KEEGAN  Abdomen: Nondistended, positive bowel sounds x4, soft,  nontender  Extremities: no edema, right lower extremity ankle in boot  Peripheral pulses: normal x4 extremities  Neuro: alert, coherent and conversant, no focal motor deficits  Last Recorded Vitals  Blood pressure 147/77, pulse 83, temperature 36.3 °C (97.4 °F), temperature source Oral, resp. rate 16, height 1.626 m (5' 4\"), weight 86.6 kg (191 lb), SpO2 (!) 93%.    Relevant Results  Scheduled medications  aspirin, 81 mg, oral, Daily  carvedilol, 25 mg, oral, BID  cilostazol, 50 mg, oral, BID  enoxaparin, 40 mg, subcutaneous, q24h  furosemide, 40 mg, oral, Daily  levothyroxine, 100 mcg, oral, Daily  NIFEdipine ER, 90 mg, oral, Daily  polyethylene glycol, 17 g, oral, Daily  rosuvastatin, 20 mg, oral, Daily  spironolactone, 25 mg, oral, Daily      Continuous medications     PRN medications  PRN medications: acetaminophen **OR** acetaminophen **OR** acetaminophen, acetaminophen **OR** acetaminophen **OR** acetaminophen, HYDROmorphone, HYDROmorphone, ondansetron **OR** ondansetron    Results for orders placed or performed during the hospital encounter of 12/27/24 (from the past 24 hours)   Influenza A, and B PCR   Result Value Ref Range    Flu A Result Not Detected Not Detected    Flu B Result Not Detected Not Detected   Sars-CoV-2 " PCR   Result Value Ref Range    Coronavirus 2019, PCR Not Detected Not Detected   Comprehensive metabolic panel   Result Value Ref Range    Glucose 148 (H) 74 - 99 mg/dL    Sodium 137 136 - 145 mmol/L    Potassium 3.9 3.5 - 5.3 mmol/L    Chloride 105 98 - 107 mmol/L    Bicarbonate 22 21 - 32 mmol/L    Anion Gap 14 10 - 20 mmol/L    Urea Nitrogen 24 (H) 6 - 23 mg/dL    Creatinine 1.59 (H) 0.50 - 1.05 mg/dL    eGFR 33 (L) >60 mL/min/1.73m*2    Calcium 10.0 8.6 - 10.3 mg/dL    Albumin 4.4 3.4 - 5.0 g/dL    Alkaline Phosphatase 59 33 - 136 U/L    Total Protein 7.3 6.4 - 8.2 g/dL    AST 21 9 - 39 U/L    Bilirubin, Total 1.3 (H) 0.0 - 1.2 mg/dL    ALT 15 7 - 45 U/L   Troponin I, High Sensitivity   Result Value Ref Range    Troponin I, High Sensitivity 29 (H) 0 - 13 ng/L   B-Type Natriuretic Peptide   Result Value Ref Range     (H) 0 - 99 pg/mL   CBC   Result Value Ref Range    WBC 11.8 (H) 4.4 - 11.3 x10*3/uL    nRBC 0.0 0.0 - 0.0 /100 WBCs    RBC 4.71 4.00 - 5.20 x10*6/uL    Hemoglobin 14.0 12.0 - 16.0 g/dL    Hematocrit 41.5 36.0 - 46.0 %    MCV 88 80 - 100 fL    MCH 29.7 26.0 - 34.0 pg    MCHC 33.7 32.0 - 36.0 g/dL    RDW 13.7 11.5 - 14.5 %    Platelets 240 150 - 450 x10*3/uL   Troponin I, High Sensitivity   Result Value Ref Range    Troponin I, High Sensitivity 28 (H) 0 - 13 ng/L   D-Dimer, VTE Exclusion   Result Value Ref Range    D-Dimer, Quantitative VTE Exclusion 1,060 (H) <=500 ng/mL FEU   Troponin I, High Sensitivity   Result Value Ref Range    Troponin I, High Sensitivity 27 (H) 0 - 13 ng/L     *Note: Due to a large number of results and/or encounters for the requested time period, some results have not been displayed. A complete set of results can be found in Results Review.       CT angio chest for pulmonary embolism    Result Date: 12/28/2024  STUDY: CT Angiogram of the Chest; 12/27/2024 at 9:30 PM INDICATION: Shortness of breath, elevated D-dimer; recent fracture. COMPARISON: CTA chest 8/9/2024, CTA  chest 6/19/2024. ACCESSION NUMBER(S): [id number] ORDERING CLINICIAN: TEREZA JOE TECHNIQUE:  CTA of the chest was performed with intravenous contrast. Images are reviewed and processed at a workstation according to the CT angiogram protocol with 3-D and/or MIP post processing imaging generated.  Omnipaque 350 69 mL was administered intravenously. Automated mA/kV exposure control was utilized and patient examination was performed in strict accordance with principles of ALARA. FINDINGS: Pulmonary arteries are adequately opacified without acute or chronic filling defects.  The thoracic aorta is normal in course and caliber without dissection or aneurysm. The heart is normal in size without pericardial effusion.  Thoracic lymph nodes are not enlarged. Trace left pleural effusion.  There is no pleural thickening or pneumothorax.  The airways are patent.  Mild bibasilar atelectasis. Lungs are without suspicious nodules. Upper abdomen demonstrates no acute pathology. There are no acute fractures.  No suspicious bony lesions.    No evidence of pulmonary embolism. Bibasilar atelectasis is demonstrated with trace left pleural effusion.  No distinct lobar consolidation or evidence for pulmonary edema. Signed by Forrest Arce MD    XR ribs 2 views left w chest pa or ap    Result Date: 12/27/2024  STUDY: Left Rib and Chest Radiographs; 12/27/2024 at 5:54 PM. INDICATION: Pain. COMPARISON: XR chest 8/25/2024, 8/7/2024; XR left ribs/chest 5/16/2024. ACCESSION NUMBER(S): [id number] ORDERING CLINICIAN: TEREZA JOE TECHNIQUE:  Frontal chest and two view(s) of the left ribs (five total images). FINDINGS:  CARDIOMEDIASTINAL SILHOUETTE: The heart is enlarged..  LUNGS: Marked improvement in lung aeration since previous study.  Question minimal residual atelectasis or pleural thickening at the left base. No pneumothorax..  ABDOMEN: No remarkable upper abdominal findings. Patient is status post at least partial right mastectomy.  Calcification noted over the lower portion of the right breast, stable. LEFT RIBS:  No fracture identified.. OTHER VISUALIZED BONES: No acute osseous changes.    Cardiomegaly.  Resolved infiltrates since the previous study of 8/25/2024 with minimal residual atelectasis or pleural thickening at the left base. No fracture identified.. Signed by Hudson Moya MD            Assessment/Plan   Assessment & Plan  Chest pain    79-year-old female with history of  Chronic pain, falls  Coronary artery disease  Hypertension   hyperlipidemia  Hypothyroidism  Questionable CHF   possible chronic kidney disease  Presenting with  Left sided rib cage pain as well flank pain/back  Likely musculoskeletal from possible pulled muscle  Or costochondritis or pleurodynia or pleurisy less likely from heart  No URI  Troponin leak from nonischemic myocardial injury likely  Possible RAVIN or chronic kidney disease  Plan  Cardiopulmonary monitoring  Troponins leak from nonischemic myocardial injury  Flu and COVID-19 negative  Check urinalysis  CT abdomen pelvis  Leukocytosis possibly reactive  Continue home medicines  Pain control  Slight creatinine elevation, encourage oral hydration  May have chronic kidney disease  Aspirin and statins for coronary artery disease prophylaxis, hyperlipidemia  On carvedilol 25 mg p.o. twice daily for hypertension as well nifedipine  Continue Aldactone 25 Mg daily  Lovenox for DVT prophylaxis  Continue Lasix 40 Mg p.o. daily at this point  Follow BMP and urine output  Monitor renal function  On Synthroid for hypothyroidism  Tylenol, Dilaudid IV for pain control  Physical therapy evaluation  Supportive care symptomatic management  Education counseling  Famotidine for GI prophylaxis  Daily CBC BMP and follow vitals  Also to follow cardiology outpatient  Monitor carefully on telemetry  Pain localized mainly in left rib cage area and slightly in left flank, will image the area  Incentive spirometry  Fall, aspiration,  seizure precautions  Neurochecks if required  Avoid polypharmacy, avoid sedatives, can try muscle relaxants if really necessary  Further management as clinical course evolves                 Terrence Padilla MD

## 2024-12-28 NOTE — CARE PLAN
The patient's goals for the shift include  increase strength     The clinical goals for the shift include pain free    Over the shift, the patient did not make progress toward the following goals. Barriers to progression include   Problem: Skin  Goal: Decreased wound size/increased tissue granulation at next dressing change  Outcome: Progressing  Goal: Participates in plan/prevention/treatment measures  Outcome: Progressing  Goal: Prevent/manage excess moisture  Outcome: Progressing  Goal: Prevent/minimize sheer/friction injuries  Outcome: Progressing  Goal: Promote/optimize nutrition  Outcome: Progressing  Goal: Promote skin healing  Outcome: Progressing   . Recommendations to address these barriers include   Problem: Fall/Injury  Goal: Not fall by end of shift  Outcome: Progressing  Goal: Be free from injury by end of the shift  Outcome: Progressing  Goal: Verbalize understanding of personal risk factors for fall in the hospital  Outcome: Progressing  Goal: Verbalize understanding of risk factor reduction measures to prevent injury from fall in the home  Outcome: Progressing  Goal: Use assistive devices by end of the shift  Outcome: Progressing  Goal: Pace activities to prevent fatigue by end of the shift  Outcome: Progressing   .

## 2024-12-29 VITALS
RESPIRATION RATE: 16 BRPM | DIASTOLIC BLOOD PRESSURE: 60 MMHG | BODY MASS INDEX: 32.61 KG/M2 | OXYGEN SATURATION: 94 % | TEMPERATURE: 97.5 F | HEART RATE: 72 BPM | SYSTOLIC BLOOD PRESSURE: 130 MMHG | HEIGHT: 64 IN | WEIGHT: 191 LBS

## 2024-12-29 PROBLEM — R07.82 INTERCOSTAL PAIN: Status: ACTIVE | Noted: 2024-12-29

## 2024-12-29 LAB
ANION GAP SERPL CALC-SCNC: 12 MMOL/L (ref 10–20)
BUN SERPL-MCNC: 33 MG/DL (ref 6–23)
CALCIUM SERPL-MCNC: 9.9 MG/DL (ref 8.6–10.3)
CHLORIDE SERPL-SCNC: 106 MMOL/L (ref 98–107)
CO2 SERPL-SCNC: 22 MMOL/L (ref 21–32)
CREAT SERPL-MCNC: 1.83 MG/DL (ref 0.5–1.05)
EGFRCR SERPLBLD CKD-EPI 2021: 28 ML/MIN/1.73M*2
ERYTHROCYTE [DISTWIDTH] IN BLOOD BY AUTOMATED COUNT: 13.2 % (ref 11.5–14.5)
GLUCOSE SERPL-MCNC: 150 MG/DL (ref 74–99)
HCT VFR BLD AUTO: 36.7 % (ref 36–46)
HGB BLD-MCNC: 12.6 G/DL (ref 12–16)
HOLD SPECIMEN: NORMAL
MCH RBC QN AUTO: 30.5 PG (ref 26–34)
MCHC RBC AUTO-ENTMCNC: 34.3 G/DL (ref 32–36)
MCV RBC AUTO: 89 FL (ref 80–100)
NRBC BLD-RTO: 0 /100 WBCS (ref 0–0)
PLATELET # BLD AUTO: 194 X10*3/UL (ref 150–450)
POTASSIUM SERPL-SCNC: 4 MMOL/L (ref 3.5–5.3)
RBC # BLD AUTO: 4.13 X10*6/UL (ref 4–5.2)
SODIUM SERPL-SCNC: 136 MMOL/L (ref 136–145)
WBC # BLD AUTO: 8.8 X10*3/UL (ref 4.4–11.3)

## 2024-12-29 PROCEDURE — 1100000001 HC PRIVATE ROOM DAILY

## 2024-12-29 PROCEDURE — 2500000005 HC RX 250 GENERAL PHARMACY W/O HCPCS: Performed by: HOSPITALIST

## 2024-12-29 PROCEDURE — 80048 BASIC METABOLIC PNL TOTAL CA: CPT

## 2024-12-29 PROCEDURE — 94761 N-INVAS EAR/PLS OXIMETRY MLT: CPT

## 2024-12-29 PROCEDURE — 99232 SBSQ HOSP IP/OBS MODERATE 35: CPT

## 2024-12-29 PROCEDURE — 2500000002 HC RX 250 W HCPCS SELF ADMINISTERED DRUGS (ALT 637 FOR MEDICARE OP, ALT 636 FOR OP/ED): Performed by: HOSPITALIST

## 2024-12-29 PROCEDURE — 2500000001 HC RX 250 WO HCPCS SELF ADMINISTERED DRUGS (ALT 637 FOR MEDICARE OP): Performed by: HOSPITALIST

## 2024-12-29 PROCEDURE — 36415 COLL VENOUS BLD VENIPUNCTURE: CPT

## 2024-12-29 PROCEDURE — 2500000004 HC RX 250 GENERAL PHARMACY W/ HCPCS (ALT 636 FOR OP/ED)

## 2024-12-29 PROCEDURE — 2500000001 HC RX 250 WO HCPCS SELF ADMINISTERED DRUGS (ALT 637 FOR MEDICARE OP)

## 2024-12-29 PROCEDURE — 85027 COMPLETE CBC AUTOMATED: CPT

## 2024-12-29 RX ORDER — DICYCLOMINE HYDROCHLORIDE 10 MG/1
10 CAPSULE ORAL 2 TIMES DAILY PRN
Status: DISCONTINUED | OUTPATIENT
Start: 2024-12-29 | End: 2024-12-30 | Stop reason: HOSPADM

## 2024-12-29 RX ORDER — ENOXAPARIN SODIUM 100 MG/ML
30 INJECTION SUBCUTANEOUS EVERY 24 HOURS
Status: DISCONTINUED | OUTPATIENT
Start: 2024-12-30 | End: 2024-12-30 | Stop reason: HOSPADM

## 2024-12-29 RX ORDER — CEFTRIAXONE 1 G/50ML
1 INJECTION, SOLUTION INTRAVENOUS EVERY 24 HOURS
Status: DISCONTINUED | OUTPATIENT
Start: 2024-12-29 | End: 2024-12-30 | Stop reason: HOSPADM

## 2024-12-29 RX ORDER — KETOROLAC TROMETHAMINE 30 MG/ML
15 INJECTION, SOLUTION INTRAMUSCULAR; INTRAVENOUS EVERY 6 HOURS PRN
Status: ACTIVE | OUTPATIENT
Start: 2024-12-29 | End: 2024-12-30

## 2024-12-29 RX ADMIN — ATORVASTATIN CALCIUM 80 MG: 80 TABLET, FILM COATED ORAL at 20:21

## 2024-12-29 RX ADMIN — CILOSTAZOL 50 MG: 50 TABLET ORAL at 09:06

## 2024-12-29 RX ADMIN — Medication 2 L/MIN: at 12:32

## 2024-12-29 RX ADMIN — SPIRONOLACTONE 25 MG: 25 TABLET ORAL at 20:21

## 2024-12-29 RX ADMIN — CARVEDILOL 25 MG: 25 TABLET, FILM COATED ORAL at 09:05

## 2024-12-29 RX ADMIN — NIFEDIPINE 90 MG: 60 TABLET, EXTENDED RELEASE ORAL at 09:06

## 2024-12-29 RX ADMIN — KETOROLAC TROMETHAMINE 15 MG: 30 INJECTION, SOLUTION INTRAMUSCULAR at 03:22

## 2024-12-29 RX ADMIN — Medication 21 PERCENT: at 06:00

## 2024-12-29 RX ADMIN — PSYLLIUM HUSK 1 PACKET: 3.4 POWDER ORAL at 13:45

## 2024-12-29 RX ADMIN — FUROSEMIDE 40 MG: 40 TABLET ORAL at 09:06

## 2024-12-29 RX ADMIN — CARVEDILOL 25 MG: 25 TABLET, FILM COATED ORAL at 20:21

## 2024-12-29 RX ADMIN — CILOSTAZOL 50 MG: 50 TABLET ORAL at 20:21

## 2024-12-29 RX ADMIN — LEVOTHYROXINE SODIUM 100 MCG: 0.1 TABLET ORAL at 06:00

## 2024-12-29 RX ADMIN — DICYCLOMINE HYDROCHLORIDE 10 MG: 10 CAPSULE ORAL at 13:44

## 2024-12-29 RX ADMIN — ASPIRIN 81 MG: 81 TABLET, COATED ORAL at 09:05

## 2024-12-29 RX ADMIN — Medication 21 PERCENT: at 09:20

## 2024-12-29 RX ADMIN — FAMOTIDINE 20 MG: 20 TABLET ORAL at 09:05

## 2024-12-29 ASSESSMENT — PAIN SCALES - GENERAL
PAINLEVEL_OUTOF10: 0 - NO PAIN
PAINLEVEL_OUTOF10: 0 - NO PAIN

## 2024-12-29 ASSESSMENT — COGNITIVE AND FUNCTIONAL STATUS - GENERAL
DAILY ACTIVITIY SCORE: 24
MOBILITY SCORE: 21
WALKING IN HOSPITAL ROOM: A LITTLE
CLIMB 3 TO 5 STEPS WITH RAILING: A LOT

## 2024-12-29 ASSESSMENT — PAIN - FUNCTIONAL ASSESSMENT
PAIN_FUNCTIONAL_ASSESSMENT: 0-10
PAIN_FUNCTIONAL_ASSESSMENT: 0-10

## 2024-12-29 NOTE — ASSESSMENT & PLAN NOTE
"-Remains chest pain-free  -Troponin was mildly elevated    Rule out UTI  -UA positive for UTI.  Urine culture pending.  Started Rocephin.  Patient is hesitant to start antibiotics as she said that her urine \"always says the same thing\"  -Concerned that she only has 1 kidney and encouraged treatment of UTI  -Follows with Dr. Cain as outpatient    Left renal calculus  -CT abdomen and pelvis performed on 12/28 showed 7 mm nonobstructive calculus  -Continue pain management    History of CAD/hypertension/hyperlipidemia  -Continue Coreg, Pletal, nifedipine, Lasix    DVT prophylaxis: Continue Lovenox    Discharge disposition: Anticipate return home in 24 to 48 hours pending clinical course and urine culture results  "

## 2024-12-29 NOTE — CARE PLAN
Problem: Skin  Goal: Prevent/manage excess moisture  Outcome: Progressing  Flowsheets (Taken 12/28/2024 2020)  Prevent/manage excess moisture: Moisturize dry skin

## 2024-12-29 NOTE — PROGRESS NOTES
"Brit Lopez \"Valorie\" is a 79 y.o. female on day 0 of admission presenting with Chest pain.      Subjective   Patient resting quietly in bed and states that her pain is improving.  States she is able to take a deep breath without crying.  Verbalizes that pain has not completely resolved at this time.  Informed her of UA showing signs of UTI.  She denies complaints of dysuria, frequency, or hematuria.  She does not want antibiotics at this time but verbalizes that she only has 1 kidney and follows with Dr. Cain as outpatient.  When explained the rationale and necessity of antibiotics she states \"it always says the same thing.\"  She is referring to her urine and says that her gynecologist placed her on estrogen cream to help prevent UTIs.  Will continue antibiotic therapy at this time       Objective     Last Recorded Vitals  /57 (BP Location: Left arm, Patient Position: Lying)   Pulse 70   Temp 36.5 °C (97.7 °F) (Temporal)   Resp 16   Wt 86.6 kg (191 lb)   SpO2 95%   Intake/Output last 3 Shifts:    Intake/Output Summary (Last 24 hours) at 12/29/2024 1350  Last data filed at 12/29/2024 1304  Gross per 24 hour   Intake 1020 ml   Output 200 ml   Net 820 ml       Admission Weight  Weight: 86.6 kg (191 lb) (12/27/24 1624)    Daily Weight  12/27/24 : 86.6 kg (191 lb)    Image Results  CT abdomen pelvis wo IV contrast  Narrative: STUDY:  CT Abdomen and Pelvis without IV Contrast; 12/28/2024 9:35 PM   INDICATION:  Pain.   COMPARISON:  CTA chest 12/27/2024.  ACCESSION NUMBER(S):  [id number]  ORDERING CLINICIAN:  JOSEFINA CAMPBELL  TECHNIQUE:  CT of the abdomen and pelvis was performed.  Contiguous axial images  were obtained at 3 mm slice thickness through the abdomen and pelvis.   Coronal and sagittal reconstructions at 3 mm slice thickness were  performed. No intravenous contrast was administered.    Automated mA/kV exposure control was utilized and patient examination  was performed in strict accordance with " principles of ALARA.  FINDINGS:  Please note that the evaluation of vessels, lymph nodes and organs is  limited without intravenous contrast.      LOWER CHEST:  There is cardiomegaly noted with left atrial enlargement.  No  pericardial effusion.  There is a small left pleural effusion.  There  are consolidative changes noted most prominently at the left lung base  which represent atelectasis or pneumonitis.  Mild atelectatic changes  are also seen at the right lung base.     ABDOMEN:     LIVER:  No hepatomegaly.  Smooth surface contour.  Normal attenuation.     BILE DUCTS:  No intrahepatic or extrahepatic biliary ductal dilatation.     GALLBLADDER:  The gallbladder is visualized.  There is high density material within  the gallbladder which may represent small calculi, sludge or vicarious  excretion of contrast.  STOMACH:  No abnormalities identified.     PANCREAS:  No masses or ductal dilatation.     SPLEEN:  No splenomegaly.  There are calcifications within the spleen which may  represent sequelae of previous granulomatous disease.     ADRENAL GLANDS:  No thickening or nodules.     KIDNEYS AND URETERS:  Kidneys are normal in size and location.  There is a 7 mm  nonobstructive left renal calculus.  There subcentimeter  hypoattenuating lesions located within the left kidney which are too  small to characterize by size criteria.  Right kidney is not  visualized and presumed to be surgically absent.     PELVIS:     BLADDER:  No abnormalities identified.     REPRODUCTIVE ORGANS:  Status post hysterectomy.     BOWEL:  There is no evidence of bowel thickening dilatation to suggest  mechanical obstruction.  There is colonic diverticulosis noted without  CT evidence of diverticulitis.  There is a small amount of stool noted  within the colon.  Appendix is not visualized.     VESSELS:  There are severe calcific atherosclerotic changes of the infrarenal  abdominal aorta and iliac vessels noted..  Abdominal aorta is  normal  in caliber.      PERITONEUM/RETROPERITONEUM/LYMPH NODES:  No free fluid.  No pneumoperitoneum.  There is no evidence of significant abdominal or pelvic  lymphadenopathy by CT size criteria.     ABDOMINAL WALL:  There is a periumbilical hernia noted containing fat.  SOFT TISSUES:   No abnormalities identified.     BONES:  No acute fracture or aggressive osseous lesion.  There is a left hip  prosthesis noted producing significant beam hardening artifact  limiting interpretation within the left hemipelvis.  There are  degenerative changes of the lumbar spine noted.  Impression: 1.  Nonvisualization the appendix.  2.  Nonobstructive subcentimeter left renal calculus.  No  hydronephrosis or ureteral calculi.  3.  No intra-abdominal/pelvic fluid collections or pneumoperitoneum.  4.  Small left pleural effusion.  There is a left consolidative  disease noted which may represent atelectasis or pneumonitis.  There  is mild right lower lobe atelectasis.  5.  Other findings as stated above.  Signed by Jeremiah Agudelo MD  CT angio chest for pulmonary embolism  Narrative: STUDY:  CT Angiogram of the Chest; 12/27/2024 at 9:30 PM  INDICATION:  Shortness of breath, elevated D-dimer; recent fracture.  COMPARISON:  CTA chest 8/9/2024, CTA chest 6/19/2024.  ACCESSION NUMBER(S):  [id number]  ORDERING CLINICIAN:  TEREZA JOE  TECHNIQUE:  CTA of the chest was performed with intravenous contrast.   Images are reviewed and processed at a workstation according to the CT  angiogram protocol with 3-D and/or MIP post processing imaging  generated.  Omnipaque 350 69 mL was administered intravenously.  Automated mA/kV exposure control was utilized and patient examination  was performed in strict accordance with principles of ALARA.  FINDINGS:  Pulmonary arteries are adequately opacified without acute or chronic  filling defects.  The thoracic aorta is normal in course and caliber  without dissection or aneurysm.  The heart is normal in  size without pericardial effusion.  Thoracic  lymph nodes are not enlarged.  Trace left pleural effusion.  There is no pleural thickening or  pneumothorax.  The airways are patent.  Mild bibasilar atelectasis.   Lungs are without suspicious nodules.  Upper abdomen demonstrates no acute pathology.  There are no acute fractures.  No suspicious bony lesions.  Impression: No evidence of pulmonary embolism.  Bibasilar atelectasis is demonstrated with trace left pleural  effusion.  No distinct lobar consolidation or evidence for pulmonary  edema.   Signed by Forrest Arce MD      Physical Exam  General Appearance: AAO x 3, not in acute distress  Skin: skin color pink, warm, and dry; no suspicious rashes or lesions  Eyes : PERRL, EOM's intact  ENT: mucous membranes pink and moist  Neck: normocephalic  Respiratory: lungs clear to auscultation anteriorly; no wheezing, rhonchi, or crackles.   Heart: regular rate and rhythm. telemetry shows sinus rhythm  Abdomen: Nondistended, positive bowel sounds x4, soft,  nontender with today's assessment  Extremities: no edema   Peripheral pulses: normal x4 extremities  Neuro: alert, coherent and conversant, no focal motor deficits    Relevant Results  CT abdomen pelvis wo IV contrast    Result Date: 12/28/2024  STUDY: CT Abdomen and Pelvis without IV Contrast; 12/28/2024 9:35 PM INDICATION: Pain. COMPARISON: CTA chest 12/27/2024. ACCESSION NUMBER(S): [id number] ORDERING CLINICIAN: JOSEFINA CAMPBELL TECHNIQUE: CT of the abdomen and pelvis was performed.  Contiguous axial images were obtained at 3 mm slice thickness through the abdomen and pelvis. Coronal and sagittal reconstructions at 3 mm slice thickness were performed. No intravenous contrast was administered.  Automated mA/kV exposure control was utilized and patient examination was performed in strict accordance with principles of ALARA. FINDINGS: Please note that the evaluation of vessels, lymph nodes and organs is limited without  intravenous contrast.  LOWER CHEST: There is cardiomegaly noted with left atrial enlargement.  No pericardial effusion.  There is a small left pleural effusion.  There are consolidative changes noted most prominently at the left lung base which represent atelectasis or pneumonitis.  Mild atelectatic changes are also seen at the right lung base.  ABDOMEN:  LIVER: No hepatomegaly.  Smooth surface contour.  Normal attenuation.  BILE DUCTS: No intrahepatic or extrahepatic biliary ductal dilatation.  GALLBLADDER: The gallbladder is visualized.  There is high density material within the gallbladder which may represent small calculi, sludge or vicarious excretion of contrast. STOMACH: No abnormalities identified.  PANCREAS: No masses or ductal dilatation.  SPLEEN: No splenomegaly.  There are calcifications within the spleen which may represent sequelae of previous granulomatous disease.  ADRENAL GLANDS: No thickening or nodules.  KIDNEYS AND URETERS: Kidneys are normal in size and location.  There is a 7 mm nonobstructive left renal calculus.  There subcentimeter hypoattenuating lesions located within the left kidney which are too small to characterize by size criteria.  Right kidney is not visualized and presumed to be surgically absent.  PELVIS:  BLADDER: No abnormalities identified.  REPRODUCTIVE ORGANS: Status post hysterectomy.  BOWEL: There is no evidence of bowel thickening dilatation to suggest mechanical obstruction.  There is colonic diverticulosis noted without CT evidence of diverticulitis.  There is a small amount of stool noted within the colon.  Appendix is not visualized.  VESSELS: There are severe calcific atherosclerotic changes of the infrarenal abdominal aorta and iliac vessels noted..  Abdominal aorta is normal in caliber.  PERITONEUM/RETROPERITONEUM/LYMPH NODES: No free fluid.  No pneumoperitoneum. There is no evidence of significant abdominal or pelvic lymphadenopathy by CT size criteria.  ABDOMINAL  WALL: There is a periumbilical hernia noted containing fat. SOFT TISSUES: No abnormalities identified.  BONES: No acute fracture or aggressive osseous lesion.  There is a left hip prosthesis noted producing significant beam hardening artifact limiting interpretation within the left hemipelvis.  There are degenerative changes of the lumbar spine noted.    1.  Nonvisualization the appendix. 2.  Nonobstructive subcentimeter left renal calculus.  No hydronephrosis or ureteral calculi. 3.  No intra-abdominal/pelvic fluid collections or pneumoperitoneum. 4.  Small left pleural effusion.  There is a left consolidative disease noted which may represent atelectasis or pneumonitis.  There is mild right lower lobe atelectasis. 5.  Other findings as stated above. Signed by Jeremiah Agudelo MD    CT angio chest for pulmonary embolism    Result Date: 12/28/2024  STUDY: CT Angiogram of the Chest; 12/27/2024 at 9:30 PM INDICATION: Shortness of breath, elevated D-dimer; recent fracture. COMPARISON: CTA chest 8/9/2024, CTA chest 6/19/2024. ACCESSION NUMBER(S): [id number] ORDERING CLINICIAN: TEREZA JOE TECHNIQUE:  CTA of the chest was performed with intravenous contrast. Images are reviewed and processed at a workstation according to the CT angiogram protocol with 3-D and/or MIP post processing imaging generated.  Omnipaque 350 69 mL was administered intravenously. Automated mA/kV exposure control was utilized and patient examination was performed in strict accordance with principles of ALARA. FINDINGS: Pulmonary arteries are adequately opacified without acute or chronic filling defects.  The thoracic aorta is normal in course and caliber without dissection or aneurysm. The heart is normal in size without pericardial effusion.  Thoracic lymph nodes are not enlarged. Trace left pleural effusion.  There is no pleural thickening or pneumothorax.  The airways are patent.  Mild bibasilar atelectasis. Lungs are without suspicious nodules.  Upper abdomen demonstrates no acute pathology. There are no acute fractures.  No suspicious bony lesions.    No evidence of pulmonary embolism. Bibasilar atelectasis is demonstrated with trace left pleural effusion.  No distinct lobar consolidation or evidence for pulmonary edema. Signed by Forrest Arce MD    XR ribs 2 views left w chest pa or ap    Result Date: 12/27/2024  STUDY: Left Rib and Chest Radiographs; 12/27/2024 at 5:54 PM. INDICATION: Pain. COMPARISON: XR chest 8/25/2024, 8/7/2024; XR left ribs/chest 5/16/2024. ACCESSION NUMBER(S): [id number] ORDERING CLINICIAN: TEREZA JOE TECHNIQUE:  Frontal chest and two view(s) of the left ribs (five total images). FINDINGS:  CARDIOMEDIASTINAL SILHOUETTE: The heart is enlarged..  LUNGS: Marked improvement in lung aeration since previous study.  Question minimal residual atelectasis or pleural thickening at the left base. No pneumothorax..  ABDOMEN: No remarkable upper abdominal findings. Patient is status post at least partial right mastectomy. Calcification noted over the lower portion of the right breast, stable. LEFT RIBS:  No fracture identified.. OTHER VISUALIZED BONES: No acute osseous changes.    Cardiomegaly.  Resolved infiltrates since the previous study of 8/25/2024 with minimal residual atelectasis or pleural thickening at the left base. No fracture identified.. Signed by Hudson Moya MD    XR foot right 3+ views    Result Date: 12/17/2024  Interpreted By:  Neo Yoo, STUDY: XR FOOT RIGHT 3+ VIEWS; ;  12/16/2024 11:18 am   INDICATION: Signs/Symptoms:nondisplaced fx of 5th metatarsal bone.   ,S92.354A Nondisplaced fracture of fifth metatarsal bone, right foot, initial encounter for closed fracture   COMPARISON: 11/13/2024   ACCESSION NUMBER(S): [id number]   ORDERING CLINICIAN: RAHEL PERDOMO   FINDINGS: Right foot, three views   There is a subacute nondisplaced fracture through the base of the 5th metatarsal. No change in alignment. No other  fracture seen. The joints are maintained       Nondisplaced subacute fracture through the 5th metatarsal base     MACRO: None   Signed by: Neo Yoo 12/17/2024 9:19 PM Dictation workstation:   [id number]     Results for orders placed or performed during the hospital encounter of 12/27/24 (from the past 24 hours)   Urinalysis with Reflex Culture and Microscopic   Result Value Ref Range    Color, Urine Light-Yellow Light-Yellow, Yellow, Dark-Yellow    Appearance, Urine Turbid (N) Clear    Specific Gravity, Urine 1.013 1.005 - 1.035    pH, Urine 6.0 5.0, 5.5, 6.0, 6.5, 7.0, 7.5, 8.0    Protein, Urine NEGATIVE NEGATIVE, 10 (TRACE), 20 (TRACE) mg/dL    Glucose, Urine Normal Normal mg/dL    Blood, Urine NEGATIVE NEGATIVE    Ketones, Urine NEGATIVE NEGATIVE mg/dL    Bilirubin, Urine NEGATIVE NEGATIVE    Urobilinogen, Urine Normal Normal mg/dL    Nitrite, Urine 2+ (A) NEGATIVE    Leukocyte Esterase, Urine 250 Catalino/µL (A) NEGATIVE   Extra Urine Gray Tube   Result Value Ref Range    Extra Tube Hold for add-ons.    Microscopic Only, Urine   Result Value Ref Range    WBC, Urine 21-50 (A) 1-5, NONE /HPF    RBC, Urine NONE NONE, 1-2, 3-5 /HPF    Squamous Epithelial Cells, Urine 10-25 (FEW) Reference range not established. /HPF    Mucus, Urine FEW Reference range not established. /LPF   CBC   Result Value Ref Range    WBC 8.8 4.4 - 11.3 x10*3/uL    nRBC 0.0 0.0 - 0.0 /100 WBCs    RBC 4.13 4.00 - 5.20 x10*6/uL    Hemoglobin 12.6 12.0 - 16.0 g/dL    Hematocrit 36.7 36.0 - 46.0 %    MCV 89 80 - 100 fL    MCH 30.5 26.0 - 34.0 pg    MCHC 34.3 32.0 - 36.0 g/dL    RDW 13.2 11.5 - 14.5 %    Platelets 194 150 - 450 x10*3/uL   Basic Metabolic Panel   Result Value Ref Range    Glucose 150 (H) 74 - 99 mg/dL    Sodium 136 136 - 145 mmol/L    Potassium 4.0 3.5 - 5.3 mmol/L    Chloride 106 98 - 107 mmol/L    Bicarbonate 22 21 - 32 mmol/L    Anion Gap 12 10 - 20 mmol/L    Urea Nitrogen 33 (H) 6 - 23 mg/dL    Creatinine 1.83 (H) 0.50 - 1.05  "mg/dL    eGFR 28 (L) >60 mL/min/1.73m*2    Calcium 9.9 8.6 - 10.3 mg/dL     *Note: Due to a large number of results and/or encounters for the requested time period, some results have not been displayed. A complete set of results can be found in Results Review.     Scheduled medications  aspirin, 81 mg, oral, Daily  atorvastatin, 80 mg, oral, Nightly  carvedilol, 25 mg, oral, BID  cefTRIAXone, 1 g, intravenous, q24h  cilostazol, 50 mg, oral, BID  [START ON 12/30/2024] enoxaparin, 30 mg, subcutaneous, q24h  famotidine, 20 mg, oral, Daily  furosemide, 40 mg, oral, Daily  levothyroxine, 100 mcg, oral, Daily  NIFEdipine ER, 90 mg, oral, Daily  oxygen, , inhalation, Continuous - Inhalation  polyethylene glycol, 17 g, oral, Daily  psyllium, 1 packet, oral, BID  spironolactone, 25 mg, oral, Daily      Continuous medications     PRN medications  PRN medications: acetaminophen **OR** [DISCONTINUED] acetaminophen **OR** [DISCONTINUED] acetaminophen, acetaminophen **OR** [DISCONTINUED] acetaminophen **OR** [DISCONTINUED] acetaminophen, dicyclomine, HYDROmorphone, ketorolac, ondansetron ODT **OR** ondansetron, oxyCODONE-acetaminophen       Assessment/Plan      79-year-old female with history of, Chronic pain, falls, Coronary artery disease, Hypertension , hyperlipidemia, Hypothyroidism, Questionable CHF, possible chronic kidney disease. Presenting with Left sided rib cage pain as well flank pain/back  Likely musculoskeletal from possible pulled muscle  Or costochondritis or pleurodynia or pleurisy less likely from heart    Assessment & Plan  Chest pain  -Remains chest pain-free  -Troponin was mildly elevated    Rule out UTI  -UA positive for UTI.  Urine culture pending.  Started Rocephin.  Patient is hesitant to start antibiotics as she said that her urine \"always says the same thing\"  -Concerned that she only has 1 kidney and encouraged treatment of UTI  -Follows with Dr. Cain as outpatient    Left renal calculus  -CT abdomen " and pelvis performed on 12/28 showed 7 mm nonobstructive calculus  -Continue pain management    History of CAD/hypertension/hyperlipidemia  -Continue Coreg, Pletal, nifedipine, Lasix    DVT prophylaxis: Continue Lovenox    Discharge disposition: Anticipate return home in 24 to 48 hours pending clinical course and urine culture results    Time spent in documentation and exam: 18 minutes    Krista Bear, CELIA-CNP

## 2024-12-29 NOTE — NURSING NOTE
Pt is A&Ox4. Refuses bed and chair alarm and wants to ambulate on own without help. Pt educated on importance of alarms (safety for her as well as staffing), pt still denied need for alarms. Stated would call if she needed us. Pt has rt walking boot on from previous fracture and uses a walker. Pt is steady and follows proper safety protocol with mobility.

## 2024-12-30 VITALS
WEIGHT: 191 LBS | DIASTOLIC BLOOD PRESSURE: 68 MMHG | OXYGEN SATURATION: 95 % | TEMPERATURE: 97.3 F | BODY MASS INDEX: 32.61 KG/M2 | RESPIRATION RATE: 18 BRPM | HEIGHT: 64 IN | HEART RATE: 61 BPM | SYSTOLIC BLOOD PRESSURE: 145 MMHG

## 2024-12-30 PROBLEM — R07.9 CHEST PAIN: Status: RESOLVED | Noted: 2024-12-28 | Resolved: 2024-12-30

## 2024-12-30 PROBLEM — R07.82 INTERCOSTAL PAIN: Status: RESOLVED | Noted: 2024-12-29 | Resolved: 2024-12-30

## 2024-12-30 LAB
ANION GAP SERPL CALC-SCNC: 14 MMOL/L (ref 10–20)
BUN SERPL-MCNC: 44 MG/DL (ref 6–23)
CALCIUM SERPL-MCNC: 9.7 MG/DL (ref 8.6–10.3)
CHLORIDE SERPL-SCNC: 101 MMOL/L (ref 98–107)
CO2 SERPL-SCNC: 20 MMOL/L (ref 21–32)
CREAT SERPL-MCNC: 2.15 MG/DL (ref 0.5–1.05)
EGFRCR SERPLBLD CKD-EPI 2021: 23 ML/MIN/1.73M*2
ERYTHROCYTE [DISTWIDTH] IN BLOOD BY AUTOMATED COUNT: 13 % (ref 11.5–14.5)
GLUCOSE SERPL-MCNC: 160 MG/DL (ref 74–99)
HCT VFR BLD AUTO: 37.3 % (ref 36–46)
HGB BLD-MCNC: 12.9 G/DL (ref 12–16)
MCH RBC QN AUTO: 30.4 PG (ref 26–34)
MCHC RBC AUTO-ENTMCNC: 34.6 G/DL (ref 32–36)
MCV RBC AUTO: 88 FL (ref 80–100)
NRBC BLD-RTO: 0 /100 WBCS (ref 0–0)
PLATELET # BLD AUTO: 258 X10*3/UL (ref 150–450)
POTASSIUM SERPL-SCNC: 4 MMOL/L (ref 3.5–5.3)
RBC # BLD AUTO: 4.25 X10*6/UL (ref 4–5.2)
SODIUM SERPL-SCNC: 131 MMOL/L (ref 136–145)
WBC # BLD AUTO: 10.5 X10*3/UL (ref 4.4–11.3)

## 2024-12-30 PROCEDURE — 99239 HOSP IP/OBS DSCHRG MGMT >30: CPT | Performed by: PHYSICIAN ASSISTANT

## 2024-12-30 PROCEDURE — 94761 N-INVAS EAR/PLS OXIMETRY MLT: CPT

## 2024-12-30 PROCEDURE — 85027 COMPLETE CBC AUTOMATED: CPT

## 2024-12-30 PROCEDURE — 36415 COLL VENOUS BLD VENIPUNCTURE: CPT

## 2024-12-30 PROCEDURE — 2500000004 HC RX 250 GENERAL PHARMACY W/ HCPCS (ALT 636 FOR OP/ED): Performed by: HOSPITALIST

## 2024-12-30 PROCEDURE — 2500000001 HC RX 250 WO HCPCS SELF ADMINISTERED DRUGS (ALT 637 FOR MEDICARE OP): Performed by: HOSPITALIST

## 2024-12-30 PROCEDURE — 2500000004 HC RX 250 GENERAL PHARMACY W/ HCPCS (ALT 636 FOR OP/ED): Performed by: PHYSICIAN ASSISTANT

## 2024-12-30 PROCEDURE — 2500000005 HC RX 250 GENERAL PHARMACY W/O HCPCS: Performed by: HOSPITALIST

## 2024-12-30 PROCEDURE — 2500000001 HC RX 250 WO HCPCS SELF ADMINISTERED DRUGS (ALT 637 FOR MEDICARE OP)

## 2024-12-30 PROCEDURE — 80048 BASIC METABOLIC PNL TOTAL CA: CPT

## 2024-12-30 PROCEDURE — 2500000002 HC RX 250 W HCPCS SELF ADMINISTERED DRUGS (ALT 637 FOR MEDICARE OP, ALT 636 FOR OP/ED): Performed by: HOSPITALIST

## 2024-12-30 RX ADMIN — CARVEDILOL 25 MG: 25 TABLET, FILM COATED ORAL at 09:50

## 2024-12-30 RX ADMIN — SODIUM CHLORIDE 250 ML: 9 INJECTION, SOLUTION INTRAVENOUS at 09:50

## 2024-12-30 RX ADMIN — NIFEDIPINE 90 MG: 60 TABLET, EXTENDED RELEASE ORAL at 09:50

## 2024-12-30 RX ADMIN — CILOSTAZOL 50 MG: 50 TABLET ORAL at 09:50

## 2024-12-30 RX ADMIN — PSYLLIUM HUSK 1 PACKET: 3.4 POWDER ORAL at 09:50

## 2024-12-30 RX ADMIN — LEVOTHYROXINE SODIUM 100 MCG: 0.1 TABLET ORAL at 06:17

## 2024-12-30 RX ADMIN — FAMOTIDINE 20 MG: 20 TABLET ORAL at 09:50

## 2024-12-30 RX ADMIN — POLYETHYLENE GLYCOL 3350 17 G: 17 POWDER, FOR SOLUTION ORAL at 09:50

## 2024-12-30 RX ADMIN — Medication 21 PERCENT: at 11:44

## 2024-12-30 RX ADMIN — ONDANSETRON 4 MG: 2 INJECTION INTRAMUSCULAR; INTRAVENOUS at 00:18

## 2024-12-30 RX ADMIN — ASPIRIN 81 MG: 81 TABLET, COATED ORAL at 09:50

## 2024-12-30 ASSESSMENT — COGNITIVE AND FUNCTIONAL STATUS - GENERAL
CLIMB 3 TO 5 STEPS WITH RAILING: A LOT
MOBILITY SCORE: 21
DAILY ACTIVITIY SCORE: 24
WALKING IN HOSPITAL ROOM: A LITTLE

## 2024-12-30 ASSESSMENT — ACTIVITIES OF DAILY LIVING (ADL): LACK_OF_TRANSPORTATION: NO

## 2024-12-30 ASSESSMENT — PAIN SCALES - GENERAL: PAINLEVEL_OUTOF10: 0 - NO PAIN

## 2024-12-30 NOTE — PROGRESS NOTES
12/30/24 1419   Discharge Planning   Living Arrangements Spouse/significant other   Support Systems Spouse/significant other   Assistance Needed walker   Type of Residence Private residence   Number of Stairs to Enter Residence 1   Number of Stairs Within Residence 0   Do you have animals or pets at home? No   Who is requesting discharge planning? Provider   Home or Post Acute Services None   Expected Discharge Disposition Home   Financial Resource Strain   How hard is it for you to pay for the very basics like food, housing, medical care, and heating? Not hard   Housing Stability   In the last 12 months, was there a time when you were not able to pay the mortgage or rent on time? N   In the past 12 months, how many times have you moved where you were living? 0   At any time in the past 12 months, were you homeless or living in a shelter (including now)? N   Transportation Needs   In the past 12 months, has lack of transportation kept you from medical appointments or from getting medications? no   In the past 12 months, has lack of transportation kept you from meetings, work, or from getting things needed for daily living? No     Care Transitions: Patient reviewed in care round meting this AM, ADOD possibly today. Met with patient at bedside. Role of TC explained. Demographics, PCP and contacts verified. She prefers [facility] pharmacy in [place]. Denies any difficulty obtaining/affording medication. She has been independent at home with ADL's and drives self. States she uses a walker for ambulation since she fractured her foot. States she is fifty percent weight bearing to that foot. Wears a CPAP at night; supplier is [employer]. [facility] 24/21 per nursing. Discharge plan is return home, denies needs or in home services at this time.  to transport home at discharge. Medicare IMM reviewed, patient signed, copy provided, original placed in chart. Voiced understanding.  Care team available upon request. Latrice  Beata RN/TCC

## 2024-12-30 NOTE — CARE PLAN
The patient's goals for the shift include  no vomiting feel better    The clinical goals for the shift include no falls no falls, labs and vs wdl

## 2024-12-30 NOTE — NURSING NOTE
Discharge Note: 12/30/2024 1539 Discharged via wheelchair to private car accompanied by PCT, personal belongings taken with pt, no distress noted, no complaints voiced. Ab TEAGUE

## 2024-12-30 NOTE — DISCHARGE SUMMARY
"Discharge Diagnosis  Chest pain    Issues Requiring Follow-Up  Back pain    Test Results Pending At Discharge  Pending Labs       Order Current Status    Urine Culture Preliminary result            Hospital Course     Brit Lopez \"Robert" is a 79 y.o. female presenting to the ER on 12/28 with left-sided flank and back pain as well pain in rib cage .  Denies nausea vomiting or shortness of breath.  Slight dry cough and hurts with breathing.  Denies fall or trauma lately.  She leaned on 1 side last night and felt pain afterwards as pressure but no heaviness or exertional nature to symptoms.   Patient denies any palpitations or dizziness or syncope.  No headache or neck pain or focal weakness.  She has chronic pain.  No URI.  She fell a month ago and injured right ankle has boot in place.  Almost a year ago she had a fall that led to her chronic pain.  No dizziness or otorrhea rhinorrhea or dysphagia slurred speech or blurry vision.  No bleeding from anywhere.  No other joint pains or skin rash at present.  No appetite change or weight loss.  She has left shoulder pain for a month.  May have probably pulled a muscle.  No relationship of food to pain or meals or activity or GERD.  Pain reproducible on palpation.  She has been also dealing with right foot pain for 2 to 3 weeks after a fall and using walker.  She told me that she broke her right foot the time.  Generalized weakness deconditioning and ambulatory dysfunction.  Admitted here for pain management.  Her influenza and COVID test negative.  No worsening leg swelling and BNP not impressive.  EKG sinus rhythm.  No other complaints at this time.  CTA no PE or pneumothorax or pneumonia or edema    Patient was admitted for chronic pain management and further investigation.  On the day of discharge patient remained to have chest pain-free.  She was no longer having that left sided rib cage and left flank pain any longer.  It was felt that likely her pain was " muscle skeletal in nature.  Urinary culture did show E. coli.  Talking with the patient she constantly has urinary cultures that show E. coli that they choose not to treat because she is asymptomatic and she has genitourinary syndrome of menopause for which she uses estradiol cream for.  Patient is afebrile and has no other symptoms.  Patient will not receive any antibiotics for the positive urine culture.  She was noted to have a left renal calculus that is nonobstructive but 7 mm.  Patient will have a follow-up with urology due to the size of the nonobstructive stone.  Patient does have history of coronary disease and congestive heart failure.  It was noted that her creatinine did increase on the day of discharge but this is likely related to her vomiting last night and the Toradol she received in the hospital.  Normal saline to 50 mL bolus was given on the day of discharge.  Patient states that she really would like to discharge to home and that overall she feels much better.  She has not vomited at all throughout today and she would feel much better if she could rest in her own bed.  The flank pain is now completely resolved and her lower abdominal discomfort is resolved as well.  She had a large bowel movement today and overall feels much better.  Patient does have a follow-up with nephrology on January 6 would recommend repeat blood work at that time.  I informed the patient to continue to hold her Lasix until she is eating and drinking back to normal again.    Patient will follow-up with primary care physician, urology and nephrology in the near future.    Pertinent Physical Exam At Time of Discharge  Physical Exam    General Appearance: AAO x 3, not in acute distress  Skin: skin color pink, warm, and dry; no suspicious rashes or lesions  Eyes : PERRL, EOM's intact  ENT: mucous membranes pink and moist  Neck: normocephalic  Respiratory: lungs clear to auscultation anteriorly; no wheezing, rhonchi, or  crackles.   Heart: regular rate and rhythm. telemetry shows sinus rhythm  Abdomen: Nondistended, positive bowel sounds x4, soft,  nontender with today's assessment  Extremities: no edema   Peripheral pulses: normal x4 extremities  Neuro: alert, coherent and conversant, no focal motor deficits    Home Medications     Medication List      CONTINUE taking these medications     aspirin 81 mg EC tablet; Take 1 tablet (81 mg) by mouth once daily.   carvedilol 25 mg tablet; Commonly known as: Coreg; Take 1 tablet (25 mg)   by mouth 2 times a day.   cilostazol 50 mg tablet; Commonly known as: Pletal; Take 1 tablet (50   mg) by mouth 2 times a day.   CRANBERRY ORAL   dicyclomine 10 mg capsule; Commonly known as: Bentyl; Take 1 capsule (10   mg) by mouth 2 times a day as needed (Diarrhea).   estradiol 0.01 % (0.1 mg/gram) vaginal cream; Commonly known as:   Estrace; Insert a pea sized amount into the vagina twice weekly   furosemide 40 mg tablet; Commonly known as: Lasix; Take 1 tablet (40 mg)   by mouth once daily.   ipratropium-albuteroL 0.5-2.5 mg/3 mL nebulizer solution; Commonly known   as: Duo-Neb; Take 3 mL by nebulization every 4 hours if needed for   wheezing.   levothyroxine 100 mcg tablet; Commonly known as: Synthroid, Levoxyl;   TAKE 1 TABLET BY MOUTH ONCE  DAILY   NIFEdipine ER 90 mg 24 hr tablet; Commonly known as: Adalat CC; Take 1   tablet (90 mg) by mouth once daily.   OMEGA 3-6-9 ORAL   oxygen gas therapy; Commonly known as: O2; Inhale 1 each once every 24   hours.   psyllium 3.4 gram packet; Commonly known as: Metamucil   rosuvastatin 20 mg tablet; Commonly known as: Crestor; TAKE 1 TABLET BY   MOUTH ONCE  DAILY   spironolactone 25 mg tablet; Commonly known as: Aldactone; TAKE 1 TABLET   BY MOUTH DAILY   VITAMIN B COMPLEX-FOLIC ACID ORAL   Vitamin D3 5,000 Units tablet; Generic drug: cholecalciferol       Outpatient Follow-Up  Future Appointments   Date Time Provider Department Center   1/8/2025 10:30 AM  Vincent Cain DO [id number] [facility]   2/6/2025 11:15 AM Tr Piña DO [id number] [facility]   4/24/2025 11:30 AM Margaret Hanley MD [id number] [facility]   7/7/2025 10:00 AM Álvaro Sterling MD [id number] [facility]   9/10/2025 10:00 [facility]   9/15/2025  9:30 AM Rhina Chandra MD [id number] [facility]       Anai Daugherty PA-C

## 2024-12-30 NOTE — CARE PLAN
Problem: Skin  Goal: Prevent/manage excess moisture  Outcome: Progressing  Flowsheets (Taken 12/29/2024 2020)  Prevent/manage excess moisture: Moisturize dry skin

## 2024-12-30 NOTE — NURSING NOTE
Discharge Note: 12/30/2024 4327 AVS and pt responsibilities reviewed with pt and copy given. Chest pain, flank pain, education reviewed with pt and information sheets given. Pt verbalizes understanding of instructions received, verbalizes understanding of when to seek medical attention, denies any home going or personal care needs. Denies further questions or concerns. Reviewed follow up appts with pt and verbalizes understanding. Pt denies any pain at present. Ab TEAGUE

## 2024-12-31 ENCOUNTER — PATIENT OUTREACH (OUTPATIENT)
Age: 79
End: 2024-12-31
Payer: MEDICARE

## 2024-12-31 LAB
ATRIAL RATE: 83 BPM
P AXIS: 68 DEGREES
P OFFSET: 203 MS
P ONSET: 140 MS
PR INTERVAL: 156 MS
Q ONSET: 218 MS
QRS COUNT: 13 BEATS
QRS DURATION: 96 MS
QT INTERVAL: 386 MS
QTC CALCULATION(BAZETT): 453 MS
QTC FREDERICIA: 430 MS
R AXIS: 47 DEGREES
T AXIS: 21 DEGREES
T OFFSET: 411 MS
VENTRICULAR RATE: 83 BPM

## 2024-12-31 NOTE — PROGRESS NOTES
Discharge Facility:[facility]  Discharge Diagnosis:left-sided flank and back pain as well pain in rib cage. Admitted here for pain management. It was felt that likely her pain was muscle skeletal in nature.  Admission Date:12.28.24  Discharge Date: 12.30.24    PCP Appointment Date:Declines appt with PCP at this time. Messaged office.    Specialist Appointment Date:   Hospital Encounter and Summary Linked: Yes  See discharge assessment below for further details  Engagement  Call Start Time: 1321 (12/31/2024  1:21 PM)    Medications  Medications reviewed with patient/caregiver?: Yes (12/31/2024  1:21 PM)  Is the patient having any side effects they believe may be caused by any medication additions or changes?: No (12/31/2024  1:21 PM)  Does the patient have all medications ordered at discharge?: Yes (12/31/2024  1:21 PM)  Care Management Interventions: No intervention needed (12/31/2024  1:21 PM)  Prescription Comments: No med changes (12/31/2024  1:21 PM)  Is the patient taking all medications as directed (includes completed medication regime)?: Yes (12/31/2024  1:21 PM)  Care Management Interventions: Provided patient education (12/31/2024  1:21 PM)  Medication Comments: Patient verbalized understanding of discharge medications. (12/31/2024  1:21 PM)    Appointments  Does the patient have a primary care provider?: Yes (12/31/2024  1:21 PM)  Care Management Interventions: Educated patient on importance of making appointment (Declines appt with PCP at this time. Messaged office.) (12/31/2024  1:21 PM)  Has the patient kept scheduled appointments due by today?: Yes (12/31/2024  1:21 PM)  Care Management Interventions: Advised patient to keep appointment (12/31/2024  1:21 PM)    Self Management  What is the home health agency?: n/a (12/31/2024  1:21 PM)  What Durable Medical Equipment (DME) was ordered?: n/a (12/31/2024  1:21 PM)    Patient Teaching  Does the patient have access to their discharge instructions?: Yes  (12/31/2024  1:21 PM)  Care Management Interventions: Reviewed instructions with patient (12/31/2024  1:21 PM)  What is the patient's perception of their health status since discharge?: Improving (12/31/2024  1:21 PM)  Is the patient/caregiver able to teach back the hierarchy of who to call/visit for symptoms/problems? PCP, Specialist, Home Health nurse, Urgent Care, ED, 911: Yes (12/31/2024  1:21 PM)  Patient/Caregiver Education Comments: Spoke with patient. Pain has subsided. No med changes. Declines PCP appt at this time. Messaged office. Will be following with urology for kidney stone. Also following with cardio, Nephr and gastro. (12/31/2024  1:21 PM)

## 2025-01-02 LAB
BACTERIA UR CULT: ABNORMAL
BACTERIA UR CULT: ABNORMAL

## 2025-01-06 ENCOUNTER — LAB (OUTPATIENT)
Dept: LAB | Facility: LAB | Age: 80
End: 2025-01-06
Payer: MEDICARE

## 2025-01-06 DIAGNOSIS — E55.9 VITAMIN D INSUFFICIENCY: ICD-10-CM

## 2025-01-06 DIAGNOSIS — N39.0 URINARY TRACT INFECTION WITHOUT HEMATURIA, SITE UNSPECIFIED: ICD-10-CM

## 2025-01-06 DIAGNOSIS — N18.32 STAGE 3B CHRONIC KIDNEY DISEASE (MULTI): ICD-10-CM

## 2025-01-06 LAB
25(OH)D3 SERPL-MCNC: >120 NG/ML (ref 30–100)
ANION GAP SERPL CALC-SCNC: 18 MMOL/L (ref 10–20)
APPEARANCE UR: ABNORMAL
BACTERIA #/AREA URNS AUTO: ABNORMAL /HPF
BILIRUB UR STRIP.AUTO-MCNC: NEGATIVE MG/DL
BUN SERPL-MCNC: 22 MG/DL (ref 6–23)
CALCIUM SERPL-MCNC: 10.3 MG/DL (ref 8.6–10.3)
CHLORIDE SERPL-SCNC: 105 MMOL/L (ref 98–107)
CO2 SERPL-SCNC: 20 MMOL/L (ref 21–32)
COLOR UR: YELLOW
CREAT SERPL-MCNC: 1.47 MG/DL (ref 0.5–1.05)
EGFRCR SERPLBLD CKD-EPI 2021: 36 ML/MIN/1.73M*2
GLUCOSE SERPL-MCNC: 101 MG/DL (ref 74–99)
GLUCOSE UR STRIP.AUTO-MCNC: NORMAL MG/DL
KETONES UR STRIP.AUTO-MCNC: NEGATIVE MG/DL
LEUKOCYTE ESTERASE UR QL STRIP.AUTO: ABNORMAL
MUCOUS THREADS #/AREA URNS AUTO: ABNORMAL /LPF
NITRITE UR QL STRIP.AUTO: ABNORMAL
PH UR STRIP.AUTO: 5.5 [PH]
POTASSIUM SERPL-SCNC: 4.8 MMOL/L (ref 3.5–5.3)
PROT UR STRIP.AUTO-MCNC: ABNORMAL MG/DL
RBC # UR STRIP.AUTO: NEGATIVE /UL
RBC #/AREA URNS AUTO: ABNORMAL /HPF
SODIUM SERPL-SCNC: 138 MMOL/L (ref 136–145)
SP GR UR STRIP.AUTO: 1.01
SQUAMOUS #/AREA URNS AUTO: ABNORMAL /HPF
UROBILINOGEN UR STRIP.AUTO-MCNC: NORMAL MG/DL
WBC #/AREA URNS AUTO: >50 /HPF
WBC CLUMPS #/AREA URNS AUTO: ABNORMAL /HPF

## 2025-01-06 PROCEDURE — 87181 SC STD AGAR DILUTION PER AGT: CPT

## 2025-01-06 PROCEDURE — 87077 CULTURE AEROBIC IDENTIFY: CPT

## 2025-01-06 PROCEDURE — 82306 VITAMIN D 25 HYDROXY: CPT

## 2025-01-06 PROCEDURE — 82043 UR ALBUMIN QUANTITATIVE: CPT

## 2025-01-06 PROCEDURE — 82570 ASSAY OF URINE CREATININE: CPT

## 2025-01-06 PROCEDURE — 87186 SC STD MICRODIL/AGAR DIL: CPT

## 2025-01-06 PROCEDURE — 81001 URINALYSIS AUTO W/SCOPE: CPT

## 2025-01-06 PROCEDURE — 87086 URINE CULTURE/COLONY COUNT: CPT

## 2025-01-06 PROCEDURE — 80048 BASIC METABOLIC PNL TOTAL CA: CPT

## 2025-01-07 DIAGNOSIS — N39.0 URINARY TRACT INFECTION WITHOUT HEMATURIA, SITE UNSPECIFIED: Primary | ICD-10-CM

## 2025-01-07 LAB
CREAT UR-MCNC: 131.8 MG/DL (ref 20–320)
MICROALBUMIN UR-MCNC: 54.7 MG/L
MICROALBUMIN/CREAT UR: 41.5 UG/MG CREAT

## 2025-01-08 ENCOUNTER — APPOINTMENT (OUTPATIENT)
Dept: NEPHROLOGY | Facility: CLINIC | Age: 80
End: 2025-01-08
Payer: MEDICARE

## 2025-01-08 VITALS
BODY MASS INDEX: 32.85 KG/M2 | SYSTOLIC BLOOD PRESSURE: 134 MMHG | HEART RATE: 70 BPM | WEIGHT: 192.4 LBS | DIASTOLIC BLOOD PRESSURE: 64 MMHG | HEIGHT: 64 IN

## 2025-01-08 DIAGNOSIS — E55.9 VITAMIN D INSUFFICIENCY: ICD-10-CM

## 2025-01-08 DIAGNOSIS — N18.32 STAGE 3B CHRONIC KIDNEY DISEASE (MULTI): Primary | ICD-10-CM

## 2025-01-08 DIAGNOSIS — E78.2 MIXED HYPERLIPIDEMIA: ICD-10-CM

## 2025-01-08 DIAGNOSIS — I10 PRIMARY HYPERTENSION: ICD-10-CM

## 2025-01-08 DIAGNOSIS — G47.33 OSA ON CPAP: ICD-10-CM

## 2025-01-08 DIAGNOSIS — I50.9 CONGESTIVE HEART FAILURE, UNSPECIFIED HF CHRONICITY, UNSPECIFIED HEART FAILURE TYPE: ICD-10-CM

## 2025-01-08 DIAGNOSIS — E87.20 METABOLIC ACIDOSIS: ICD-10-CM

## 2025-01-08 PROCEDURE — 3078F DIAST BP <80 MM HG: CPT | Performed by: INTERNAL MEDICINE

## 2025-01-08 PROCEDURE — 1159F MED LIST DOCD IN RCRD: CPT | Performed by: INTERNAL MEDICINE

## 2025-01-08 PROCEDURE — 1111F DSCHRG MED/CURRENT MED MERGE: CPT | Performed by: INTERNAL MEDICINE

## 2025-01-08 PROCEDURE — 1160F RVW MEDS BY RX/DR IN RCRD: CPT | Performed by: INTERNAL MEDICINE

## 2025-01-08 PROCEDURE — 1036F TOBACCO NON-USER: CPT | Performed by: INTERNAL MEDICINE

## 2025-01-08 PROCEDURE — 99213 OFFICE O/P EST LOW 20 MIN: CPT | Performed by: INTERNAL MEDICINE

## 2025-01-08 PROCEDURE — 3075F SYST BP GE 130 - 139MM HG: CPT | Performed by: INTERNAL MEDICINE

## 2025-01-08 ASSESSMENT — ENCOUNTER SYMPTOMS
ALLERGIC/IMMUNOLOGIC NEGATIVE: 1
ENDOCRINE NEGATIVE: 1
MUSCULOSKELETAL NEGATIVE: 1
NEUROLOGICAL NEGATIVE: 1
PSYCHIATRIC NEGATIVE: 1
HEMATOLOGIC/LYMPHATIC NEGATIVE: 1
GASTROINTESTINAL NEGATIVE: 1
EYES NEGATIVE: 1
RESPIRATORY NEGATIVE: 1
CARDIOVASCULAR NEGATIVE: 1
CONSTITUTIONAL NEGATIVE: 1

## 2025-01-08 NOTE — PROGRESS NOTES
"Subjective   She is feeling pretty well  No swelling  She has a soft cast on her right leg  BP is 134/64  Now taking Super Beets.    Patient ID: Brit Lopez \"Robert" is a 79 y.o. female who presents for Follow-up.  HPI  She is here for follow-up secondary to chronic kidney disease  She was recently admitted to the hospital secondary to back pain  Her urine culture grew E. coli but she was asymptomatic and so not treated  She was found to have a left 7 mm kidney stone and was to follow-up with urology    Labs were drawn on January 6  Her vitamin D level is very elevated greater than 120 metabolic panel shows a bicarb of 20 BUN 22 with a creatinine of 1.47 estimated GFR is 36 this looks pretty good for her  Urinalysis shows nitrate and leukocyte esterase urine albumin creatinine ratio is 41 very stable  Urine micro is greater than 50  Medications are reviewed she is currently on aspirin carvedilol cranberry Lasix levothyroxine nifedipine statin spironolactone  Review of Systems   Constitutional: Negative.    HENT: Negative.     Eyes: Negative.    Respiratory: Negative.     Cardiovascular: Negative.    Gastrointestinal: Negative.    Endocrine: Negative.    Genitourinary: Negative.    Musculoskeletal: Negative.    Skin: Negative.    Allergic/Immunologic: Negative.    Neurological: Negative.    Hematological: Negative.    Psychiatric/Behavioral: Negative.         Objective   Physical Exam  Constitutional:       Appearance: Normal appearance.   HENT:      Head: Normocephalic and atraumatic.      Right Ear: External ear normal.      Left Ear: External ear normal.      Nose: Nose normal.      Mouth/Throat:      Mouth: Mucous membranes are moist.      Pharynx: Oropharynx is clear.   Eyes:      Extraocular Movements: Extraocular movements intact.      Conjunctiva/sclera: Conjunctivae normal.      Pupils: Pupils are equal, round, and reactive to light.   Cardiovascular:      Rate and Rhythm: Normal rate and regular " rhythm.   Pulmonary:      Effort: Pulmonary effort is normal.      Breath sounds: Normal breath sounds.   Abdominal:      General: Abdomen is flat.      Palpations: Abdomen is soft.   Musculoskeletal:         General: No swelling.      Comments: Soft cast on her right arm.     Skin:     General: Skin is warm and dry.   Neurological:      General: No focal deficit present.      Mental Status: She is alert and oriented to person, place, and time.   Psychiatric:         Mood and Affect: Mood normal.         Behavior: Behavior normal.         Assessment/Plan   Problem List Items Addressed This Visit             ICD-10-CM    Mixed hyperlipidemia E78.2    Hypertension I10    Metabolic acidosis E87.20    KASSI on CPAP G47.33    Chronic kidney disease (CKD), stage III (moderate) (Multi) - Primary N18.30    Relevant Orders    Follow Up In Nephrology    Basic metabolic panel    Albumin-Creatinine Ratio, Urine Random    Urinalysis with Reflex Microscopic    Congestive heart failure, unspecified HF chronicity, unspecified heart failure type I50.9   Plan:   Renal function is stable.  Bp is good  Volume status is good  No symptoms with urination.  Following up with urology  Follow up in 3 months with labs.    Chronic kidney disease Stage IIIb/IV: With baseline creatinine 1.5-1.8  Hypertension  Normocytic anemia   Obstructive sleep apnea with use of CPAP  Coronary artery disease with 3 stents placed in past  Hypothyroidism  Hyperlipidemia  Peripheral arterial disease  Obesity  Vitamin D Deficiency  R Renal Atrophy with GFR of 18% and Nephrectomy on 2/1/2019  Diastolic congestive heart failure: Acute on chronic  Moderate aortic stenosis  UTI with greater than 100,000 gram-negative bacilli:  Asymptomatic and likely Colonized.  E.Coli       Vincent Cain DO 01/08/25 10:32 AM

## 2025-01-11 LAB
BACTERIA UR CULT: ABNORMAL
BACTERIA UR CULT: ABNORMAL

## 2025-01-13 ENCOUNTER — HOSPITAL ENCOUNTER (OUTPATIENT)
Dept: RADIOLOGY | Facility: CLINIC | Age: 80
Discharge: HOME | End: 2025-01-13
Payer: MEDICARE

## 2025-01-13 DIAGNOSIS — S92.354A NONDISPLACED FRACTURE OF FIFTH METATARSAL BONE, RIGHT FOOT, INITIAL ENCOUNTER FOR CLOSED FRACTURE: ICD-10-CM

## 2025-01-13 PROCEDURE — 73630 X-RAY EXAM OF FOOT: CPT | Mod: RT

## 2025-01-13 PROCEDURE — 73630 X-RAY EXAM OF FOOT: CPT | Mod: RIGHT SIDE | Performed by: RADIOLOGY

## 2025-01-15 ENCOUNTER — APPOINTMENT (OUTPATIENT)
Age: 80
End: 2025-01-15
Payer: MEDICARE

## 2025-01-21 ENCOUNTER — TELEPHONE (OUTPATIENT)
Dept: NEPHROLOGY | Facility: CLINIC | Age: 80
End: 2025-01-21
Payer: MEDICARE

## 2025-01-21 NOTE — PROGRESS NOTES
Pt called requesting to speak with you directly. She states that she is going to be scheduled for tests that involves dye that could potentially affect her kidneys. She states this is a test that is checking 2 heart valves. She states that she is being seen by Dr. Domingo at [facility] for future test.

## 2025-01-22 DIAGNOSIS — I10 PRIMARY HYPERTENSION: ICD-10-CM

## 2025-01-23 RX ORDER — SPIRONOLACTONE 25 MG/1
25 TABLET ORAL DAILY
Qty: 90 TABLET | Refills: 3 | Status: SHIPPED | OUTPATIENT
Start: 2025-01-23

## 2025-01-31 ENCOUNTER — PATIENT OUTREACH (OUTPATIENT)
Age: 80
End: 2025-01-31
Payer: MEDICARE

## 2025-01-31 NOTE — PROGRESS NOTES
Discharge Facility:[facility]   Discharge Diagnosis:Severe aortic stenosis  Admission Date:1/28/25  Discharge Date: 1/30/25    PCP Appointment Date:No contact made. Message sent to office  Specialist Appointment Date: TBD  Hospital Encounter and Summary Linked: Yes    2 call attempts made

## 2025-02-06 ENCOUNTER — APPOINTMENT (OUTPATIENT)
Dept: GASTROENTEROLOGY | Facility: CLINIC | Age: 80
End: 2025-02-06
Payer: MEDICARE

## 2025-02-06 VITALS — WEIGHT: 192 LBS | BODY MASS INDEX: 32.78 KG/M2 | HEIGHT: 64 IN

## 2025-02-06 DIAGNOSIS — M62.89 PELVIC FLOOR DYSFUNCTION IN FEMALE: Primary | ICD-10-CM

## 2025-02-06 PROCEDURE — 1036F TOBACCO NON-USER: CPT | Performed by: INTERNAL MEDICINE

## 2025-02-06 PROCEDURE — 99213 OFFICE O/P EST LOW 20 MIN: CPT | Performed by: INTERNAL MEDICINE

## 2025-02-06 PROCEDURE — 1159F MED LIST DOCD IN RCRD: CPT | Performed by: INTERNAL MEDICINE

## 2025-02-06 RX ORDER — NIFEDIPINE 30 MG/1
30 TABLET, FILM COATED, EXTENDED RELEASE ORAL
COMMUNITY

## 2025-02-06 ASSESSMENT — ENCOUNTER SYMPTOMS
RESPIRATORY NEGATIVE: 1
CONSTITUTIONAL NEGATIVE: 1
NEUROLOGICAL NEGATIVE: 1
ENDOCRINE NEGATIVE: 1
CARDIOVASCULAR NEGATIVE: 1
EYES NEGATIVE: 1
HEMATOLOGIC/LYMPHATIC NEGATIVE: 1

## 2025-02-06 NOTE — PROGRESS NOTES
"Subjective   Patient ID: Brit Lopez \"Valorie\" is a 79 y.o. female who presents for Follow-up (3-4 mo fuv. Taking Metamucil BID. Doing okay with her constipation by taking the dicyclomine and metamucil. ).    SARAH Martinez is seen today in follow-up since last visit was hospitalized has critical aortic stenosis is undergoing evaluation for transaortic valve replacement in the process of workup at CT abdomen was performed feeling a hypoattenuated lesion in her left kidney given her solitary kidney she has workup planned with urology later this week.  There is a pleural-based density in her left lung base dating back to June of this year for recurrent pneumonia PET scan is planned for the 12th oncology follow-up afterwards.  She continues to complain of shortness of breath and exertional fatigue likely related to her lung process and aortic valve issue.    From a GI point of view states that the medication and fiber of help she is having no diarrhea or constipation at this time did have episode of diarrhea following hospitalization from all the contrast she had to drink for CAT scans which is now resolved since she has returned home.    Review of Systems   Constitutional: Negative.    HENT: Negative.     Eyes: Negative.    Respiratory: Negative.     Cardiovascular: Negative.    Endocrine: Negative.    Genitourinary: Negative.    Neurological: Negative.    Hematological: Negative.        Objective   Physical Exam  Vitals and nursing note reviewed.   Constitutional:       Appearance: Normal appearance.   HENT:      Head: Normocephalic.      Mouth/Throat:      Mouth: Mucous membranes are moist.      Pharynx: Oropharynx is clear.   Eyes:      Conjunctiva/sclera: Conjunctivae normal.      Pupils: Pupils are equal, round, and reactive to light.   Cardiovascular:      Rate and Rhythm: Normal rate and regular rhythm.      Comments: Aortic ejection murmur  Pulmonary:      Effort: Pulmonary effort is normal.      Breath sounds: " Normal breath sounds.   Abdominal:      General: Abdomen is flat. Bowel sounds are normal.      Palpations: Abdomen is soft.   Musculoskeletal:      Cervical back: Normal range of motion and neck supple.   Skin:     General: Skin is warm and dry.   Neurological:      General: No focal deficit present.      Mental Status: She is alert and oriented to person, place, and time.   Psychiatric:         Behavior: Behavior normal.         Assessment/Plan   Diagnoses and all orders for this visit:  Pelvic floor dysfunction in female       Continue present medical therapy follow-up as needed or in 1 year for medication refill    Tr Piña DO 02/06/25 11:19 AM

## 2025-02-06 NOTE — PROGRESS NOTES
"Subjective   Patient ID: Brit Lopez \"Valorie\" is a 79 y.o. female    HPI  79 y.o. female who presents to establish for renal stone. CT on 12/28/2024 shows a 7 mm nonobstructive left renal calculus. There subcentimeter hypoattenuating lesions located within the left kidney which are too small to characterize by size criteria.  Right kidney is not visualized and presumed to be surgically absent.     The most recent UA, conducted on 01/28/2025, revealed:  Leukocyte Esterase, Urine  Large Abnormal      The most recent Urine Culture, conducted on 01/06/2025, revealed:  >=100,000 CFU/mL Escherichia coli Abnormal   >=100,000 CFU/mL Aerococcus urinae Abnormal     The most recent CT abdomen pelvis wo IV contrast, conducted on 12/28/2024, revealed:  1.  Nonvisualization the appendix.  2.  Nonobstructive subcentimeter left renal calculus.  No  hydronephrosis or ureteral calculi.  3.  No intra-abdominal/pelvic fluid collections or pneumoperitoneum.  4.  Small left pleural effusion.  There is a left consolidative  disease noted which may represent atelectasis or pneumonitis. There  is mild right lower lobe atelectasis.  5.  Other findings as stated above.      Review of Systems    All systems were reviewed. Anything negative was noted in the HPI.    Objective   Physical Exam    General: Well developed, well nourished, alert and cooperative, appears in no acute distress   Eyes: Non-injected conjunctiva, sclera clear, no proptosis   Cardiac: Extremities are warm and well perfused. No edema, cyanosis or pallor   Lungs: Breathing is easy, non-labored. Speaking in clear and complete sentences. Normal diaphragmatic movement   MSK: Ambulatory with steady gait, unassisted   Neuro: Alert and oriented to person, place, and time   Psych: Demonstrates good judgment and reason, without hallucinations, abnormal affect or abnormal behaviors   Skin: No obvious lesions, no rashes       No CVA tenderness bilaterally   No suprapubic pain or " discomfort       Past Medical History:   Diagnosis Date    Old myocardial infarction 04/06/2018    History of myocardial infarction    Personal history of malignant neoplasm of breast     History of malignant neoplasm of breast    Personal history of other diseases of the circulatory system 04/06/2018    History of hypertension    Personal history of other diseases of the female genital tract 04/06/2018    History of uterine prolapse    Personal history of other endocrine, nutritional and metabolic disease 04/06/2018    History of hyperlipidemia    Personal history of other medical treatment     H/O mammogram         Past Surgical History:   Procedure Laterality Date    APPENDECTOMY  04/06/2018    Appendectomy    BI MAMMO BILATERAL SCREENING  08/30/2023    cat 2    BI MAMMO BILATERAL SCREENING Bilateral 09/03/2024    CAT 2    CATARACT EXTRACTION Bilateral 02/2024    COLONOSCOPY  05/16/2018    REPEAT 10YRS/DR MELGAR    INCISIONAL BREAST BIOPSY  04/06/2018    Incisional Breast Biopsy    OTHER SURGICAL HISTORY  04/06/2018    Breast Surgery Revision Of Reconstructed Right Breast    OTHER SURGICAL HISTORY  09/13/2021    Hip replacement    OTHER SURGICAL HISTORY  12/13/2019    Tubal ligation    OTHER SURGICAL HISTORY  12/13/2019    Kidney surgery    OTHER SURGICAL HISTORY  12/13/2019    Hysterectomy    OTHER SURGICAL HISTORY  12/13/2019    Cardiac catheterization with stent placement    OTHER SURGICAL HISTORY  12/13/2019    Cardiac catheterization    OTHER SURGICAL HISTORY  12/13/2019    Myringotomy with tube placement    OTHER SURGICAL HISTORY  06/11/2009    Right mastectomy with SLNB     ER/DC +  HER -2         Assessment/Plan   Small non obstructive silent Renal Stone 5-6mm in almost uninephric kidney, pleural mass, Aortic stenosis, Planning TAVR, 1.4cm small renal cyst/lesion to be observed for now     79 y.o. female who presents for the above condition, We had a very long and extensive discussion with  the patient regarding his condition.  I discussed with the patient the pathophysiology, differential diagnosis, risk factor, management of renal  stones. Explained to the patient that the stone is most probably present but does not cause pain or renal dysfunction given the recent CT. I gave the patient 3 options of management including observation which I highly encouraged given the size of the location of the stone.  Explained that they have likely chance of spontaneous passage of the stone.  We also discussed ESWL which would be appropriate for the size of the location of stone. We discussed at length a left ureteroscopy, laser stone fragmentation, left retrograde pyelogram, left double-J stent insertion. We discussed in detail the risk, benefit, potential complication, adverse events including hematuria, pneumaturia, pain, stent discomfort and pain, fever, chills, infection, urosepsis, I explained to the patient that most likely they need a second procedure at the first wound will be probably only a stent placement. I explained that the second procedure would be the actual laser stone fragmentation and exchange of their stent. Patient presents and elect to proceed with observation.      Plan:  - Renal US  - Follow-up in 6 months    E&M visit today is associated with current or anticipated ongoing medical care services related to a patient's single, serious condition or a complex condition.     2/10/2025    Scribe Attestation  By signing my name below, I, Serjio Mtz attest that this documentation has been prepared under the direction and in the presence of Dr. Ra Noyola.

## 2025-02-10 ENCOUNTER — APPOINTMENT (OUTPATIENT)
Dept: UROLOGY | Facility: CLINIC | Age: 80
End: 2025-02-10
Payer: MEDICARE

## 2025-02-10 VITALS
HEART RATE: 86 BPM | SYSTOLIC BLOOD PRESSURE: 144 MMHG | HEIGHT: 64 IN | WEIGHT: 183 LBS | BODY MASS INDEX: 31.24 KG/M2 | DIASTOLIC BLOOD PRESSURE: 80 MMHG

## 2025-02-10 DIAGNOSIS — N20.0 RENAL STONES: Primary | ICD-10-CM

## 2025-02-10 PROCEDURE — 99204 OFFICE O/P NEW MOD 45 MIN: CPT | Performed by: STUDENT IN AN ORGANIZED HEALTH CARE EDUCATION/TRAINING PROGRAM

## 2025-02-10 PROCEDURE — 3077F SYST BP >= 140 MM HG: CPT | Performed by: STUDENT IN AN ORGANIZED HEALTH CARE EDUCATION/TRAINING PROGRAM

## 2025-02-10 PROCEDURE — 1036F TOBACCO NON-USER: CPT | Performed by: STUDENT IN AN ORGANIZED HEALTH CARE EDUCATION/TRAINING PROGRAM

## 2025-02-10 PROCEDURE — 3079F DIAST BP 80-89 MM HG: CPT | Performed by: STUDENT IN AN ORGANIZED HEALTH CARE EDUCATION/TRAINING PROGRAM

## 2025-02-10 PROCEDURE — G2211 COMPLEX E/M VISIT ADD ON: HCPCS | Performed by: STUDENT IN AN ORGANIZED HEALTH CARE EDUCATION/TRAINING PROGRAM

## 2025-02-10 ASSESSMENT — PATIENT HEALTH QUESTIONNAIRE - PHQ9
1. LITTLE INTEREST OR PLEASURE IN DOING THINGS: NOT AT ALL
2. FEELING DOWN, DEPRESSED OR HOPELESS: NOT AT ALL
SUM OF ALL RESPONSES TO PHQ9 QUESTIONS 1 AND 2: 0

## 2025-02-10 ASSESSMENT — ENCOUNTER SYMPTOMS
LOSS OF SENSATION IN FEET: 0
OCCASIONAL FEELINGS OF UNSTEADINESS: 0
DEPRESSION: 0

## 2025-02-13 ENCOUNTER — PATIENT OUTREACH (OUTPATIENT)
Age: 80
End: 2025-02-13
Payer: MEDICARE

## 2025-02-20 DIAGNOSIS — I73.9 PVD (PERIPHERAL VASCULAR DISEASE) (CMS-HCC): ICD-10-CM

## 2025-02-20 RX ORDER — CILOSTAZOL 50 MG/1
50 TABLET ORAL 2 TIMES DAILY
Qty: 180 TABLET | Refills: 3 | Status: SHIPPED | OUTPATIENT
Start: 2025-02-20 | End: 2026-02-20

## 2025-04-04 ENCOUNTER — PATIENT OUTREACH (OUTPATIENT)
Age: 80
End: 2025-04-04
Payer: MEDICARE

## 2025-04-04 NOTE — PROGRESS NOTES
Outreach made for post discharge follow up. At time of call, the patient stated she is recovering well at home. Denied chest pain. Stated she is hoping her SOB with exertion will be improved post-procedure. Patient has been taking it easy since she has been home and not exerting herself. Patient has the Plavix prescribed at discharge. Reviewed upcoming appt schedule. Patient declined to schedule a primary care hospital follow up as she has to travel twice to [place] for upcoming appts. Advised patient to call Dr. Ware's office directly with any future needs.     Discharge Facility: [facility]  Discharge Diagnosis: Severe aortic stenosis S/P TAVR (transcatheter aortic valve replacement)  Admission Date: 4/1/2025  Discharge Date: 4/2/2025    PCP Appointment Date: Declined to schedule  Specialist Appointment Date: Appointment with Vincent Cain DO (04/07/2025) Nephrology,  4/14/2025 11:00 AM Dr. Yusra Robles, Cardiology  Hospital Encounter and Summary Linked: Yes  Hospital Encounter with [employer]; Loy Frazier MD; Estuardo Cleaning MD

## 2025-04-06 LAB
25(OH)D3+25(OH)D2 SERPL-MCNC: 51 NG/ML (ref 30–100)
ALBUMIN/CREAT UR: 28 MG/G CREAT
ANION GAP SERPL CALCULATED.4IONS-SCNC: 11 MMOL/L (CALC) (ref 7–17)
APPEARANCE UR: CLEAR
BACTERIA #/AREA URNS HPF: ABNORMAL /HPF
BILIRUB UR QL STRIP: NEGATIVE
BUN SERPL-MCNC: 22 MG/DL (ref 7–25)
BUN/CREAT SERPL: 15 (CALC) (ref 6–22)
CALCIUM SERPL-MCNC: 9.6 MG/DL (ref 8.6–10.4)
CHLORIDE SERPL-SCNC: 103 MMOL/L (ref 98–110)
CO2 SERPL-SCNC: 25 MMOL/L (ref 20–32)
COLOR UR: YELLOW
CREAT SERPL-MCNC: 1.42 MG/DL (ref 0.6–0.95)
CREAT UR-MCNC: 79 MG/DL (ref 20–275)
EGFRCR SERPLBLD CKD-EPI 2021: 37 ML/MIN/1.73M2
GLUCOSE SERPL-MCNC: 134 MG/DL (ref 65–99)
GLUCOSE UR QL STRIP: NEGATIVE
HGB UR QL STRIP: NEGATIVE
HYALINE CASTS #/AREA URNS LPF: ABNORMAL /LPF
KETONES UR QL STRIP: NEGATIVE
LEUKOCYTE ESTERASE UR QL STRIP: ABNORMAL
MICROALBUMIN UR-MCNC: 2.2 MG/DL
NITRITE UR QL STRIP: NEGATIVE
PH UR STRIP: 6.5 [PH] (ref 5–8)
POTASSIUM SERPL-SCNC: 4.2 MMOL/L (ref 3.5–5.3)
PROT UR QL STRIP: NEGATIVE
RBC #/AREA URNS HPF: ABNORMAL /HPF
SERVICE CMNT-IMP: ABNORMAL
SODIUM SERPL-SCNC: 139 MMOL/L (ref 135–146)
SP GR UR STRIP: 1.01 (ref 1–1.03)
SQUAMOUS #/AREA URNS HPF: ABNORMAL /HPF
WBC #/AREA URNS HPF: ABNORMAL /HPF

## 2025-04-07 ENCOUNTER — APPOINTMENT (OUTPATIENT)
Dept: NEPHROLOGY | Facility: CLINIC | Age: 80
End: 2025-04-07
Payer: MEDICARE

## 2025-04-07 VITALS
BODY MASS INDEX: 31.58 KG/M2 | HEIGHT: 64 IN | SYSTOLIC BLOOD PRESSURE: 142 MMHG | DIASTOLIC BLOOD PRESSURE: 66 MMHG | HEART RATE: 66 BPM | WEIGHT: 185 LBS

## 2025-04-07 DIAGNOSIS — N18.32 STAGE 3B CHRONIC KIDNEY DISEASE (MULTI): Primary | ICD-10-CM

## 2025-04-07 DIAGNOSIS — E78.2 MIXED HYPERLIPIDEMIA: ICD-10-CM

## 2025-04-07 DIAGNOSIS — I10 PRIMARY HYPERTENSION: ICD-10-CM

## 2025-04-07 DIAGNOSIS — I50.9 CONGESTIVE HEART FAILURE, UNSPECIFIED HF CHRONICITY, UNSPECIFIED HEART FAILURE TYPE: ICD-10-CM

## 2025-04-07 DIAGNOSIS — G47.33 OSA ON CPAP: ICD-10-CM

## 2025-04-07 PROCEDURE — 1159F MED LIST DOCD IN RCRD: CPT | Performed by: INTERNAL MEDICINE

## 2025-04-07 PROCEDURE — 3077F SYST BP >= 140 MM HG: CPT | Performed by: INTERNAL MEDICINE

## 2025-04-07 PROCEDURE — 1160F RVW MEDS BY RX/DR IN RCRD: CPT | Performed by: INTERNAL MEDICINE

## 2025-04-07 PROCEDURE — 99213 OFFICE O/P EST LOW 20 MIN: CPT | Performed by: INTERNAL MEDICINE

## 2025-04-07 PROCEDURE — 3078F DIAST BP <80 MM HG: CPT | Performed by: INTERNAL MEDICINE

## 2025-04-07 PROCEDURE — 1036F TOBACCO NON-USER: CPT | Performed by: INTERNAL MEDICINE

## 2025-04-07 RX ORDER — CLOPIDOGREL BISULFATE 75 MG/1
75 TABLET ORAL
COMMUNITY
Start: 2025-04-02 | End: 2025-07-01

## 2025-04-07 ASSESSMENT — ENCOUNTER SYMPTOMS
MUSCULOSKELETAL NEGATIVE: 1
EYES NEGATIVE: 1
ENDOCRINE NEGATIVE: 1
NEUROLOGICAL NEGATIVE: 1
CARDIOVASCULAR NEGATIVE: 1
ALLERGIC/IMMUNOLOGIC NEGATIVE: 1
CONSTITUTIONAL NEGATIVE: 1
GASTROINTESTINAL NEGATIVE: 1
RESPIRATORY NEGATIVE: 1
PSYCHIATRIC NEGATIVE: 1
HEMATOLOGIC/LYMPHATIC NEGATIVE: 1

## 2025-04-07 NOTE — PROGRESS NOTES
"Subjective   She had a Valve replacement at [place]  Has bruesing on left chest.    Patient ID: Brit Lopez \"Valorie\" is a 79 y.o. female who presents for Follow-up (3 months/Review labs ).  HPI  She is here for follow-up secondary to chronic kidney disease with multiple medical comorbidities.  Renal function has been stable around 1.5-1.8.  Labs were completed on April 4  Metabolic panel shows a glucose of 134 BUN 22 with a creatinine of 1.42 estimated GFR is 37 electrolytes look normal  Albumin creatinine ratio is less than 30 vitamin D is normal at 51 urinalysis looks pretty bland blood pressure here in the office was 142/66 Meds are reviewed she is on aspirin Coreg Plavix Lasix statin spironolactone  Review of Systems   Constitutional: Negative.    HENT: Negative.     Eyes: Negative.    Respiratory: Negative.     Cardiovascular: Negative.    Gastrointestinal: Negative.    Endocrine: Negative.    Genitourinary: Negative.    Musculoskeletal: Negative.    Skin: Negative.    Allergic/Immunologic: Negative.    Neurological: Negative.    Hematological: Negative.    Psychiatric/Behavioral: Negative.         Objective   Physical Exam  Constitutional:       Appearance: Normal appearance. She is obese.   HENT:      Head: Normocephalic and atraumatic.      Right Ear: External ear normal.      Left Ear: External ear normal.      Nose: Nose normal.      Mouth/Throat:      Mouth: Mucous membranes are moist.      Pharynx: Oropharynx is clear.   Eyes:      Extraocular Movements: Extraocular movements intact.      Conjunctiva/sclera: Conjunctivae normal.      Pupils: Pupils are equal, round, and reactive to light.   Cardiovascular:      Rate and Rhythm: Normal rate and regular rhythm.   Pulmonary:      Effort: Pulmonary effort is normal.      Breath sounds: Normal breath sounds.   Abdominal:      General: Abdomen is flat.      Palpations: Abdomen is soft.   Skin:     General: Skin is warm and dry.   Neurological:      General: " No focal deficit present.      Mental Status: She is alert and oriented to person, place, and time.   Psychiatric:         Mood and Affect: Mood normal.         Behavior: Behavior normal.         Assessment/Plan   Problem List Items Addressed This Visit             ICD-10-CM    Mixed hyperlipidemia E78.2    Hypertension I10    KASSI on CPAP G47.33    Chronic kidney disease (CKD), stage III (moderate) (Multi) - Primary N18.30    Relevant Orders    Follow Up In Nephrology    Basic metabolic panel    Congestive heart failure, unspecified HF chronicity, unspecified heart failure type I50.9    Relevant Medications    clopidogrel (Plavix) 75 mg tablet   Plan:   Renal function is very stable.    BP is stable.  Following with Cardiology  Urine looks good  Continue as seh is on.  Not on SGLT2 with Recurrent UTI  F/U in 6 months.    Chronic kidney disease Stage IIIb/IV: With baseline creatinine 1.5-1.8  Hypertension  Normocytic anemia   Obstructive sleep apnea with use of CPAP  Coronary artery disease with 3 stents placed in past  Hypothyroidism  Hyperlipidemia  Peripheral arterial disease  Obesity  Vitamin D Deficiency  R Renal Atrophy with GFR of 18% and Nephrectomy on 2/1/2019  Diastolic congestive heart failure: Acute on chronic  Moderate aortic stenosis S/Pvalve Repair March 2025       Vincent Cain DO 04/07/25 9:55 AM

## 2025-04-22 NOTE — PROGRESS NOTES
HISTORY OF PRESENT ILLNESS:  Brit Lopez is a 79 y.o. female, who presents in follow up for FI, Clark     During last encounter on 4/23/24, reviewed and agreed to the following:  Brit Lopez is a 78 y.o. who presents in follow up for FI, Clark     FI  - long discussion today regarding options  - discussed cannot know about safety of supplement she is taking given not regulated by FDA  - discussed other options: imodium (OTC) , cholestipol (prescription), SNM  - wants to continue supplement for now, will discus further at next visit if she wants to try any other treatment options     Clark  - continue using vaginal estrogen  - no episodes of symptomatic UTI in past year  - requested urine sample today and wants call regardless of results    Today she reports   Diarrhea has improved with medication recommended by Dr. Piña.     Had a new aortic valve put in, prior to that had CT of her body and found findings on kidney and lung, had follow up which was negative.    Had antibiotics prior to valve to avoid any UTI. Procedure went well. Had 2 week check up.    Prolapse feels worse, had pneumonia in June, lots of coughing.     The following were reviewed to gain additional history:  External notes: Dr. Cain nephrology note 4/7/25, HLD, HTN, KASSI on CPAP, stage III CKD, CHF  Test results:   Lab Results   Component Value Date    URINECULTURE >=100,000 CFU/mL Escherichia coli (A) 01/06/2025    URINECULTURE >=100,000 CFU/mL Aerococcus urinae (A) 01/06/2025    URINECULTURE >=100,000 CFU/mL Escherichia coli (A) 12/28/2024    URINECULTURE >=100,000 CFU/mL Aerococcus urinae (A) 12/28/2024    URINECULTURE >100,000 Escherichia coli (A) 09/30/2024               PHYSICAL EXAMINATION:  No LMP recorded. Patient is postmenopausal.  There is no height or weight on file to calculate BMI.  There were no vitals taken for this visit.    General Appearance: well appearing  Neuro: Alert and oriented   HEENT: mucous membranes moist,  neck supple  Resp: No respiratory distress, normal work of breathing  MSK: normal range of motion, gait appropriate    Pelvic: deferred    IMPRESSION AND PLAN:  Brit Lopez is a 79 y.o. who presents in follow up for FI, Clark    FI  - improved with bentyl rx'd by Dr. Piña  - continue follow up with him    Clark  - had rx'd vaginal estrogen  - reviewed definition of UTI, she has never had symptoms  - discussed ok to discontinue vaginal estrogen given lack of true UTIs      RTC as needed    All questions and concerns were answered and addressed.  The patient expressed understanding and agrees with the plan.     Margaret Hanley MD

## 2025-04-23 ENCOUNTER — APPOINTMENT (OUTPATIENT)
Dept: OBSTETRICS AND GYNECOLOGY | Facility: CLINIC | Age: 80
End: 2025-04-23
Payer: MEDICARE

## 2025-04-24 ENCOUNTER — OFFICE VISIT (OUTPATIENT)
Dept: OBSTETRICS AND GYNECOLOGY | Facility: CLINIC | Age: 80
End: 2025-04-24
Payer: MEDICARE

## 2025-04-24 VITALS
WEIGHT: 183.8 LBS | SYSTOLIC BLOOD PRESSURE: 154 MMHG | HEART RATE: 70 BPM | BODY MASS INDEX: 31.55 KG/M2 | RESPIRATION RATE: 18 BRPM | DIASTOLIC BLOOD PRESSURE: 69 MMHG | OXYGEN SATURATION: 98 %

## 2025-04-24 DIAGNOSIS — N39.0 RECURRENT UTI: Primary | ICD-10-CM

## 2025-04-24 PROCEDURE — 99214 OFFICE O/P EST MOD 30 MIN: CPT | Performed by: STUDENT IN AN ORGANIZED HEALTH CARE EDUCATION/TRAINING PROGRAM

## 2025-04-24 PROCEDURE — 3078F DIAST BP <80 MM HG: CPT | Performed by: STUDENT IN AN ORGANIZED HEALTH CARE EDUCATION/TRAINING PROGRAM

## 2025-04-24 PROCEDURE — 1126F AMNT PAIN NOTED NONE PRSNT: CPT | Performed by: STUDENT IN AN ORGANIZED HEALTH CARE EDUCATION/TRAINING PROGRAM

## 2025-04-24 PROCEDURE — 3077F SYST BP >= 140 MM HG: CPT | Performed by: STUDENT IN AN ORGANIZED HEALTH CARE EDUCATION/TRAINING PROGRAM

## 2025-04-24 PROCEDURE — 1159F MED LIST DOCD IN RCRD: CPT | Performed by: STUDENT IN AN ORGANIZED HEALTH CARE EDUCATION/TRAINING PROGRAM

## 2025-04-24 RX ORDER — NIFEDIPINE 30 MG/1
30 TABLET, FILM COATED, EXTENDED RELEASE ORAL
COMMUNITY

## 2025-04-24 ASSESSMENT — PAIN SCALES - GENERAL: PAINLEVEL_OUTOF10: 0-NO PAIN

## 2025-05-08 ENCOUNTER — OFFICE VISIT (OUTPATIENT)
Age: 80
End: 2025-05-08
Payer: MEDICARE

## 2025-05-08 VITALS
OXYGEN SATURATION: 96 % | DIASTOLIC BLOOD PRESSURE: 60 MMHG | BODY MASS INDEX: 30.55 KG/M2 | WEIGHT: 178 LBS | HEART RATE: 60 BPM | SYSTOLIC BLOOD PRESSURE: 122 MMHG

## 2025-05-08 DIAGNOSIS — J40 SINOBRONCHITIS: Primary | ICD-10-CM

## 2025-05-08 DIAGNOSIS — I50.43 CHF (CONGESTIVE HEART FAILURE), NYHA CLASS I, ACUTE ON CHRONIC, COMBINED: ICD-10-CM

## 2025-05-08 DIAGNOSIS — I48.91 ATRIAL FIBRILLATION, UNSPECIFIED TYPE (MULTI): ICD-10-CM

## 2025-05-08 DIAGNOSIS — J32.9 SINOBRONCHITIS: Primary | ICD-10-CM

## 2025-05-08 DIAGNOSIS — N18.32 CKD STAGE 3B, GFR 30-44 ML/MIN (MULTI): ICD-10-CM

## 2025-05-08 PROBLEM — J81.0 ACUTE PULMONARY EDEMA: Status: RESOLVED | Noted: 2024-08-10 | Resolved: 2025-05-08

## 2025-05-08 PROBLEM — J96.01 ACUTE HYPOXIC RESPIRATORY FAILURE: Status: RESOLVED | Noted: 2024-08-09 | Resolved: 2025-05-08

## 2025-05-08 PROBLEM — E66.01 OBESITY, MORBID (MULTI): Status: RESOLVED | Noted: 2024-02-26 | Resolved: 2025-05-08

## 2025-05-08 PROCEDURE — 1036F TOBACCO NON-USER: CPT | Performed by: FAMILY MEDICINE

## 2025-05-08 PROCEDURE — 3074F SYST BP LT 130 MM HG: CPT | Performed by: FAMILY MEDICINE

## 2025-05-08 PROCEDURE — 3078F DIAST BP <80 MM HG: CPT | Performed by: FAMILY MEDICINE

## 2025-05-08 PROCEDURE — 1160F RVW MEDS BY RX/DR IN RCRD: CPT | Performed by: FAMILY MEDICINE

## 2025-05-08 PROCEDURE — 99213 OFFICE O/P EST LOW 20 MIN: CPT | Performed by: FAMILY MEDICINE

## 2025-05-08 PROCEDURE — 1159F MED LIST DOCD IN RCRD: CPT | Performed by: FAMILY MEDICINE

## 2025-05-08 RX ORDER — DOXYCYCLINE 100 MG/1
100 CAPSULE ORAL 2 TIMES DAILY
Qty: 14 CAPSULE | Refills: 0 | Status: SHIPPED | OUTPATIENT
Start: 2025-05-08 | End: 2025-05-15

## 2025-05-08 RX ORDER — PREDNISONE 10 MG/1
40 TABLET ORAL DAILY
Qty: 20 TABLET | Refills: 0 | Status: SHIPPED | OUTPATIENT
Start: 2025-05-08 | End: 2025-05-13

## 2025-05-08 NOTE — PROGRESS NOTES
Subjective   Patient ID: Valorie Lopez is a 79 y.o. female who presents for Cough and Nasal Congestion.    HPI   Onset Sunday 4d with cough and runny nnose.  Jne 24 pneumonia.  Using nebs and productivne yellow, runny nose. Some fever chills, broke yesterday.  No dyspnea at rest or walking in.  O2 >94%  HCC for CHF A-fib and CKD.  CKD 4 is now 3B  Review of Systems  As per HPI  Objective   /60   Pulse 60   Wt 80.7 kg (178 lb)   SpO2 96%   BMI 30.55 kg/m²     Physical Exam  Copious nasal secretions no adenopathy thyroid nontender few wheezes in the upper right lung zones heart appears regular  Assessment/Plan   Problem List Items Addressed This Visit           ICD-10-CM    CHF (congestive heart failure), NYHA class I, acute on chronic, combined I50.43    Atrial fibrillation, unspecified type (Multi) I48.91     Other Visit Diagnoses         Codes      Sinobronchitis    -  Primary J32.9, J40    Relevant Medications    doxycycline (Vibramycin) 100 mg capsule    predniSONE (Deltasone) 10 mg tablet      CKD stage 3b, GFR 30-44 ml/min (Multi)     N18.32             Patient was identified as a fall risk. Risk prevention instructions provided.

## 2025-07-07 ENCOUNTER — APPOINTMENT (OUTPATIENT)
Age: 80
End: 2025-07-07
Payer: MEDICARE

## 2025-07-07 VITALS
DIASTOLIC BLOOD PRESSURE: 60 MMHG | SYSTOLIC BLOOD PRESSURE: 140 MMHG | OXYGEN SATURATION: 96 % | BODY MASS INDEX: 31.24 KG/M2 | HEART RATE: 83 BPM | WEIGHT: 182 LBS

## 2025-07-07 DIAGNOSIS — Z95.2 HISTORY OF TRANSCATHETER AORTIC VALVE REPLACEMENT (TAVR): ICD-10-CM

## 2025-07-07 DIAGNOSIS — I10 PRIMARY HYPERTENSION: ICD-10-CM

## 2025-07-07 DIAGNOSIS — E06.3 HYPOTHYROIDISM DUE TO HASHIMOTO THYROIDITIS: ICD-10-CM

## 2025-07-07 DIAGNOSIS — G47.33 OSA ON CPAP: ICD-10-CM

## 2025-07-07 DIAGNOSIS — E78.2 MIXED HYPERLIPIDEMIA: ICD-10-CM

## 2025-07-07 DIAGNOSIS — I10 HTN (HYPERTENSION), BENIGN: ICD-10-CM

## 2025-07-07 DIAGNOSIS — Z00.00 ROUTINE GENERAL MEDICAL EXAMINATION AT HEALTH CARE FACILITY: Primary | ICD-10-CM

## 2025-07-07 PROCEDURE — 1170F FXNL STATUS ASSESSED: CPT | Performed by: FAMILY MEDICINE

## 2025-07-07 PROCEDURE — 3078F DIAST BP <80 MM HG: CPT | Performed by: FAMILY MEDICINE

## 2025-07-07 PROCEDURE — G0439 PPPS, SUBSEQ VISIT: HCPCS | Performed by: FAMILY MEDICINE

## 2025-07-07 PROCEDURE — 1160F RVW MEDS BY RX/DR IN RCRD: CPT | Performed by: FAMILY MEDICINE

## 2025-07-07 PROCEDURE — 99213 OFFICE O/P EST LOW 20 MIN: CPT | Performed by: FAMILY MEDICINE

## 2025-07-07 PROCEDURE — 1036F TOBACCO NON-USER: CPT | Performed by: FAMILY MEDICINE

## 2025-07-07 PROCEDURE — 1124F ACP DISCUSS-NO DSCNMKR DOCD: CPT | Performed by: FAMILY MEDICINE

## 2025-07-07 PROCEDURE — 3077F SYST BP >= 140 MM HG: CPT | Performed by: FAMILY MEDICINE

## 2025-07-07 PROCEDURE — 1159F MED LIST DOCD IN RCRD: CPT | Performed by: FAMILY MEDICINE

## 2025-07-07 RX ORDER — METOPROLOL SUCCINATE 100 MG/1
1 TABLET, EXTENDED RELEASE ORAL
COMMUNITY
Start: 2025-06-10

## 2025-07-07 ASSESSMENT — ENCOUNTER SYMPTOMS
LOSS OF SENSATION IN FEET: 0
OCCASIONAL FEELINGS OF UNSTEADINESS: 0
DEPRESSION: 0

## 2025-07-07 ASSESSMENT — ACTIVITIES OF DAILY LIVING (ADL)
MANAGING_FINANCES: INDEPENDENT
DRESSING: INDEPENDENT
BATHING: INDEPENDENT
GROCERY_SHOPPING: INDEPENDENT
DOING_HOUSEWORK: INDEPENDENT
TAKING_MEDICATION: INDEPENDENT

## 2025-07-07 ASSESSMENT — PATIENT HEALTH QUESTIONNAIRE - PHQ9
SUM OF ALL RESPONSES TO PHQ9 QUESTIONS 1 AND 2: 0
2. FEELING DOWN, DEPRESSED OR HOPELESS: NOT AT ALL
1. LITTLE INTEREST OR PLEASURE IN DOING THINGS: NOT AT ALL

## 2025-07-07 NOTE — ASSESSMENT & PLAN NOTE
Orders:    CBC; Future    Comprehensive Metabolic Panel; Future    Lipid Panel; Future    Follow Up In Primary Care; Future

## 2025-07-07 NOTE — PROGRESS NOTES
Subjective   Reason for Visit: Brit Lopez is an 79 y.o. female here for a Medicare Wellness visit.     Past Medical, Surgical, and Family History reviewed and updated in chart.    Reviewed all medications by prescribing practitioner or clinical pharmacist (such as prescriptions, OTCs, herbal therapies and supplements) and documented in the medical record.    HPI  Pain in both foot, right prev fx, left thr, can swell.Pain in front of heel. Consider podiatry    Since the last office visit there have been no interval operations, hospitalizations, important illnesses or injuries.      Episodic wheeze, can keep up at hs.  Had PFTs after pneumonia and they were reported as normal    Finishing plavix, from TAVR, may resume clistazol.  Hypothyroid- is euthyroid on replacement. Thyroid ros is unremarkable.  Hyperlipidemia- on statin and prudent diet    Patient Care Team:  Álvaro Sterling MD as PCP - General (Family Medicine)  Álvaro Sterling MD [facility]     Review of Systems  General-no fatigue weight to within 10 pounds  ENT no problems with vision swallowing  Cardiac trivial chest pains, occasional palpitations, no change in exercise tolerance or capacity  Pulmonary persisting nonproductive cough, episodic shortness of breath specially at night, not improved with inhalers.  Keep her up.  Declines pulmonary referral  GI no heartburn or abdominal pain  Musculoskeletal multiple foot joint pains  Objective   Vitals:  /60   Pulse 83   Wt 82.6 kg (182 lb)   SpO2 96%   BMI 31.24 kg/m²       Physical Exam  No bruit.  Thyroid nontender.  Heart regular.  Lungs clear to auscultation.  Abdomen soft.  Normal pedal pulse  Assessment & Plan  Routine general medical examination at health care facility    Orders:    1 Year Follow Up In Primary Care - Wellness Exam; Future    Primary hypertension    Orders:    Follow Up In Primary Care    HTN (hypertension), benign    Orders:    Follow Up  In Primary Care    CBC; Future    Comprehensive Metabolic Panel; Future    Lipid Panel; Future    Follow Up In Primary Care; Future    History of transcatheter aortic valve replacement (TAVR)         Mixed hyperlipidemia    Orders:    CBC; Future    Comprehensive Metabolic Panel; Future    Lipid Panel; Future    Follow Up In Primary Care; Future    Hypothyroidism due to Hashimoto thyroiditis    Orders:    Thyroid Stimulating Hormone; Future    Follow Up In Primary Care; Future    KASSI on CPAP    Orders:    Follow Up In Primary Care

## 2025-07-08 ENCOUNTER — TELEPHONE (OUTPATIENT)
Age: 80
End: 2025-07-08
Payer: MEDICARE

## 2025-07-08 LAB
ALBUMIN SERPL-MCNC: 4.5 G/DL (ref 3.6–5.1)
ALP SERPL-CCNC: 72 U/L (ref 37–153)
ALT SERPL-CCNC: 16 U/L (ref 6–29)
ANION GAP SERPL CALCULATED.4IONS-SCNC: 11 MMOL/L (CALC) (ref 7–17)
AST SERPL-CCNC: 22 U/L (ref 10–35)
BILIRUB SERPL-MCNC: 1.2 MG/DL (ref 0.2–1.2)
BUN SERPL-MCNC: 33 MG/DL (ref 7–25)
CALCIUM SERPL-MCNC: 10.3 MG/DL (ref 8.6–10.4)
CHLORIDE SERPL-SCNC: 104 MMOL/L (ref 98–110)
CHOLEST SERPL-MCNC: 167 MG/DL
CHOLEST/HDLC SERPL: 4.5 (CALC)
CO2 SERPL-SCNC: 24 MMOL/L (ref 20–32)
CREAT SERPL-MCNC: 1.61 MG/DL (ref 0.6–0.95)
EGFRCR SERPLBLD CKD-EPI 2021: 32 ML/MIN/1.73M2
ERYTHROCYTE [DISTWIDTH] IN BLOOD BY AUTOMATED COUNT: 13.2 % (ref 11–15)
GLUCOSE SERPL-MCNC: 140 MG/DL (ref 65–139)
HCT VFR BLD AUTO: 43 % (ref 35–45)
HDLC SERPL-MCNC: 37 MG/DL
HGB BLD-MCNC: 14 G/DL (ref 11.7–15.5)
LDLC SERPL CALC-MCNC: 93 MG/DL (CALC)
MCH RBC QN AUTO: 30.5 PG (ref 27–33)
MCHC RBC AUTO-ENTMCNC: 32.6 G/DL (ref 32–36)
MCV RBC AUTO: 93.7 FL (ref 80–100)
NONHDLC SERPL-MCNC: 130 MG/DL (CALC)
PLATELET # BLD AUTO: 271 THOUSAND/UL (ref 140–400)
PMV BLD REES-ECKER: 9 FL (ref 7.5–12.5)
POTASSIUM SERPL-SCNC: 4.3 MMOL/L (ref 3.5–5.3)
PROT SERPL-MCNC: 6.9 G/DL (ref 6.1–8.1)
RBC # BLD AUTO: 4.59 MILLION/UL (ref 3.8–5.1)
SODIUM SERPL-SCNC: 139 MMOL/L (ref 135–146)
TRIGL SERPL-MCNC: 259 MG/DL
TSH SERPL-ACNC: 0.12 MIU/L (ref 0.4–4.5)
WBC # BLD AUTO: 5.8 THOUSAND/UL (ref 3.8–10.8)

## 2025-07-08 NOTE — TELEPHONE ENCOUNTER
----- Message from Álvaro Sterling sent at 7/8/2025  1:17 PM EDT -----  Her thyroid suggest that we have her on a bigger dose than she needs.  She can remain on the 100 mcg tablet but skip 2 days/month such as the first and the 15th.  ----- Message -----  From: [employer] Results In  Sent: 7/8/2025   4:23 AM EDT  To: Álvaro Sterling MD    Pt notified and confirmed understanding.

## 2025-09-15 ENCOUNTER — APPOINTMENT (OUTPATIENT)
Dept: SURGERY | Facility: CLINIC | Age: 80
End: 2025-09-15
Payer: MEDICARE

## 2025-10-07 ENCOUNTER — APPOINTMENT (OUTPATIENT)
Dept: NEPHROLOGY | Facility: CLINIC | Age: 80
End: 2025-10-07
Payer: MEDICARE

## 2026-01-13 ENCOUNTER — APPOINTMENT (OUTPATIENT)
Age: 81
End: 2026-01-13
Payer: MEDICARE